# Patient Record
Sex: FEMALE | Race: WHITE | NOT HISPANIC OR LATINO | Employment: OTHER | URBAN - METROPOLITAN AREA
[De-identification: names, ages, dates, MRNs, and addresses within clinical notes are randomized per-mention and may not be internally consistent; named-entity substitution may affect disease eponyms.]

---

## 2022-08-06 ENCOUNTER — TRANSFERRED RECORDS (OUTPATIENT)
Dept: HEALTH INFORMATION MANAGEMENT | Facility: CLINIC | Age: 87
End: 2022-08-06

## 2022-08-06 LAB
ALT SERPL-CCNC: 18 U/L (ref 12–78)
AST SERPL-CCNC: 11 U/L (ref 15–37)
CREATININE (EXTERNAL): 1 MG/DL (ref 0.6–1)
GFR ESTIMATED (EXTERNAL): 53 ML/MIN
GLUCOSE (EXTERNAL): 93 MG/DL (ref 74–106)
POTASSIUM (EXTERNAL): 4.1 MMOL/L (ref 3.6–5.2)

## 2022-08-25 ENCOUNTER — TRANSFERRED RECORDS (OUTPATIENT)
Dept: HEALTH INFORMATION MANAGEMENT | Facility: CLINIC | Age: 87
End: 2022-08-25

## 2022-08-25 LAB
ALT SERPL-CCNC: 19 U/L (ref 12–78)
AST SERPL-CCNC: 13 U/L (ref 15–37)
CREATININE (EXTERNAL): 1 MG/DL (ref 0.6–1)
GFR ESTIMATED (EXTERNAL): 53 ML/MIN
GLUCOSE (EXTERNAL): 91 MG/DL (ref 74–106)
POTASSIUM (EXTERNAL): 4.1 MMOL/L (ref 3.6–5.2)

## 2022-08-26 ENCOUNTER — TRANSFERRED RECORDS (OUTPATIENT)
Dept: HEALTH INFORMATION MANAGEMENT | Facility: CLINIC | Age: 87
End: 2022-08-26

## 2022-09-19 ENCOUNTER — TRANSFERRED RECORDS (OUTPATIENT)
Dept: HEALTH INFORMATION MANAGEMENT | Facility: CLINIC | Age: 87
End: 2022-09-19

## 2022-09-19 LAB
ALT SERPL-CCNC: 14 U/L (ref 12–78)
AST SERPL-CCNC: 15 U/L (ref 15–37)
CREATININE (EXTERNAL): 1 MG/DL (ref 0.6–1)
GFR ESTIMATED (EXTERNAL): 53 ML/MIN
GLUCOSE (EXTERNAL): 111 MG/DL (ref 74–106)
POTASSIUM (EXTERNAL): 3.8 MMOL/L (ref 3.6–5.2)

## 2022-10-07 ENCOUNTER — TRANSFERRED RECORDS (OUTPATIENT)
Dept: HEALTH INFORMATION MANAGEMENT | Facility: CLINIC | Age: 87
End: 2022-10-07

## 2022-10-19 ENCOUNTER — TRANSFERRED RECORDS (OUTPATIENT)
Dept: HEALTH INFORMATION MANAGEMENT | Facility: CLINIC | Age: 87
End: 2022-10-19

## 2022-11-06 ENCOUNTER — TRANSFERRED RECORDS (OUTPATIENT)
Dept: HEALTH INFORMATION MANAGEMENT | Facility: CLINIC | Age: 87
End: 2022-11-06

## 2022-11-06 LAB
CREATININE (EXTERNAL): 1 MG/DL (ref 0.6–1)
GFR ESTIMATED (EXTERNAL): 53 ML/MIN
GLUCOSE (EXTERNAL): 102 MG/DL (ref 74–106)
POTASSIUM (EXTERNAL): 4.1 MMOL/L (ref 3.6–5.2)

## 2023-01-19 ENCOUNTER — OFFICE VISIT (OUTPATIENT)
Dept: FAMILY MEDICINE | Facility: CLINIC | Age: 88
End: 2023-01-19
Payer: MEDICARE

## 2023-01-19 VITALS
RESPIRATION RATE: 16 BRPM | DIASTOLIC BLOOD PRESSURE: 77 MMHG | SYSTOLIC BLOOD PRESSURE: 146 MMHG | WEIGHT: 106 LBS | HEART RATE: 91 BPM | OXYGEN SATURATION: 97 % | TEMPERATURE: 97.8 F

## 2023-01-19 DIAGNOSIS — F32.1 CURRENT MODERATE EPISODE OF MAJOR DEPRESSIVE DISORDER WITHOUT PRIOR EPISODE (H): Primary | ICD-10-CM

## 2023-01-19 DIAGNOSIS — R41.3 MEMORY LOSS: ICD-10-CM

## 2023-01-19 DIAGNOSIS — E21.3 HYPERPARATHYROIDISM (H): ICD-10-CM

## 2023-01-19 DIAGNOSIS — H35.3233 EXUDATIVE AGE-RELATED MACULAR DEGENERATION OF BOTH EYES WITH INACTIVE SCAR (H): ICD-10-CM

## 2023-01-19 PROCEDURE — 99204 OFFICE O/P NEW MOD 45 MIN: CPT | Performed by: FAMILY MEDICINE

## 2023-01-19 NOTE — PROGRESS NOTES
Assessment & Plan     Current moderate episode of major depressive disorder without prior episode (H)  Here with daughter today.  Living with daughter for winter and potentially longer.      Have been concerns about mood and memory.      Keysha voices being proud for how well she is doing for age 89, has been very independent, [place]/week.     Does endorse has had somewhat more difficulty with short term memory but does not see as problem.     With further discussion although sees herself as strong woman does admit feels quite lonely, sad at times and tends to isolate/stay in room when this happens.      Daughter suggests that perhaps depression medication could be helpful.     We had careful discussion about the risks and benefits of this and considering very low risks, does not change personality and not addictive I feel strongly would be of benefit to try.  Keysha agrees with this logic and willing to try.  Will start low dose and go up slowly to 1/2 max dose.  Watch for upset stomach/jitteriness as main side effects.      Follow up recommended about six weeks.     - sertraline (ZOLOFT) 25 MG tablet  Dispense: 30 tablet; Refill: 0  - sertraline (ZOLOFT) 50 MG tablet  Dispense: 90 tablet; Refill: 3    Memory loss  Will continue to monitor and discuss. Per chart review has had appropriate evaluation for underlying causes with metabolic testing, thyroid, b12/folate, mri.     Currently has safety needs met living with daughter.     Reviewed additional health history of macular degeneration and possibly hyperparathyroidism with high pth and only slightly high calcium levsl.     Recent gfr 54 so may have ckd                 Return in about 6 weeks (around 3/2/2023) for mood/depression.    Joel Daniel Wegener, MD  [facility]    Subjective   Keysha is a 89 year old, presenting for the following health issues:  Establish Care      History of  Present Illness       Reason for visit:  Check up    She eats 2-3 servings of fruits and vegetables daily.She exercises with enough effort to increase her heart rate 30 to 60 minutes per day.    She is taking medications regularly.             Review of Systems         Objective    There were no vitals taken for this visit.  There is no height or weight on file to calculate BMI.  Physical Exam

## 2023-01-19 NOTE — PATIENT INSTRUCTIONS
Disucssed health history, memory, mood.  Living with daughter over winter.     Discussed trial of fluoxetine (I will double check to make sure this is preferred geriatric medication).     Will start low dose for 2-4 weeks then increase if no side effects.     Follow up about six weeks.

## 2023-01-20 ENCOUNTER — TELEPHONE (OUTPATIENT)
Dept: FAMILY MEDICINE | Facility: CLINIC | Age: 88
End: 2023-01-20
Payer: MEDICARE

## 2023-01-20 RX ORDER — SERTRALINE HYDROCHLORIDE 25 MG/1
TABLET, FILM COATED ORAL
Qty: 30 TABLET | Refills: 0 | Status: SHIPPED | OUTPATIENT
Start: 2023-01-20 | End: 2023-03-17

## 2023-01-20 NOTE — TELEPHONE ENCOUNTER
Triage,   Patient's daughter called.   Saw [facility] yesterday.  States they are waiting for a Rx from [facility].   Currently at the pharmacy.   Please advise.   Thanks!  Cassidy SHEPHERD

## 2023-01-20 NOTE — TELEPHONE ENCOUNTER
Thank you.  I was waiting to discuss with Pharm D team which I have now.     Actually sertraline would be preferred selective serotonin reuptake inhibitor in elderly.     Please let them know I sent prescription now.     Joel Wegener,MD w

## 2023-01-22 PROBLEM — H35.3233: Status: ACTIVE | Noted: 2023-01-22

## 2023-01-22 PROBLEM — E21.3 HYPERPARATHYROIDISM (H): Status: ACTIVE | Noted: 2023-01-22

## 2023-01-31 DIAGNOSIS — F32.1 CURRENT MODERATE EPISODE OF MAJOR DEPRESSIVE DISORDER WITHOUT PRIOR EPISODE (H): ICD-10-CM

## 2023-02-01 RX ORDER — SERTRALINE HYDROCHLORIDE 25 MG/1
TABLET, FILM COATED ORAL
Qty: 1 TABLET | Refills: 0 | OUTPATIENT
Start: 2023-02-01

## 2023-02-23 ENCOUNTER — NURSE TRIAGE (OUTPATIENT)
Dept: NURSING | Facility: CLINIC | Age: 88
End: 2023-02-23
Payer: MEDICARE

## 2023-02-23 DIAGNOSIS — U07.1 INFECTION DUE TO 2019 NOVEL CORONAVIRUS: Primary | ICD-10-CM

## 2023-02-23 NOTE — TELEPHONE ENCOUNTER
COVID Positive/Requesting COVID treatment    Patient is positive for COVID and requesting treatment options.    Date of positive COVID test (PCR or at home)? 2/22/2023  Current COVID symptoms: fever or chills, cough, fatigue, muscle or body aches and congestion or runny nose  Date COVID symptoms began: 2/20/2023    Message should be routed to clinic RN pool. Best phone number to use for call back: [phone]    ALEXANDRIA MONTOYA RN        Reason for Disposition    [1] HIGH RISK for severe COVID complications (e.g., weak immune system, age > 64 years, obesity with BMI of 30 or higher, pregnant, chronic lung disease or other chronic medical condition) AND [2] COVID symptoms (e.g., cough, fever)  (Exceptions: Already seen by PCP and no new or worsening symptoms.)    Additional Information    Negative: SEVERE difficulty breathing (e.g., struggling for each breath, speaks in single words)    Negative: Difficult to awaken or acting confused (e.g., disoriented, slurred speech)    Negative: Bluish (or gray) lips or face now    Negative: Shock suspected (e.g., cold/pale/clammy skin, too weak to stand, low BP, rapid pulse)    Negative: Sounds like a life-threatening emergency to the triager    Negative: [1] Diagnosed or suspected COVID-19 AND [2] symptoms lasting 3 or more weeks    Negative: [1] COVID-19 exposure AND [2] no symptoms    Negative: COVID-19 vaccine reaction suspected (e.g., fever, headache, muscle aches) occurring 1 to 3 days after getting vaccine    Negative: COVID-19 vaccine, questions about    Negative: [1] Lives with someone known to have influenza (flu test positive) AND [2] flu-like symptoms (e.g., cough, runny nose, sore throat, SOB; with or without fever)    Negative: [1] Adult with possible COVID-19 symptoms AND [2] triager concerned about severity of symptoms or other causes    Negative: COVID-19 and breastfeeding, questions about    Negative: SEVERE or constant chest pain or pressure   (Exception: Mild central chest pain, present only when coughing.)    Negative: MODERATE difficulty breathing (e.g., speaks in phrases, SOB even at rest, pulse 100-120)    Negative: Headache and stiff neck (can't touch chin to chest)    Negative: Oxygen level (e.g., pulse oximetry) 90 percent or lower    Negative: Chest pain or pressure  (Exception: MILD central chest pain, present only when coughing)    Negative: Patient sounds very sick or weak to the triager    Negative: MILD difficulty breathing (e.g., minimal/no SOB at rest, SOB with walking, pulse <100)    Negative: Fever > 103 F (39.4 C)    Negative: [1] Fever > 101 F (38.3 C) AND [2] over 60 years of age    Negative: [1] Fever > 100.0 F (37.8 C) AND [2] bedridden (e.g., nursing home patient, CVA, chronic illness, recovering from surgery)    Protocols used: CORONAVIRUS (COVID-19) DIAGNOSED OR [id number]

## 2023-02-23 NOTE — TELEPHONE ENCOUNTER
RN/TC please let her AND her daughter know the following (consent to communicate on file.)     Provider Response to 2nd Level Triage Request    I have reviewed the RN documentation. My recommendation is:  meets critera for protocol based paxlovid treatment.  No significant drug interactions so may continue sertraline.  does havereduced kidney function so dose adjusted.    I sent prescription to [facility].     Please note I believe her daughter who cares for her has also been prescribed paxlovid.  Please make sure they know that the dosing of their respective medications will be different.     Joel Wegener,MD

## 2023-02-23 NOTE — TELEPHONE ENCOUNTER
Left non detailed voicemail for patient and daughter  Stated dose is different and that pharmacist should also review this    Jazz PADILLA RN

## 2023-03-17 ENCOUNTER — OFFICE VISIT (OUTPATIENT)
Dept: FAMILY MEDICINE | Facility: CLINIC | Age: 88
End: 2023-03-17
Payer: MEDICARE

## 2023-03-17 VITALS
RESPIRATION RATE: 14 BRPM | HEIGHT: 58 IN | BODY MASS INDEX: 21.14 KG/M2 | HEART RATE: 90 BPM | WEIGHT: 100.7 LBS | TEMPERATURE: 97.8 F | OXYGEN SATURATION: 99 % | SYSTOLIC BLOOD PRESSURE: 120 MMHG | DIASTOLIC BLOOD PRESSURE: 69 MMHG

## 2023-03-17 DIAGNOSIS — M48.061 SPINAL STENOSIS OF LUMBAR REGION WITHOUT NEUROGENIC CLAUDICATION: ICD-10-CM

## 2023-03-17 DIAGNOSIS — M47.816 SPONDYLOSIS OF LUMBAR REGION WITHOUT MYELOPATHY OR RADICULOPATHY: ICD-10-CM

## 2023-03-17 DIAGNOSIS — R41.3 MEMORY LOSS: ICD-10-CM

## 2023-03-17 DIAGNOSIS — F32.1 CURRENT MODERATE EPISODE OF MAJOR DEPRESSIVE DISORDER WITHOUT PRIOR EPISODE (H): Primary | ICD-10-CM

## 2023-03-17 PROCEDURE — 99213 OFFICE O/P EST LOW 20 MIN: CPT | Performed by: FAMILY MEDICINE

## 2023-03-17 ASSESSMENT — PAIN SCALES - GENERAL: PAINLEVEL: MODERATE PAIN (4)

## 2023-03-17 NOTE — PROGRESS NOTES
Assessment & Plan     Current moderate episode of major depressive disorder without prior episode (H)  Here with daughter whom she is living with.  Keysha notes good mood, no side effects from sertraline.  Reports feeling happy, good attitude/getting along with daughter.  Still playing bridge 1-2 times/week and participating in a book club with daughter. Daughter Meka feel mood much improved since last visit.  Not isolating in room nearly so much, has only cried once , seems less worried.     Discussed full dose 100mg/day of sertraline however she is not particularly heavy so elected to stay at current 50mg/day dose.     - Adult Mental Health  Referral; Future    Memory loss  Still some concern re: memory despite improvement above.  Keysha is hoping to return to her home living independently this summer however she does report agreement and understanding of memory concerns and we discussed referral for neurocognitive testing for independent living safety per request of her daughter and she agrees to participate.   - Adult Mental Health  Referral; Future    Spinal stenosis of lumbar region without neurogenic claudication  and    Spondylosis of lumbar region without myelopathy or radiculopathy  Both noted as chronic problems.  Keysha not feeling substantial change or need to intervene  She is proud of how active she has stayed despite h/o back arthritis/spinal stenosis.  Using apap only now with some relief.  Previously was using hydrocodone but did not seem to be beneficial only caused drowsiness.  Does not use nsaids due to low gfr.     Follow up January for next annual wellness/earlier as needed.         25 minutes spent on the date of the encounter doing chart review, history and exam, negotiating and explaining the plan with the patient, documentation and further activities as noted above.                    No follow-ups on file.    Joel Daniel Wegener, MD  [facility]  YANI Chopra is a 89 year old, presenting for the following health issues:  No chief complaint on file.      History of Present Illness       Back Pain:  She presents for follow up of back pain. Patient's back pain is a chronic problem.  Location of back pain:  Right lower back  Description of back pain: dull ache  Back pain spreads: right buttocks    Since patient first noticed back pain, pain is: always present, but gets better and worse  Does back pain interfere with her job:  Not applicable      She eats 2-3 servings of fruits and vegetables daily.She exercises with enough effort to increase her heart rate 30 to 60 minutes per day.               Review of Systems         Objective    There were no vitals taken for this visit.  There is no height or weight on file to calculate BMI.  Physical Exam

## 2023-06-13 DIAGNOSIS — F32.1 CURRENT MODERATE EPISODE OF MAJOR DEPRESSIVE DISORDER WITHOUT PRIOR EPISODE (H): ICD-10-CM

## 2023-06-13 NOTE — TELEPHONE ENCOUNTER
Prescription approved per [facility] Refill Protocol.      Aayz DEVINE RN   [facility]

## 2023-12-27 DIAGNOSIS — F32.1 CURRENT MODERATE EPISODE OF MAJOR DEPRESSIVE DISORDER WITHOUT PRIOR EPISODE (H): ICD-10-CM

## 2024-01-08 ENCOUNTER — HOSPITAL ENCOUNTER (OUTPATIENT)
Facility: CLINIC | Age: 89
Setting detail: OBSERVATION
Discharge: HOME | End: 2024-01-09
Attending: EMERGENCY MEDICINE | Admitting: STUDENT IN AN ORGANIZED HEALTH CARE EDUCATION/TRAINING PROGRAM
Payer: MEDICARE

## 2024-01-08 ENCOUNTER — APPOINTMENT (OUTPATIENT)
Dept: MRI IMAGING | Facility: CLINIC | Age: 89
End: 2024-01-08
Attending: EMERGENCY MEDICINE
Payer: MEDICARE

## 2024-01-08 ENCOUNTER — TELEPHONE (OUTPATIENT)
Dept: FAMILY MEDICINE | Facility: CLINIC | Age: 89
End: 2024-01-08
Payer: MEDICARE

## 2024-01-08 DIAGNOSIS — M48.061 SPINAL STENOSIS OF LUMBAR REGION WITHOUT NEUROGENIC CLAUDICATION: ICD-10-CM

## 2024-01-08 DIAGNOSIS — R11.10 VOMITING AND DIARRHEA: ICD-10-CM

## 2024-01-08 DIAGNOSIS — R53.1 GENERALIZED WEAKNESS: ICD-10-CM

## 2024-01-08 DIAGNOSIS — R20.0 NUMBNESS AND TINGLING IN LEFT ARM: ICD-10-CM

## 2024-01-08 DIAGNOSIS — R20.2 NUMBNESS AND TINGLING IN LEFT ARM: ICD-10-CM

## 2024-01-08 DIAGNOSIS — R19.7 VOMITING AND DIARRHEA: ICD-10-CM

## 2024-01-08 DIAGNOSIS — R79.89 ELEVATED TROPONIN: ICD-10-CM

## 2024-01-08 DIAGNOSIS — R41.3 MEMORY LOSS: Primary | ICD-10-CM

## 2024-01-08 LAB
ALBUMIN SERPL BCG-MCNC: 4.2 G/DL (ref 3.5–5.2)
ALBUMIN UR-MCNC: NEGATIVE MG/DL
ALP SERPL-CCNC: 74 U/L (ref 40–150)
ALT SERPL W P-5'-P-CCNC: 10 U/L (ref 0–50)
ANION GAP SERPL CALCULATED.3IONS-SCNC: 8 MMOL/L (ref 7–15)
APPEARANCE UR: CLEAR
AST SERPL W P-5'-P-CCNC: 15 U/L (ref 0–45)
BASOPHILS # BLD AUTO: 0 10E3/UL (ref 0–0.2)
BASOPHILS NFR BLD AUTO: 0 %
BILIRUB SERPL-MCNC: 0.2 MG/DL
BILIRUB UR QL STRIP: NEGATIVE
BUN SERPL-MCNC: 21.7 MG/DL (ref 8–23)
CALCIUM SERPL-MCNC: 9.3 MG/DL (ref 8.2–9.6)
CHLORIDE SERPL-SCNC: 104 MMOL/L (ref 98–107)
COLOR UR AUTO: ABNORMAL
CREAT SERPL-MCNC: 0.94 MG/DL (ref 0.51–0.95)
DEPRECATED HCO3 PLAS-SCNC: 24 MMOL/L (ref 22–29)
EGFRCR SERPLBLD CKD-EPI 2021: 56 ML/MIN/1.73M2
EOSINOPHIL # BLD AUTO: 0 10E3/UL (ref 0–0.7)
EOSINOPHIL NFR BLD AUTO: 1 %
ERYTHROCYTE [DISTWIDTH] IN BLOOD BY AUTOMATED COUNT: 15.3 % (ref 10–15)
FLUAV RNA SPEC QL NAA+PROBE: NEGATIVE
FLUBV RNA RESP QL NAA+PROBE: NEGATIVE
GLUCOSE SERPL-MCNC: 125 MG/DL (ref 70–99)
GLUCOSE UR STRIP-MCNC: NEGATIVE MG/DL
HCT VFR BLD AUTO: 30.8 % (ref 35–47)
HGB BLD-MCNC: 9.6 G/DL (ref 11.7–15.7)
HGB UR QL STRIP: NEGATIVE
HOLD SPECIMEN: NORMAL
HOLD SPECIMEN: NORMAL
IMM GRANULOCYTES # BLD: 0 10E3/UL
IMM GRANULOCYTES NFR BLD: 0 %
IRON BINDING CAPACITY (ROCHE): 362 UG/DL (ref 240–430)
IRON SATN MFR SERPL: 9 % (ref 15–46)
IRON SERPL-MCNC: 31 UG/DL (ref 37–145)
KETONES UR STRIP-MCNC: NEGATIVE MG/DL
LACTATE SERPL-SCNC: 1.4 MMOL/L (ref 0.7–2)
LEUKOCYTE ESTERASE UR QL STRIP: ABNORMAL
LIPASE SERPL-CCNC: 39 U/L (ref 13–60)
LYMPHOCYTES # BLD AUTO: 0.5 10E3/UL (ref 0.8–5.3)
LYMPHOCYTES NFR BLD AUTO: 10 %
MCH RBC QN AUTO: 25.8 PG (ref 26.5–33)
MCHC RBC AUTO-ENTMCNC: 31.2 G/DL (ref 31.5–36.5)
MCV RBC AUTO: 83 FL (ref 78–100)
MONOCYTES # BLD AUTO: 0.6 10E3/UL (ref 0–1.3)
MONOCYTES NFR BLD AUTO: 11 %
MUCOUS THREADS #/AREA URNS LPF: PRESENT /LPF
NEUTROPHILS # BLD AUTO: 4.2 10E3/UL (ref 1.6–8.3)
NEUTROPHILS NFR BLD AUTO: 78 %
NITRATE UR QL: NEGATIVE
NRBC # BLD AUTO: 0 10E3/UL
NRBC BLD AUTO-RTO: 0 /100
PH UR STRIP: 6 [PH] (ref 5–7)
PLATELET # BLD AUTO: 210 10E3/UL (ref 150–450)
POTASSIUM SERPL-SCNC: 4.6 MMOL/L (ref 3.4–5.3)
PROT SERPL-MCNC: 6.6 G/DL (ref 6.4–8.3)
RBC # BLD AUTO: 3.72 10E6/UL (ref 3.8–5.2)
RBC URINE: <1 /HPF
RETICS # AUTO: 0.04 10E6/UL (ref 0.03–0.1)
RETICS/RBC NFR AUTO: 1.2 % (ref 0.5–2)
RSV RNA SPEC NAA+PROBE: NEGATIVE
SARS-COV-2 RNA RESP QL NAA+PROBE: NEGATIVE
SODIUM SERPL-SCNC: 136 MMOL/L (ref 135–145)
SP GR UR STRIP: 1.01 (ref 1–1.03)
SQUAMOUS EPITHELIAL: <1 /HPF
TROPONIN T SERPL HS-MCNC: 30 NG/L
TROPONIN T SERPL HS-MCNC: 33 NG/L
UROBILINOGEN UR STRIP-MCNC: NORMAL MG/DL
WBC # BLD AUTO: 5.4 10E3/UL (ref 4–11)
WBC URINE: 10 /HPF

## 2024-01-08 PROCEDURE — 70544 MR ANGIOGRAPHY HEAD W/O DYE: CPT | Mod: MA

## 2024-01-08 PROCEDURE — 87637 SARSCOV2&INF A&B&RSV AMP PRB: CPT | Performed by: EMERGENCY MEDICINE

## 2024-01-08 PROCEDURE — 258N000003 HC RX IP 258 OP 636: Performed by: EMERGENCY MEDICINE

## 2024-01-08 PROCEDURE — 36415 COLL VENOUS BLD VENIPUNCTURE: CPT | Performed by: STUDENT IN AN ORGANIZED HEALTH CARE EDUCATION/TRAINING PROGRAM

## 2024-01-08 PROCEDURE — 85025 COMPLETE CBC W/AUTO DIFF WBC: CPT | Performed by: EMERGENCY MEDICINE

## 2024-01-08 PROCEDURE — 258N000003 HC RX IP 258 OP 636: Performed by: STUDENT IN AN ORGANIZED HEALTH CARE EDUCATION/TRAINING PROGRAM

## 2024-01-08 PROCEDURE — G0378 HOSPITAL OBSERVATION PER HR: HCPCS

## 2024-01-08 PROCEDURE — 96361 HYDRATE IV INFUSION ADD-ON: CPT

## 2024-01-08 PROCEDURE — 82728 ASSAY OF FERRITIN: CPT | Performed by: STUDENT IN AN ORGANIZED HEALTH CARE EDUCATION/TRAINING PROGRAM

## 2024-01-08 PROCEDURE — 85045 AUTOMATED RETICULOCYTE COUNT: CPT | Performed by: STUDENT IN AN ORGANIZED HEALTH CARE EDUCATION/TRAINING PROGRAM

## 2024-01-08 PROCEDURE — A9585 GADOBUTROL INJECTION: HCPCS | Performed by: EMERGENCY MEDICINE

## 2024-01-08 PROCEDURE — 83690 ASSAY OF LIPASE: CPT | Performed by: EMERGENCY MEDICINE

## 2024-01-08 PROCEDURE — 36415 COLL VENOUS BLD VENIPUNCTURE: CPT | Performed by: EMERGENCY MEDICINE

## 2024-01-08 PROCEDURE — 70549 MR ANGIOGRAPH NECK W/O&W/DYE: CPT | Mod: MA

## 2024-01-08 PROCEDURE — 70553 MRI BRAIN STEM W/O & W/DYE: CPT | Mod: MA

## 2024-01-08 PROCEDURE — 87086 URINE CULTURE/COLONY COUNT: CPT | Performed by: EMERGENCY MEDICINE

## 2024-01-08 PROCEDURE — 99223 1ST HOSP IP/OBS HIGH 75: CPT | Mod: AI | Performed by: STUDENT IN AN ORGANIZED HEALTH CARE EDUCATION/TRAINING PROGRAM

## 2024-01-08 PROCEDURE — 84484 ASSAY OF TROPONIN QUANT: CPT | Performed by: STUDENT IN AN ORGANIZED HEALTH CARE EDUCATION/TRAINING PROGRAM

## 2024-01-08 PROCEDURE — 81001 URINALYSIS AUTO W/SCOPE: CPT | Performed by: EMERGENCY MEDICINE

## 2024-01-08 PROCEDURE — 99285 EMERGENCY DEPT VISIT HI MDM: CPT | Mod: 25

## 2024-01-08 PROCEDURE — 84484 ASSAY OF TROPONIN QUANT: CPT | Performed by: EMERGENCY MEDICINE

## 2024-01-08 PROCEDURE — 82040 ASSAY OF SERUM ALBUMIN: CPT | Performed by: EMERGENCY MEDICINE

## 2024-01-08 PROCEDURE — 255N000002 HC RX 255 OP 636: Performed by: EMERGENCY MEDICINE

## 2024-01-08 PROCEDURE — 83605 ASSAY OF LACTIC ACID: CPT | Performed by: EMERGENCY MEDICINE

## 2024-01-08 PROCEDURE — 83550 IRON BINDING TEST: CPT | Performed by: STUDENT IN AN ORGANIZED HEALTH CARE EDUCATION/TRAINING PROGRAM

## 2024-01-08 PROCEDURE — 250N000013 HC RX MED GY IP 250 OP 250 PS 637: Performed by: STUDENT IN AN ORGANIZED HEALTH CARE EDUCATION/TRAINING PROGRAM

## 2024-01-08 PROCEDURE — 93005 ELECTROCARDIOGRAM TRACING: CPT

## 2024-01-08 PROCEDURE — 250N000013 HC RX MED GY IP 250 OP 250 PS 637: Performed by: EMERGENCY MEDICINE

## 2024-01-08 RX ORDER — ONDANSETRON 2 MG/ML
4 INJECTION INTRAMUSCULAR; INTRAVENOUS EVERY 6 HOURS PRN
Status: DISCONTINUED | OUTPATIENT
Start: 2024-01-08 | End: 2024-01-09 | Stop reason: HOSPADM

## 2024-01-08 RX ORDER — GADOBUTROL 604.72 MG/ML
10 INJECTION INTRAVENOUS ONCE
Status: COMPLETED | OUTPATIENT
Start: 2024-01-08 | End: 2024-01-08

## 2024-01-08 RX ORDER — MAGNESIUM HYDROXIDE/ALUMINUM HYDROXICE/SIMETHICONE 120; 1200; 1200 MG/30ML; MG/30ML; MG/30ML
15 SUSPENSION ORAL 3 TIMES DAILY PRN
Status: DISCONTINUED | OUTPATIENT
Start: 2024-01-08 | End: 2024-01-09 | Stop reason: HOSPADM

## 2024-01-08 RX ORDER — SODIUM CHLORIDE 9 MG/ML
INJECTION, SOLUTION INTRAVENOUS CONTINUOUS
Status: DISCONTINUED | OUTPATIENT
Start: 2024-01-08 | End: 2024-01-09

## 2024-01-08 RX ORDER — ACETAMINOPHEN 650 MG/1
650 SUPPOSITORY RECTAL EVERY 4 HOURS PRN
Status: DISCONTINUED | OUTPATIENT
Start: 2024-01-08 | End: 2024-01-09 | Stop reason: HOSPADM

## 2024-01-08 RX ORDER — ASPIRIN 81 MG/1
324 TABLET, CHEWABLE ORAL ONCE
Status: COMPLETED | OUTPATIENT
Start: 2024-01-08 | End: 2024-01-08

## 2024-01-08 RX ORDER — AMOXICILLIN 250 MG
1 CAPSULE ORAL 2 TIMES DAILY PRN
Status: DISCONTINUED | OUTPATIENT
Start: 2024-01-08 | End: 2024-01-09 | Stop reason: HOSPADM

## 2024-01-08 RX ORDER — AMOXICILLIN 250 MG
2 CAPSULE ORAL 2 TIMES DAILY PRN
Status: DISCONTINUED | OUTPATIENT
Start: 2024-01-08 | End: 2024-01-09 | Stop reason: HOSPADM

## 2024-01-08 RX ORDER — ONDANSETRON 4 MG/1
4 TABLET, ORALLY DISINTEGRATING ORAL EVERY 6 HOURS PRN
Status: DISCONTINUED | OUTPATIENT
Start: 2024-01-08 | End: 2024-01-09 | Stop reason: HOSPADM

## 2024-01-08 RX ORDER — ACETAMINOPHEN 325 MG/1
650 TABLET ORAL EVERY 4 HOURS PRN
Status: DISCONTINUED | OUTPATIENT
Start: 2024-01-08 | End: 2024-01-09 | Stop reason: HOSPADM

## 2024-01-08 RX ORDER — PANTOPRAZOLE SODIUM 40 MG/1
40 TABLET, DELAYED RELEASE ORAL
Status: DISCONTINUED | OUTPATIENT
Start: 2024-01-09 | End: 2024-01-09 | Stop reason: HOSPADM

## 2024-01-08 RX ORDER — ANTIOX #8/OM3/DHA/EPA/LUT/ZEAX 250-2.5 MG
2 CAPSULE ORAL DAILY
COMMUNITY
End: 2024-03-04

## 2024-01-08 RX ADMIN — SODIUM CHLORIDE: 9 INJECTION, SOLUTION INTRAVENOUS at 22:42

## 2024-01-08 RX ADMIN — SODIUM CHLORIDE 500 ML: 9 INJECTION, SOLUTION INTRAVENOUS at 16:29

## 2024-01-08 RX ADMIN — GADOBUTROL 10 ML: 604.72 INJECTION INTRAVENOUS at 17:06

## 2024-01-08 RX ADMIN — ASPIRIN 81 MG CHEWABLE TABLET 324 MG: 81 TABLET CHEWABLE at 21:01

## 2024-01-08 RX ADMIN — ACETAMINOPHEN 650 MG: 325 TABLET, FILM COATED ORAL at 22:50

## 2024-01-08 ASSESSMENT — ACTIVITIES OF DAILY LIVING (ADL)
ADLS_ACUITY_SCORE: 35
ADLS_ACUITY_SCORE: 35
ADLS_ACUITY_SCORE: 33
ADLS_ACUITY_SCORE: 35
ADLS_ACUITY_SCORE: 35

## 2024-01-08 NOTE — TELEPHONE ENCOUNTER
"JW,        Patient's daughter Meka called.    State patient has not been herself since yesterday.  States she has had stomach upset - vomited last night around 9:00 pm.  Loose stools.    Patient did eat a good breakfast this am - no vomiting    Patient informed daughter he left hand and arm feel numb.  Patient can move both her arm and hand.  Denies any facial drooping.    States usually up walking but is sitting in chair and when she gets up she is very wobbly.  Does not use a walker/cane    Denies any fever, recent illness     \"I don't know what it is but she is just not herself\"    Send her to ER?     Hellen Kennedy RN    "

## 2024-01-08 NOTE — ED PROVIDER NOTES
History     Chief Complaint:  Generalized Weakness and Vomiting     The history is provided by the patient and a relative.      Keysha Anders is a 89 year old female with history of stroke, recent dementia diagnosis, osteoporosis, iron deficiency anemia, gastritis, and encephalitis (in 1960) who presents to the ED with her daughter, whom she currently lives with and is her caregiver, for evaluation of generalized weakness, vomiting, and diarrhea. Keysha reports she developed vomiting last night and has had 2-3 episodes of diarrhea daily for the last 2 days. She had mild upper abdominal discomfort with this initially, which has since resolved completely. She also endorses numbness/tingling to her right hand extending to her wrist over the last few days. Her daughter notes the patient was intentional with her gait this morning, using walls and furniture to assist her, which is not her baseline. The patient denies weakness, black or bloody stools, fevers, runny nose, sore throat, cough, headache, chest pain, or shortness of breath. She denies lower extremity or right upper extremity symptoms such as numbness, tingling, or weakness. No blood thinners use.     Independent Historian:   Daughter - They report as above.    Review of External Notes:   I reviewed her family medicine visit note from 7/12/23 for sore throat and cough.    Medications:    Sertraline    Past Medical History:    Stroke  Ataxia  Heart murmur  Macular degeneration  Pseudophakia  Cataract  Chronic right-sided low back pain  SNHL  Gait disturbance  Spondylosis of lumbar spine  Central stenosis of spinal canal  Environmental allergies  OWEN  MDD  Hyperparathyroidism  Vitamin D deficiency  Multiple thyroid nodules  Osteoporosis  Iron deficiency anemia  RLS  Bronchopulmonary dysplasia  Recurrent pneumonia  Encephalitis  NSAID induced gastritis  Renovascular hypertension    Past Surgical History:    Hysterectomy  Radiofrequency left lower lumbar facet  joint denervation    Physical Exam   Patient Vitals for the past 24 hrs:   BP Temp Temp src Pulse Resp SpO2   01/08/24 2235 (!) 172/77 98.1  F (36.7  C) Oral -- 18 96 %   01/08/24 1432 127/72 97.6  F (36.4  C) Oral 95 20 99 %      Physical Exam    General:              Well-nourished              Speaking in full sentences  Eyes:              Conjunctiva without injection or scleral icterus  ENT:              Dry mucous membranes              Nares patent              Pinnae normal  Neck:              Full ROM              No stiffness appreciated  Resp:              Lungs CTAB              No crackles, wheezing or audible rubs              Good air movement  CV:                    Normal rate, regular rhythm              S1 and S2 present              No murmur, gallop or rub  GI:              BS present              Abdomen soft without distention              Non-tender to light and deep palpation              No palpable masses              No guarding or rebound tenderness  Skin:              Warm, dry, well perfused              No rashes or open wounds on exposed skin  MSK:              Moves all extremities              No focal deformities or swelling  Neuro:              Alert              CN III-XII intact              5/5  strength, no arm drift              No objective sensory discrepancy of upper or lower extremities              4/5 left ankle dorsiflexion, 5/5 plantarflexion bilaterally              Answers questions appropriately              Moves all extremities equally              Able to ambulate  Psych:              Normal affect, normal mood    Emergency Department Course   ECG  ECG taken at 1453, ECG read at 1455  Normal sinus rhythm  Normal ECG  Rate 95 bpm. VT interval 202 ms. QRS duration 72 ms. QT/QTc 320/402 ms. P-R-T axes 55 25 47.      Imaging:  MR Brain w/o & w Contrast   Final Result   IMPRESSION:   HEAD MRI:    1.  No acute intracranial process.   2.  Generalized brain atrophy  and presumed microvascular ischemic changes as detailed above.      HEAD MRA:    1.  No large vessel occlusion or hemodynamically significant stenosis.      NECK MRA:   1.  No large vessel occlusion or hemodynamically significant stenosis.   2.  The nondominant left vertebral artery is small and suboptimally assessed.      MRA Angiogram Head w/o Contrast   Final Result   IMPRESSION:   HEAD MRI:    1.  No acute intracranial process.   2.  Generalized brain atrophy and presumed microvascular ischemic changes as detailed above.      HEAD MRA:    1.  No large vessel occlusion or hemodynamically significant stenosis.      NECK MRA:   1.  No large vessel occlusion or hemodynamically significant stenosis.   2.  The nondominant left vertebral artery is small and suboptimally assessed.      MRA Angiogram Neck w/o & w Contrast   Final Result   IMPRESSION:   HEAD MRI:    1.  No acute intracranial process.   2.  Generalized brain atrophy and presumed microvascular ischemic changes as detailed above.      HEAD MRA:    1.  No large vessel occlusion or hemodynamically significant stenosis.      NECK MRA:   1.  No large vessel occlusion or hemodynamically significant stenosis.   2.  The nondominant left vertebral artery is small and suboptimally assessed.         Report per radiology    Laboratory:  Labs Ordered and Resulted from Time of ED Arrival to Time of ED Departure   ROUTINE UA WITH MICROSCOPIC REFLEX TO CULTURE - Abnormal       Result Value    Color Urine Light Yellow      Appearance Urine Clear      Glucose Urine Negative      Bilirubin Urine Negative      Ketones Urine Negative      Specific Gravity Urine 1.012      Blood Urine Negative      pH Urine 6.0      Protein Albumin Urine Negative      Urobilinogen Urine Normal      Nitrite Urine Negative      Leukocyte Esterase Urine Small (*)     Mucus Urine Present (*)     RBC Urine <1      WBC Urine 10 (*)     Squamous Epithelials Urine <1     COMPREHENSIVE METABOLIC PANEL -  Abnormal    Sodium 136      Potassium 4.6      Carbon Dioxide (CO2) 24      Anion Gap 8      Urea Nitrogen 21.7      Creatinine 0.94      GFR Estimate 56 (*)     Calcium 9.3      Chloride 104      Glucose 125 (*)     Alkaline Phosphatase 74      AST 15      ALT 10      Protein Total 6.6      Albumin 4.2      Bilirubin Total 0.2     CBC WITH PLATELETS AND DIFFERENTIAL - Abnormal    WBC Count 5.4      RBC Count 3.72 (*)     Hemoglobin 9.6 (*)     Hematocrit 30.8 (*)     MCV 83      MCH 25.8 (*)     MCHC 31.2 (*)     RDW 15.3 (*)     Platelet Count 210      % Neutrophils 78      % Lymphocytes 10      % Monocytes 11      % Eosinophils 1      % Basophils 0      % Immature Granulocytes 0      NRBCs per 100 WBC 0      Absolute Neutrophils 4.2      Absolute Lymphocytes 0.5 (*)     Absolute Monocytes 0.6      Absolute Eosinophils 0.0      Absolute Basophils 0.0      Absolute Immature Granulocytes 0.0      Absolute NRBCs 0.0     TROPONIN T, HIGH SENSITIVITY - Abnormal    Troponin T, High Sensitivity 33 (*)    TROPONIN T, HIGH SENSITIVITY - Abnormal    Troponin T, High Sensitivity 30 (*)    IRON AND IRON BINDING CAPACITY - Abnormal    Iron 31 (*)     Iron Binding Capacity 362      Iron Sat Index 9 (*)    INFLUENZA A/B, RSV, & SARS-COV2 PCR - Normal    Influenza A PCR Negative      Influenza B PCR Negative      RSV PCR Negative      SARS CoV2 PCR Negative     LIPASE - Normal    Lipase 39     LACTIC ACID WHOLE BLOOD - Normal    Lactic Acid 1.4     RETICULOCYTE COUNT - Normal    % Reticulocyte 1.2      Absolute Reticulocyte 0.045     FERRITIN   TROPONIN T, HIGH SENSITIVITY   URINE CULTURE     Procedures   None    Emergency Department Course & Assessments:    Interventions:  Medications   senna-docusate (SENOKOT-S/PERICOLACE) 8.6-50 MG per tablet 1 tablet (has no administration in time range)     Or   senna-docusate (SENOKOT-S/PERICOLACE) 8.6-50 MG per tablet 2 tablet (has no administration in time range)   ondansetron (ZOFRAN  ODT) ODT tab 4 mg (has no administration in time range)     Or   ondansetron (ZOFRAN) injection 4 mg (has no administration in time range)   acetaminophen (TYLENOL) tablet 650 mg (650 mg Oral $Given 1/8/24 2250)     Or   acetaminophen (TYLENOL) Suppository 650 mg ( Rectal See Alternative 1/8/24 2250)   melatonin tablet 1 mg (has no administration in time range)   sertraline (ZOLOFT) tablet 50 mg (has no administration in time range)   alum & mag hydroxide-simethicone (MAALOX) suspension 15 mL (has no administration in time range)   sodium chloride 0.9 % infusion ( Intravenous $New Bag 1/8/24 2242)   pantoprazole (PROTONIX) EC tablet 40 mg (has no administration in time range)   sodium chloride 0.9% BOLUS 500 mL (0 mLs Intravenous Stopped 1/8/24 1909)   gadobutrol (GADAVIST) injection 10 mL (10 mLs Intravenous $Given 1/8/24 1706)   aspirin (ASA) chewable tablet 324 mg (324 mg Oral $Given 1/8/24 2101)     Assessments:  1511 I obtained history and examined the patient as noted above.  2051 I rechecked the patient and explained findings.     Independent Interpretation (X-rays, CTs, rhythm strip):  None    Consultations/Discussion of Management or Tests:  2122 I spoke with Dr. Morris of the hospitalist team regarding the patient, who accepted the patient for admission.    Social Determinants of Health affecting care:   None    Disposition:  The patient was admitted to observation under the care of Dr. Morris.     Impression & Plan    CMS Diagnoses: None    Medical Decision Making:  Keysha Anders is a very pleasant 89-year-old female presenting to the emergency department for evaluation of generalized weakness and vomiting.  VS on presentation are within normal limits.  A broad differential diagnosis was considered including not limited to, gastroenteritis, metabolic derangement, anemia, ACS, CNS pathology (CVA, TIA), malignancy, colitis, bowel obstruction, perforation, foodborne illness, among others.  History is  obtained from both the patient and supplemented by daughter, her primary caregiver present at bedside.  At the time of my evaluation, she denies any abdominal pain whatsoever, and exhibits no tenderness on initial and repeat evaluation.  She denies abdominal pain throughout her entire emergency department course.  Certainly given the presence of both vomiting and diarrhea (which has now since resolved), viral causes on the differential.  No known sick contacts or clear antecedent antibiotic exposure.  In the absence of any abdominal pain, both subjectively or objectively on my exam, do feel emergent CT imaging can be deferred safely at this time.  Other causes for weakness were considered.  She does describe experiencing intermittent left-sided tingling/numbness to her arm.  On exam no clear sensory deficits are appreciated, though she does have some weakness to dorsiflexion of the left foot.  I considered central nervous system pathology, prompting MRI of the brain.  Fortunately, no evidence of cerebral ischemia, or other acute processes noted.  I also considered anginal equivalent, EKG was performed, demonstrating sinus rhythm without findings of acute ischemia.  Her initial troponin returned elevated at 33, with delta troponin improved at 30.  This may be type II demand ischemia in the context of volume depletion from vomiting and diarrhea, though atypical anginal equivalent also considered given patient's female gender, advanced age.  On reassessment, we discussed results of the above studies.  Note made of mild pyuria, the patient denies any associated urinary symptoms.  Will obtain urine culture at this time for further evaluation.  We discussed disposition options including hospital observation versus discharge home.  Given the symptom complex, weakness noted by daughter, some gait instability by daughter, as well as elevated troponin values, will plan hospital observation for close monitoring and further  evaluation.  Case discussed with hospitalist team who have accepted patient for admission.  Patient and daughter updated and in agreement with plan of care.    Diagnosis:    ICD-10-CM    1. Generalized weakness  R53.1       2. Numbness and tingling in left arm  R20.0     R20.2       3. Elevated troponin  R79.89       4. Vomiting and diarrhea  R11.10     R19.7           Scribe Disclosure:  JARVIS, Mónica Reyes, am serving as a scribe at 3:40 PM on 1/8/2024 to document services personally performed by Kalyan Robbins MD based on my observations and the provider's statements to me.   1/8/2024   Kalyan Robbins MD Roach, Brian Donald, MD  01/08/24 [id number]

## 2024-01-08 NOTE — TELEPHONE ENCOUNTER
I agree this sounds like she should go to the EMERGENCY ROOM considering substantial change in status/weakness.     Influenza has caused a lot of nausea/emesis this year so would want to get checked for htat as well.     Joel Wegener,MD

## 2024-01-09 ENCOUNTER — APPOINTMENT (OUTPATIENT)
Dept: PHYSICAL THERAPY | Facility: CLINIC | Age: 89
End: 2024-01-09
Attending: STUDENT IN AN ORGANIZED HEALTH CARE EDUCATION/TRAINING PROGRAM
Payer: MEDICARE

## 2024-01-09 VITALS
RESPIRATION RATE: 15 BRPM | TEMPERATURE: 97.8 F | SYSTOLIC BLOOD PRESSURE: 117 MMHG | OXYGEN SATURATION: 98 % | HEART RATE: 79 BPM | WEIGHT: 98.5 LBS | DIASTOLIC BLOOD PRESSURE: 64 MMHG | HEIGHT: 60 IN | BODY MASS INDEX: 19.34 KG/M2

## 2024-01-09 LAB
ANION GAP SERPL CALCULATED.3IONS-SCNC: 8 MMOL/L (ref 7–15)
ATRIAL RATE - MUSE: 95 BPM
BUN SERPL-MCNC: 17.1 MG/DL (ref 8–23)
CALCIUM SERPL-MCNC: 8.9 MG/DL (ref 8.2–9.6)
CHLORIDE SERPL-SCNC: 106 MMOL/L (ref 98–107)
CREAT SERPL-MCNC: 0.83 MG/DL (ref 0.51–0.95)
DEPRECATED HCO3 PLAS-SCNC: 22 MMOL/L (ref 22–29)
DIASTOLIC BLOOD PRESSURE - MUSE: NORMAL MMHG
EGFRCR SERPLBLD CKD-EPI 2021: 65 ML/MIN/1.73M2
ERYTHROCYTE [DISTWIDTH] IN BLOOD BY AUTOMATED COUNT: 15.3 % (ref 10–15)
FERRITIN SERPL-MCNC: 25 NG/ML (ref 11–328)
GLUCOSE SERPL-MCNC: 86 MG/DL (ref 70–99)
HCT VFR BLD AUTO: 27.6 % (ref 35–47)
HGB BLD-MCNC: 8.7 G/DL (ref 11.7–15.7)
INTERPRETATION ECG - MUSE: NORMAL
MAGNESIUM SERPL-MCNC: 2 MG/DL (ref 1.7–2.3)
MCH RBC QN AUTO: 26.4 PG (ref 26.5–33)
MCHC RBC AUTO-ENTMCNC: 31.5 G/DL (ref 31.5–36.5)
MCV RBC AUTO: 84 FL (ref 78–100)
P AXIS - MUSE: 55 DEGREES
PHOSPHATE SERPL-MCNC: 3.1 MG/DL (ref 2.5–4.5)
PLATELET # BLD AUTO: 194 10E3/UL (ref 150–450)
POTASSIUM SERPL-SCNC: 3.9 MMOL/L (ref 3.4–5.3)
PR INTERVAL - MUSE: 202 MS
QRS DURATION - MUSE: 72 MS
QT - MUSE: 320 MS
QTC - MUSE: 402 MS
R AXIS - MUSE: 25 DEGREES
RBC # BLD AUTO: 3.3 10E6/UL (ref 3.8–5.2)
SODIUM SERPL-SCNC: 136 MMOL/L (ref 135–145)
SYSTOLIC BLOOD PRESSURE - MUSE: NORMAL MMHG
T AXIS - MUSE: 47 DEGREES
TROPONIN T SERPL HS-MCNC: 30 NG/L
TROPONIN T SERPL HS-MCNC: 31 NG/L
VENTRICULAR RATE- MUSE: 95 BPM
WBC # BLD AUTO: 2.8 10E3/UL (ref 4–11)

## 2024-01-09 PROCEDURE — 85027 COMPLETE CBC AUTOMATED: CPT | Performed by: STUDENT IN AN ORGANIZED HEALTH CARE EDUCATION/TRAINING PROGRAM

## 2024-01-09 PROCEDURE — 250N000013 HC RX MED GY IP 250 OP 250 PS 637: Performed by: STUDENT IN AN ORGANIZED HEALTH CARE EDUCATION/TRAINING PROGRAM

## 2024-01-09 PROCEDURE — G0378 HOSPITAL OBSERVATION PER HR: HCPCS

## 2024-01-09 PROCEDURE — 84484 ASSAY OF TROPONIN QUANT: CPT | Mod: 91 | Performed by: STUDENT IN AN ORGANIZED HEALTH CARE EDUCATION/TRAINING PROGRAM

## 2024-01-09 PROCEDURE — 84100 ASSAY OF PHOSPHORUS: CPT | Performed by: STUDENT IN AN ORGANIZED HEALTH CARE EDUCATION/TRAINING PROGRAM

## 2024-01-09 PROCEDURE — 83735 ASSAY OF MAGNESIUM: CPT | Performed by: STUDENT IN AN ORGANIZED HEALTH CARE EDUCATION/TRAINING PROGRAM

## 2024-01-09 PROCEDURE — 250N000013 HC RX MED GY IP 250 OP 250 PS 637: Performed by: INTERNAL MEDICINE

## 2024-01-09 PROCEDURE — 250N000011 HC RX IP 250 OP 636: Performed by: INTERNAL MEDICINE

## 2024-01-09 PROCEDURE — 36415 COLL VENOUS BLD VENIPUNCTURE: CPT | Performed by: STUDENT IN AN ORGANIZED HEALTH CARE EDUCATION/TRAINING PROGRAM

## 2024-01-09 PROCEDURE — 99239 HOSP IP/OBS DSCHRG MGMT >30: CPT | Performed by: HOSPITALIST

## 2024-01-09 PROCEDURE — 80048 BASIC METABOLIC PNL TOTAL CA: CPT | Performed by: STUDENT IN AN ORGANIZED HEALTH CARE EDUCATION/TRAINING PROGRAM

## 2024-01-09 PROCEDURE — 97530 THERAPEUTIC ACTIVITIES: CPT | Mod: GP

## 2024-01-09 PROCEDURE — 96361 HYDRATE IV INFUSION ADD-ON: CPT

## 2024-01-09 PROCEDURE — 258N000003 HC RX IP 258 OP 636: Performed by: STUDENT IN AN ORGANIZED HEALTH CARE EDUCATION/TRAINING PROGRAM

## 2024-01-09 PROCEDURE — 97161 PT EVAL LOW COMPLEX 20 MIN: CPT | Mod: GP

## 2024-01-09 PROCEDURE — 97116 GAIT TRAINING THERAPY: CPT | Mod: GP

## 2024-01-09 PROCEDURE — 96374 THER/PROPH/DIAG INJ IV PUSH: CPT

## 2024-01-09 RX ORDER — HYDROMORPHONE HCL IN WATER/PF 6 MG/30 ML
0.2 PATIENT CONTROLLED ANALGESIA SYRINGE INTRAVENOUS ONCE
Status: COMPLETED | OUTPATIENT
Start: 2024-01-09 | End: 2024-01-09

## 2024-01-09 RX ORDER — LIDOCAINE 4 G/G
1 PATCH TOPICAL
Status: DISCONTINUED | OUTPATIENT
Start: 2024-01-09 | End: 2024-01-09 | Stop reason: HOSPADM

## 2024-01-09 RX ADMIN — Medication 1 MG: at 01:10

## 2024-01-09 RX ADMIN — SODIUM CHLORIDE: 9 INJECTION, SOLUTION INTRAVENOUS at 05:37

## 2024-01-09 RX ADMIN — ACETAMINOPHEN 650 MG: 325 TABLET, FILM COATED ORAL at 09:41

## 2024-01-09 RX ADMIN — HYDROMORPHONE HYDROCHLORIDE 0.2 MG: 0.2 INJECTION, SOLUTION INTRAMUSCULAR; INTRAVENOUS; SUBCUTANEOUS at 01:10

## 2024-01-09 RX ADMIN — ACETAMINOPHEN 650 MG: 325 TABLET, FILM COATED ORAL at 02:32

## 2024-01-09 RX ADMIN — PANTOPRAZOLE SODIUM 40 MG: 40 TABLET, DELAYED RELEASE ORAL at 08:56

## 2024-01-09 RX ADMIN — SERTRALINE HYDROCHLORIDE 50 MG: 50 TABLET ORAL at 08:56

## 2024-01-09 RX ADMIN — LIDOCAINE 1 PATCH: 4 PATCH TOPICAL at 00:17

## 2024-01-09 ASSESSMENT — ACTIVITIES OF DAILY LIVING (ADL)
ADLS_ACUITY_SCORE: 26
ADLS_ACUITY_SCORE: 24
ADLS_ACUITY_SCORE: 27
ADLS_ACUITY_SCORE: 26
DEPENDENT_IADLS:: CLEANING;COOKING;LAUNDRY;SHOPPING;MEAL PREPARATION;MEDICATION MANAGEMENT;MONEY MANAGEMENT;TRANSPORTATION
ADLS_ACUITY_SCORE: 27

## 2024-01-09 NOTE — H&P
"[facility]  History and Physical - Hospitalist Service  Date of Admission:  1/8/2024    Assessment & Plan     Ms. Keysha Anders is a 89 year old female with a history of  spinal stenosis, hyperparthayroid, anxiety, gastric and duodenal PUD admitted on 1/8/2024 with non specific symtpoms, found to have an elevated troponin and concern for possible anginal equivalent vs referred GI pain. Admitted under observation for monitoring and work up as below.      Elevated troponin, suspect demand ischemia    Rule out possible ACS / anginal equivalent with non specific symptoms   Patient presenting with complaints of feeling \"off\" reporting some GI discomfort, intermittent nausea and vomiting in addition to pains shooting down her shoulder. She has some non specific symptoms and has a hard time expressing specifically what she is feeling. She does state \"I feel off\" these symptoms prompted her daughter to bring her in for evaluation.   On arrival, VSS, lab work reveals normocytic anemia, mild troponin elevation with no other abnormalities.  EKG nonischemic.  At this time the cause of her symptoms remains unclear, primary differential includes GI referred pain related to possible PUD versus an anginal equivalent given slightly elevated troponins. Out of an abundance of caution we will rule out an anginal equivalent with serial troponins and continuous cardiac telemetry under observation admission.  I do not feel that her symptoms warrant a TTE at this time however she will her picture changes could be considered.  Symptoms most likely related to history of PUD as outlined below.  -Serial troponins   -Continuous cardiac telemetry  -EKG as needed anginal equivalent  -GI cocktail PRN, if helps likely GI source of pain  -IVF maintenance given nausea and vomiting and troponin elevation      Anemia, normocytic   History of PUD-duodenal and gastric  Anemia appears to be chronic in nature, does have a history of " "GI ulcers in the past -gastric and duodenal seen at [facility] in the past.   - no evidence for active bleeding at this time, denies any hematemesis or hematochezia -corroborates his history  - CBC QAM   - PPI daily  - Anemia studies pending, could consider iron replacement if low  - GI cocktail PRN, if helps her symptoms likely represents GI source of pain  -IVF maintenance given nausea and vomiting and troponin elevation      Gait instability / Weakness  - PT evaluation pending     Vomiting and diarrhea, resolved  - PRN symptom management, possible contributed to symptoms above    -IVF maintenance given nausea and vomiting and troponin elevation      Anxiety  - PTA sertraline        Observation Goals: -diagnostic tests and consults completed and resulted, -vital signs normal or at patient baseline, Nurse to notify provider when observation goals have been met and patient is ready for discharge.  Diet: Regular Diet Adult  DVT Prophylaxis: Pneumatic Compression Devices  Irizarry Catheter: Not present  Lines: None     Cardiac Monitoring: ACTIVE order. Indication: Chest pain/ ACS rule out (24 hours)  Code Status: Full Code     Clinically Significant Risk Factors Present on Admission                                  Disposition Plan      Expected Discharge Date: 01/09/2024                  Gwendolyn Morris DO  Hospitalist Service  [facility]  Securely message with [employer] (more info)  Text page via [employer] Paging/Directory     ______________________________________________________________________    Chief Complaint   \"Feeling off\"     History is obtained from the patient, electronic health record, emergency department physician, and patient's daughter    History of Present Illness   Ms. Keysha Anders is a 89 year old female with a history of  spinal stenosis, hyperparthayroid, anxiety admitted on 1/8/2024 with non specific symtpoms, found to have an elevated troponin and concern for possible anginal " "equivalent.    Patient presenting with complaints of feeling \"off\" reporting some GI discomfort, intermittent nausea and vomiting in addition to pains shooting down her shoulder. She has some non specific symptoms and has a hard time expressing specifically what she is feeling. She does state \"I feel off\" these symptoms prompted her daughter to bring her in for evaluation.     In the ED, she does not report any chest pain, pressure or shortness of breath.  She does reports some discomfort to the abdomen and does have some episodes during the interview.  She has no further nausea or vomiting at this time.  She does report that she continues to feel off.  History is limited secondary to the patient's age however daughter corroborates history.    They report no other new concerns or complaints at this time.      Past Medical History    No past medical history on file.    Past Surgical History   No past surgical history on file.    Prior to Admission Medications   Prior to Admission Medications   Prescriptions Last Dose Informant Patient Reported? Taking?   sertraline (ZOLOFT) 50 MG tablet   No No   Sig: Take 1 tablet (50 mg) by mouth daily      Facility-Administered Medications: None        Physical Exam   Vital Signs: Temp: 97.6  F (36.4  C) Temp src: Oral BP: 127/72 Pulse: 95   Resp: 20 SpO2: 99 % O2 Device: None (Room air)    Weight: 0 lbs 0 oz    Constitutional: awake, alert, cooperative, no apparent distress, and appears stated age  HEENT: Normocephalic, atraumatic  Respiratory: Normal work of breathing, good air exchange, clear to auscultation bilaterally, no crackles or wheezing   Cardiovascular: Regular rate and rhythm, no murmurs appreciated  GI: Soft and nontender to palpation, nondistended, no rebound or guarding  Skin: normal skin color, texture, turgor  Musculoskeletal: No deformities, no edema present  Neurologic: Alert and oriented, no focal deficits   Neuropsychiatric: General: normal, calm and normal " eye contact     Medical Decision Making       75 MINUTES SPENT BY ME on the date of service doing chart review, history, exam, documentation & further activities per the note.      Data   ------------------------- PAST 24 HR DATA REVIEWED -----------------------------------------------    I have personally reviewed the following data over the past 24 hrs:    5.4  \   9.6 (L)   / 210     136 104 21.7 /  125 (H)   4.6 24 0.94 \     ALT: 10 AST: 15 AP: 74 TBILI: 0.2   ALB: 4.2 TOT PROTEIN: 6.6 LIPASE: 39     Trop: 30 (H) BNP: N/A     Procal: N/A CRP: N/A Lactic Acid: 1.4         Imaging results reviewed over the past 24 hrs:   Recent Results (from the past 24 hour(s))   MR Brain w/o & w Contrast    Narrative    EXAM: MR BRAIN W/O and W CONTRAST, MRA NECK (CAROTIDS) W/O and W CONTRAST, MRA BRAIN ([place]) W/O CONTRAST  LOCATION: [facility]  DATE/TIME: 1/8/2024 5:56 PM CST    INDICATION: Left sided numbness  COMPARISON: None.  CONTRAST: 10 mL Gadavist  TECHNIQUE:   1) Routine multiplanar multisequence head MRI with and without intravenous contrast.  2) 3D time-of-flight head MRA without intravenous contrast.  3) Neck MRA without and with IV contrast. Stenosis measurements made according to NASCET criteria unless otherwise specified.    FINDINGS:  HEAD MRI:  INTRACRANIAL CONTENTS: No acute or subacute infarct. No mass, acute hemorrhage, or extra-axial fluid collections. Confluent nonspecific T2/FLAIR hyperintensities within the cerebral white matter most consistent with advanced microvascular ischemic   change. Moderate generalized cerebral atrophy. No hydrocephalus. Normal position of the cerebellar tonsils. No pathologic contrast enhancement.    SELLA: No abnormality accounting for technique.    OSSEOUS STRUCTURES/SOFT TISSUES: Normal marrow signal.     ORBITS: Prior bilateral cataract surgery. Visualized portions of the orbits are otherwise unremarkable.     SINUSES/MASTOIDS: No  paranasal sinus mucosal disease. No middle ear or mastoid effusion.     HEAD MRA:   ANTERIOR CIRCULATION: No stenosis/occlusion, aneurysm, or high flow vascular malformation. Fetal origin of both posterior cerebral arteries from the anterior circulation.    POSTERIOR CIRCULATION: No stenosis/occlusion, aneurysm, or high flow vascular malformation. Dominant right and smaller left vertebral artery contribute to a normal basilar artery.     NECK MRA:   RIGHT CAROTID: No measurable stenosis or dissection.    LEFT CAROTID: Atherosclerotic plaque results in less than 50% stenosis in the proximal ICA. No dissection.    VERTEBRAL ARTERIES: No focal stenosis or dissection on the right. The small left vertebral artery is not well assessed. Dominant right and smaller left vertebral arteries.    AORTIC ARCH: Classic aortic arch anatomy with no significant stenosis at the origin of the great vessels.      Impression    IMPRESSION:  HEAD MRI:   1.  No acute intracranial process.  2.  Generalized brain atrophy and presumed microvascular ischemic changes as detailed above.    HEAD MRA:   1.  No large vessel occlusion or hemodynamically significant stenosis.    NECK MRA:  1.  No large vessel occlusion or hemodynamically significant stenosis.  2.  The nondominant left vertebral artery is small and suboptimally assessed.   MRA Angiogram Head w/o Contrast    Narrative    EXAM: MR BRAIN W/O and W CONTRAST, MRA NECK (CAROTIDS) W/O and W CONTRAST, MRA BRAIN ([place]) W/O CONTRAST  LOCATION: [facility]  DATE/TIME: 1/8/2024 5:56 PM CST    INDICATION: Left sided numbness  COMPARISON: None.  CONTRAST: 10 mL Gadavist  TECHNIQUE:   1) Routine multiplanar multisequence head MRI with and without intravenous contrast.  2) 3D time-of-flight head MRA without intravenous contrast.  3) Neck MRA without and with IV contrast. Stenosis measurements made according to NASCET criteria unless otherwise  specified.    FINDINGS:  HEAD MRI:  INTRACRANIAL CONTENTS: No acute or subacute infarct. No mass, acute hemorrhage, or extra-axial fluid collections. Confluent nonspecific T2/FLAIR hyperintensities within the cerebral white matter most consistent with advanced microvascular ischemic   change. Moderate generalized cerebral atrophy. No hydrocephalus. Normal position of the cerebellar tonsils. No pathologic contrast enhancement.    SELLA: No abnormality accounting for technique.    OSSEOUS STRUCTURES/SOFT TISSUES: Normal marrow signal.     ORBITS: Prior bilateral cataract surgery. Visualized portions of the orbits are otherwise unremarkable.     SINUSES/MASTOIDS: No paranasal sinus mucosal disease. No middle ear or mastoid effusion.     HEAD MRA:   ANTERIOR CIRCULATION: No stenosis/occlusion, aneurysm, or high flow vascular malformation. Fetal origin of both posterior cerebral arteries from the anterior circulation.    POSTERIOR CIRCULATION: No stenosis/occlusion, aneurysm, or high flow vascular malformation. Dominant right and smaller left vertebral artery contribute to a normal basilar artery.     NECK MRA:   RIGHT CAROTID: No measurable stenosis or dissection.    LEFT CAROTID: Atherosclerotic plaque results in less than 50% stenosis in the proximal ICA. No dissection.    VERTEBRAL ARTERIES: No focal stenosis or dissection on the right. The small left vertebral artery is not well assessed. Dominant right and smaller left vertebral arteries.    AORTIC ARCH: Classic aortic arch anatomy with no significant stenosis at the origin of the great vessels.      Impression    IMPRESSION:  HEAD MRI:   1.  No acute intracranial process.  2.  Generalized brain atrophy and presumed microvascular ischemic changes as detailed above.    HEAD MRA:   1.  No large vessel occlusion or hemodynamically significant stenosis.    NECK MRA:  1.  No large vessel occlusion or hemodynamically significant stenosis.  2.  The nondominant left  vertebral artery is small and suboptimally assessed.   MRA Angiogram Neck w/o & w Contrast    Narrative    EXAM: MR BRAIN W/O and W CONTRAST, MRA NECK (CAROTIDS) W/O and W CONTRAST, MRA BRAIN ([place] OF [place]) W/O CONTRAST  LOCATION: [facility]  DATE/TIME: 1/8/2024 5:56 PM CST    INDICATION: Left sided numbness  COMPARISON: None.  CONTRAST: 10 mL Gadavist  TECHNIQUE:   1) Routine multiplanar multisequence head MRI with and without intravenous contrast.  2) 3D time-of-flight head MRA without intravenous contrast.  3) Neck MRA without and with IV contrast. Stenosis measurements made according to NASCET criteria unless otherwise specified.    FINDINGS:  HEAD MRI:  INTRACRANIAL CONTENTS: No acute or subacute infarct. No mass, acute hemorrhage, or extra-axial fluid collections. Confluent nonspecific T2/FLAIR hyperintensities within the cerebral white matter most consistent with advanced microvascular ischemic   change. Moderate generalized cerebral atrophy. No hydrocephalus. Normal position of the cerebellar tonsils. No pathologic contrast enhancement.    SELLA: No abnormality accounting for technique.    OSSEOUS STRUCTURES/SOFT TISSUES: Normal marrow signal.     ORBITS: Prior bilateral cataract surgery. Visualized portions of the orbits are otherwise unremarkable.     SINUSES/MASTOIDS: No paranasal sinus mucosal disease. No middle ear or mastoid effusion.     HEAD MRA:   ANTERIOR CIRCULATION: No stenosis/occlusion, aneurysm, or high flow vascular malformation. Fetal origin of both posterior cerebral arteries from the anterior circulation.    POSTERIOR CIRCULATION: No stenosis/occlusion, aneurysm, or high flow vascular malformation. Dominant right and smaller left vertebral artery contribute to a normal basilar artery.     NECK MRA:   RIGHT CAROTID: No measurable stenosis or dissection.    LEFT CAROTID: Atherosclerotic plaque results in less than 50% stenosis in the proximal ICA. No  dissection.    VERTEBRAL ARTERIES: No focal stenosis or dissection on the right. The small left vertebral artery is not well assessed. Dominant right and smaller left vertebral arteries.    AORTIC ARCH: Classic aortic arch anatomy with no significant stenosis at the origin of the great vessels.      Impression    IMPRESSION:  HEAD MRI:   1.  No acute intracranial process.  2.  Generalized brain atrophy and presumed microvascular ischemic changes as detailed above.    HEAD MRA:   1.  No large vessel occlusion or hemodynamically significant stenosis.    NECK MRA:  1.  No large vessel occlusion or hemodynamically significant stenosis.  2.  The nondominant left vertebral artery is small and suboptimally assessed.

## 2024-01-09 NOTE — CONSULTS
Care Management Initial Consult    General Information  Assessment completed with: Chastity Montes via phone  [phone]  Type of CM/SW Visit: Initial Assessment    Primary Care Provider verified and updated as needed: Yes   Readmission within the last 30 days: no previous admission in last 30 days      Reason for Consult: discharge planning    Communication Assessment  Patient's communication style: spoken language (English or Bilingual)    Hearing Difficulty or Deaf: yes   Wear Glasses or Blind: yes    Cognitive  Cognitive/Neuro/Behavioral: WDL                      Living Environment:   People in home: child(romero), adult     Current living Arrangements: house      Able to return to prior arrangements: yes     Family/Social Support:  Care provided by: child(romero)  Provides care for: no one, unable/limited ability to care for self  Marital Status:   Children          Description of Support System: Supportive, Involved       Current Resources:   Patient receiving home care services: No  Community Resources: None  Equipment currently used at home: walker, rolling  Supplies currently used at home: None    Lifestyle & Psychosocial Needs:  Social Determinants of Health     Food Insecurity: Not on file   Depression: Not at risk (1/19/2023)    PHQ-2     PHQ-2 Score: 0   Housing Stability: Not on file   Tobacco Use: Medium Risk (3/17/2023)    Patient History     Smoking Tobacco Use: Former     Smokeless Tobacco Use: Former     Passive Exposure: Not on file   Financial Resource Strain: Not on file   Alcohol Use: Not on file   Transportation Needs: Not on file   Physical Activity: Not on file   Interpersonal Safety: Not on file   Stress: Not on file   Social Connections: Not on file       Functional Status:  Prior to admission patient needed assistance:   Dependent ADLs:: Independent, Bathing  Dependent IADLs:: Cleaning, Cooking, Laundry, Shopping, Meal Preparation, Medication Management, Money Management,  Transportation  Assesssment of Functional Status: At functional baseline    Additional Information:  CM consulted for discharge planning, spoke with dtr/primary caregiver Meka via phone. Pt lives in a home with Meka and is independent with mobility and ADLs at baseline. Meka assists with bathing and IADLs. Provider would like to order HC PT/OT at discharge, Meka is agreeable to this and would like HHA as well. Referral sent to HC hub, awaiting accepting agency at this time. Provider updated, Meka denies other needs at this time.     Anne Pang RN BSN   Inpatient Care Coordination  [facility]   Phone [phone]

## 2024-01-09 NOTE — ED NOTES
[facility]  ED Nurse Handoff Report    ED Chief complaint: Generalized Weakness and Vomiting  . ED Diagnosis:   Final diagnoses:   Generalized weakness   Numbness and tingling in left arm   Elevated troponin       Allergies:   Allergies   Allergen Reactions    Dimethicone-Zinc Oxide Anaphylaxis    Seasonal Allergies     Penicillins Swelling, Itching and Rash     Other reaction(s): Edema         Code Status: Full Code    Activity level - Baseline/Home:  standby.  Activity Level - Current:   standby.   Lift room needed: No.   Bariatric: No   Needed: No   Isolation: No.   Infection: Not Applicable.     Respiratory status: Room air    Vital Signs (within 30 minutes):   Vitals:    01/08/24 1432   BP: 127/72   Pulse: 95   Resp: 20   Temp: 97.6  F (36.4  C)   TempSrc: Oral   SpO2: 99%       Cardiac Rhythm:  ,      Pain level:    Patient confused: No.   Patient Falls Risk: nonskid shoes/slippers when out of bed, arm band in place, patient and family education, and assistive device/personal items within reach.   Elimination Status: Has voided     Patient Report - Initial Complaint: Generalized weakness, vomiting.   Focused Assessment: Keysha Anders is a 89 year old female with history of stroke, recent dementia diagnosis, osteoporosis, iron deficiency anemia, gastritis, and encephalitis who presents to the ED with her daughter, whom she currently lives with and is her caregiver, for evaluation of generalized weakness, vomiting, and diarrhea. Keysha reports she developed abdominal pain and vomiting last night and has had 2-3 episodes of diarrhea daily for the last 2 days. Her abdominal pain has since resolved. She also endorses numbness/tingling to her right hand extending to her wrist over the last few days. Her daughter notes the patient was intentional with her gait this morning, using walls and furniture to assist her, which is not her baseline. The patient denies weakness, black or  bloody stools, fevers, runny nose, sore throat, cough, headache, chest pain, or shortness of breath. She denies lower extremity or right upper extremity symptoms such as numbness, tingling, or weakness. No blood thinners use.      Abnormal Results:   Labs Ordered and Resulted from Time of ED Arrival to Time of ED Departure   ROUTINE UA WITH MICROSCOPIC REFLEX TO CULTURE - Abnormal       Result Value    Color Urine Light Yellow      Appearance Urine Clear      Glucose Urine Negative      Bilirubin Urine Negative      Ketones Urine Negative      Specific Gravity Urine 1.012      Blood Urine Negative      pH Urine 6.0      Protein Albumin Urine Negative      Urobilinogen Urine Normal      Nitrite Urine Negative      Leukocyte Esterase Urine Small (*)     Mucus Urine Present (*)     RBC Urine <1      WBC Urine 10 (*)     Squamous Epithelials Urine <1     COMPREHENSIVE METABOLIC PANEL - Abnormal    Sodium 136      Potassium 4.6      Carbon Dioxide (CO2) 24      Anion Gap 8      Urea Nitrogen 21.7      Creatinine 0.94      GFR Estimate 56 (*)     Calcium 9.3      Chloride 104      Glucose 125 (*)     Alkaline Phosphatase 74      AST 15      ALT 10      Protein Total 6.6      Albumin 4.2      Bilirubin Total 0.2     CBC WITH PLATELETS AND DIFFERENTIAL - Abnormal    WBC Count 5.4      RBC Count 3.72 (*)     Hemoglobin 9.6 (*)     Hematocrit 30.8 (*)     MCV 83      MCH 25.8 (*)     MCHC 31.2 (*)     RDW 15.3 (*)     Platelet Count 210      % Neutrophils 78      % Lymphocytes 10      % Monocytes 11      % Eosinophils 1      % Basophils 0      % Immature Granulocytes 0      NRBCs per 100 WBC 0      Absolute Neutrophils 4.2      Absolute Lymphocytes 0.5 (*)     Absolute Monocytes 0.6      Absolute Eosinophils 0.0      Absolute Basophils 0.0      Absolute Immature Granulocytes 0.0      Absolute NRBCs 0.0     TROPONIN T, HIGH SENSITIVITY - Abnormal    Troponin T, High Sensitivity 33 (*)    TROPONIN T, HIGH SENSITIVITY - Abnormal     Troponin T, High Sensitivity 30 (*)    INFLUENZA A/B, RSV, & SARS-COV2 PCR - Normal    Influenza A PCR Negative      Influenza B PCR Negative      RSV PCR Negative      SARS CoV2 PCR Negative     LIPASE - Normal    Lipase 39     LACTIC ACID WHOLE BLOOD - Normal    Lactic Acid 1.4     URINE CULTURE        MR Brain w/o & w Contrast   Final Result   IMPRESSION:   HEAD MRI:    1.  No acute intracranial process.   2.  Generalized brain atrophy and presumed microvascular ischemic changes as detailed above.      HEAD MRA:    1.  No large vessel occlusion or hemodynamically significant stenosis.      NECK MRA:   1.  No large vessel occlusion or hemodynamically significant stenosis.   2.  The nondominant left vertebral artery is small and suboptimally assessed.      MRA Angiogram Head w/o Contrast   Final Result   IMPRESSION:   HEAD MRI:    1.  No acute intracranial process.   2.  Generalized brain atrophy and presumed microvascular ischemic changes as detailed above.      HEAD MRA:    1.  No large vessel occlusion or hemodynamically significant stenosis.      NECK MRA:   1.  No large vessel occlusion or hemodynamically significant stenosis.   2.  The nondominant left vertebral artery is small and suboptimally assessed.      MRA Angiogram Neck w/o & w Contrast   Final Result   IMPRESSION:   HEAD MRI:    1.  No acute intracranial process.   2.  Generalized brain atrophy and presumed microvascular ischemic changes as detailed above.      HEAD MRA:    1.  No large vessel occlusion or hemodynamically significant stenosis.      NECK MRA:   1.  No large vessel occlusion or hemodynamically significant stenosis.   2.  The nondominant left vertebral artery is small and suboptimally assessed.          Treatments provided: see MAR  Family Comments: daughter at bedside  OBS brochure/video discussed/provided to patient:  Yes  ED Medications:   Medications   senna-docusate (SENOKOT-S/PERICOLACE) 8.6-50 MG per tablet 1 tablet (has no  administration in time range)     Or   senna-docusate (SENOKOT-S/PERICOLACE) 8.6-50 MG per tablet 2 tablet (has no administration in time range)   ondansetron (ZOFRAN ODT) ODT tab 4 mg (has no administration in time range)     Or   ondansetron (ZOFRAN) injection 4 mg (has no administration in time range)   acetaminophen (TYLENOL) tablet 650 mg (has no administration in time range)     Or   acetaminophen (TYLENOL) Suppository 650 mg (has no administration in time range)   melatonin tablet 1 mg (has no administration in time range)   sodium chloride 0.9% BOLUS 500 mL (0 mLs Intravenous Stopped 1/8/24 1909)   gadobutrol (GADAVIST) injection 10 mL (10 mLs Intravenous $Given 1/8/24 1706)   aspirin (ASA) chewable tablet 324 mg (324 mg Oral $Given 1/8/24 2101)       Drips infusing:  No  For the majority of the shift this patient was Green.   Interventions performed were N/a.    Sepsis treatment initiated: No    Cares/treatment/interventions/medications to be completed following ED care:     ED Nurse Name: Maria M Llanes RN  9:45 PM    RECEIVING UNIT ED HANDOFF REVIEW    Above ED Nurse Handoff Report was reviewed: Yes  Reviewed by: Krishan Chavez RN on January 9, 2024 at 12:13 AM

## 2024-01-09 NOTE — PHARMACY-ADMISSION MEDICATION HISTORY
Pharmacist Admission Medication History    Admission medication history is complete. The information provided in this note is only as accurate as the sources available at the time of the update.    Information Source(s): Patient, Family member, and CareEverywhere/SureScripts via in-person    Pertinent Information: none    Changes made to PTA medication list:  Added: PreserVision  Deleted: None  Changed: None    Medication Affordability: no issues       Allergies reviewed with patient and updates made in EHR: yes    Medication History Completed By: Kermit Severson, RPH 1/8/2024 10:22 PM    Prior to Admission medications    Medication Sig Last Dose Taking? Auth Provider Long Term End Date   Multiple Vitamins-Minerals (PRESERVISION AREDS 2) CAPS Take 2 capsules by mouth daily 1/8/2024 at am Yes Unknown, Entered By History     sertraline (ZOLOFT) 50 MG tablet Take 1 tablet (50 mg) by mouth daily 1/8/2024 at am Yes Wegener, Joel Daniel Irwin, MD Yes

## 2024-01-09 NOTE — PLAN OF CARE
Goal Outcome Evaluation:      Plan of Care Reviewed With: patient    Overall Patient Progress: no changeOverall Patient Progress: no change    PRIMARY DIAGNOSIS: GENERALIZED WEAKNESS    OUTPATIENT/OBSERVATION GOALS TO BE MET BEFORE DISCHARGE  1. Orthostatic performed: No    2. Tolerating PO medications: Yes    3. Return to near baseline physical activity: No    4. Cleared for discharge by consultants (if involved): No    Discharge Planner Nurse   Safe discharge environment identified: No  Barriers to discharge: Yes       Entered by: Anne Madden RN 01/09/2024 8:08 AM     Please review provider order for any additional goals.   Nurse to notify provider when observation goals have been met and patient is ready for discharge.

## 2024-01-09 NOTE — PROGRESS NOTES
"   01/09/24 1022   Appointment Info   Signing Clinician's Name / Credentials (PT) Stevo Lucio DPT   Living Environment   People in Home alone   Current Living Arrangements house   Home Accessibility stairs to enter home   Number of Stairs, Main Entrance 2   Stair Railings, Main Entrance   (no railing, but family planning to install hand rail per pt report.)   Living Environment Comments Pt reports living alone in house, 2 JESUS. Currently is visiting family in [place] staying with daughter. (per rounds, pt lives with daughter who is a caretaker?)   Self-Care   Usual Activity Tolerance good   Current Activity Tolerance moderate   Equipment Currently Used at Home walker, rolling   Fall history within last six months no   Activity/Exercise/Self-Care Comment Pt is IND with ADLs at baseline, uses walker for ambulation outside of home, otherwise no AD inside home.   General Information   Onset of Illness/Injury or Date of Surgery 01/08/24   Referring Physician Gwendolyn Morris, DO   Patient/Family Therapy Goals Statement (PT) return home.   Pertinent History of Current Problem (include personal factors and/or comorbidities that impact the POC) Pt is 89 yo female who, per chart, \"history of  spinal stenosis, hyperparthayroid, anxiety, gastric and duodenal PUD admitted on 1/8/2024 with non specific symtpoms, found to have an elevated troponin and concern for possible anginal equivalent vs referred GI pain. Admitted under observation for monitoring and work up as below.       Elevated troponin, suspect demand ischemia    Rule out possible ACS / anginal equivalent with non specific symptoms \"   Existing Precautions/Restrictions fall   Cognition   Cognitive Status Comments not formally assessed, follow commands WFL. [place].   Pain Assessment   Patient Currently in Pain   (reports back pain, R side mostly low back, did not rate pain.)   Posture    Posture Forward head position   Range of Motion (ROM)   Range of Motion " ROM is WFL   Strength (Manual Muscle Testing)   Strength (Manual Muscle Testing) Able to perform L SLR;Able to perform R SLR;Deficits observed during functional mobility   Bed Mobility   Bed Mobility supine-sit   Comment, (Bed Mobility) SBA, use of L bed rail, mild increase in LB pain reported.   Transfers   Transfers sit-stand transfer   Comment, (Transfers) FWW and CGA, pushing up from bed.   Gait/Stairs (Locomotion)   Comment, (Gait/Stairs) Pt ambulated 10' with FWW and CGA, demonstrates step through pattern with decreased R stance time, decreased R step length (appears to have antalgic gait but no report of pain during ambulation). Decreased speed overall, minor L LOB during gait CGA for recovery.   Balance   Balance Comments minor LOB, towards L side with ambulation.   Sensory Examination   Sensory Perception patient reports no sensory changes   Clinical Impression   Criteria for Skilled Therapeutic Intervention Yes, treatment indicated   PT Diagnosis (PT) impaired functional mobility   Influenced by the following impairments pain, decreased strength and activity tolerance, impaired balance.   Functional limitations due to impairments difficulty with bed mobility, transfers, and ambulation   Clinical Presentation (PT Evaluation Complexity) stable   Clinical Presentation Rationale clinical judgment   Clinical Decision Making (Complexity) low complexity   Planned Therapy Interventions (PT) balance training;bed mobility training;gait training;home exercise program;ROM (range of motion);stair training;strengthening;stretching;transfer training;progressive activity/exercise   Risk & Benefits of therapy have been explained evaluation/treatment results reviewed;care plan/treatment goals reviewed;risks/benefits reviewed;current/potential barriers reviewed;participants voiced agreement with care plan;participants included;patient   PT Total Evaluation Time   PT Eval, Low Complexity Minutes (58677) 10   Physical Therapy  Goals   PT Frequency Daily   PT Predicted Duration/Target Date for Goal Attainment 01/16/24   PT Goals Bed Mobility;Transfers;Gait;Stairs   PT: Bed Mobility Independent;Supine to/from sit   PT: Transfers Modified independent;Sit to/from stand;Assistive device   PT: Gait Supervision/stand-by assist;100 feet;Rolling walker   PT: Stairs 2 stairs;Supervision/stand-by assist;Rail on right  (pt states railing is planned to be installed.)   Interventions   Interventions Quick Adds Gait Training;Therapeutic Activity   Therapeutic Activity   Therapeutic Activities: dynamic activities to improve functional performance Minutes (98910) 13   Symptoms Noted During/After Treatment Fatigue;Increased pain   Treatment Detail/Skilled Intervention Pt supine in bed upon PT arrival. Pt agreeable to session, stated she hasn't been out of bed (per nursing notes, pt has ambulated SBA with walker, unclear if pt has some confusion or memory deficits?). Pt performed STS x4 with FWW and CGA, progressed to sBA, cues on UE placement. Pt performed sit > supine SBA. Pt supine in bed, alarm on, all needs within reach. Pt educated on mild balance deficits, recommending continued use of walker with all ambulation, pt verbalized understanding. Encouraged pt call for more gait with nursing staff on this date.   Gait Training   Gait Training Minutes (74410) 10   Symptoms Noted During/After Treatment (Gait Training) fatigue;increased pain   Treatment Detail/Skilled Intervention Pt ambulated 250' with FWW and CGA, progressed to close SBA. Pt cued on walk proximity and maintaining self within walker during turns/pivots. Pt demonstrates. Pt has minor LOB during straight forward gait towards L side always, CGA for recovery. Pt not noticing when PT pointing this out. Pt ambulated 100' without AD, CGA, demonstrates decreased gait speed, antalgic-like gait pattern with decreased L step length, increased back pain reported without walker use.   Distance in Feet  250' + 100'   PT Discharge Planning   PT Plan progress gait with walker vs. no AD (balance task), trial stairs. Confirm d/c with dtr?   PT Discharge Recommendation (DC Rec) home with assist;home with home care physical therapy   PT Rationale for DC Rec Pt at baseline is IND with functional mob, uses walker for ambulation outside of home. Lives alone typically in house, currently with daughter in [place] (somewhat unclear at this time). Pt is below baseline, impaired strength and balance, pt having minor LOB towards L side while using walker. Recommend pt use walker for all ambulation, have support from daughter for IADLs, and HH PT to progress balance and strength.   PT Brief overview of current status bed mob SBA, STS with FWW SBA, gait with FWW and SB-CGA.   PT Equipment Needed at Discharge walker, rolling   Total Session Time   Timed Code Treatment Minutes 23   Total Session Time (sum of timed and untimed services) 33
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
## 2024-01-09 NOTE — PROGRESS NOTES
[facility]     Met with patient and daughter Meka at bedside to discuss plans for home care services at discharge. Discharge Date is 1/9/2024. Patient agrees to have [facility] provide HHA/PT/OT services.  Patient care support center processing referral.  Patient verbalized understanding that they will receive a phone call from [facility] to schedule initial home care visit. Anticipated start of care date is [within 24-48 hours of discharge]. Patient has 24 hour phone number for [facility] to call with any questions or concerns.    MADALYN Valles, LPN  Home Care Liaison   Cell: [phone]

## 2024-01-09 NOTE — PROGRESS NOTES
Care Management Discharge Note    Discharge Date: 01/09/2024     Discharge Disposition: Home, Home Care    Discharge Services: None    Discharge DME: None    Discharge Transportation: family or friend will provide    Patient/family educated on Medicare website which has current facility and service quality ratings: yes    Education Provided on the Discharge Plan: Yes  Persons Notified of Discharge Plans: Dtr   Patient/Family in Agreement with the Plan: yes    Additional Information:  Pt discharging home with dtr and HC PT/OT/HHA. [facility] HC accepted, AVS updated. Orders completed and sent.     Anne Pang RN BSN   Inpatient Care Coordination  [facility]   Phone [phone]

## 2024-01-09 NOTE — PLAN OF CARE
ROOM # 204-1    Living Situation (if not independent, order SW consult):  Facility name:  : [facility] [phone]     Activity level at baseline: Ind walker   Activity level on admit: SBA walker     Who will be transporting you at discharge: TBD    Patient registered to observation; given Patient Bill of Rights; given the opportunity to ask questions about observation status and their plan of care.  Patient has been oriented to the observation room, bathroom and call light is in place.    Discussed discharge goals and expectations with patient/family.

## 2024-01-09 NOTE — PLAN OF CARE
Goal Outcome Evaluation:      Plan of Care Reviewed With: patient    Overall Patient Progress: improvingOverall Patient Progress: improving    PRIMARY DIAGNOSIS: GENERALIZED WEAKNESS    OUTPATIENT/OBSERVATION GOALS TO BE MET BEFORE DISCHARGE  1. Orthostatic performed: No    2. Tolerating PO medications: Yes    3. Return to near baseline physical activity: Yes    4. Cleared for discharge by consultants (if involved): Yes    Discharge Planner Nurse   Safe discharge environment identified: Yes  Barriers to discharge: No       Entered by: Anne Madden RN 01/09/2024 12:30 PM  Please review provider order for any additional goals.   Nurse to notify provider when observation goals have been met and patient is ready for discharge.    A&O, forgetful. [place]. VSS on RA. Up with SBA-assist of 1, gait belt, and walker. Ambulated hallway x 1. C/o back/R hip pain, given PRN Tylenol with decrease in pain. Lidocaine patch removed. Regular diet, tolerating. LS clear, anteriorly. Voiding via BR. Discharging to home with daughter. Daughter to provide transportation. Discharge instructions discussed with patient and daughter. Personal belongings with patient.

## 2024-01-09 NOTE — PLAN OF CARE
PRIMARY DIAGNOSIS: GENERALIZED WEAKNESS/ Elevated Troponin    OUTPATIENT/OBSERVATION GOALS TO BE MET BEFORE DISCHARGE  1. Orthostatic performed: N/A    2. Tolerating PO medications: Yes    3. Return to near baseline physical activity: No    4. Cleared for discharge by consultants (if involved): No    Discharge Planner Nurse   Safe discharge environment identified: No  Barriers to discharge: Yes       Entered by: Krishan Chavez RN 01/09/2024 5:07 AM  Pt is A&O x4, Tolerating oral medications. Pt complained of pain at 8/10 in her right lower back/hip that was radiating down to her right leg. One time dose of IV dilaudid ordered and given prn tylenol. Evaluation with physical therapy today. Pt is resting in bed and able to make needs known. Will follow plan of care.   Please review provider order for any additional goals.   Nurse to notify provider when observation goals have been met and patient is ready for discharge.

## 2024-01-10 ENCOUNTER — TELEPHONE (OUTPATIENT)
Dept: FAMILY MEDICINE | Facility: CLINIC | Age: 89
End: 2024-01-10
Payer: MEDICARE

## 2024-01-10 LAB — BACTERIA UR CULT: NORMAL

## 2024-01-10 NOTE — PLAN OF CARE
Physical Therapy Discharge Summary    Reason for therapy discharge:    Discharged to home with home therapy. And assist from daughter.    Progress towards therapy goal(s). See goals on Care Plan in [facility] electronic health record for goal details.  Goals not met.  Barriers to achieving goals:   discharge from facility.  Stairs not attempted prior to discharge.  Therapy recommendation(s):    Continued therapy is recommended.  Rationale/Recommendations:   .  Per chart review and treating therapist's note, pt having minor LOB towards L side while using walker. Recommend pt use walker for all ambulation, have support from daughter for IADLs, and HH PT to progress balance and strength.   Goal Outcome Evaluation:

## 2024-01-12 ENCOUNTER — TELEPHONE (OUTPATIENT)
Dept: FAMILY MEDICINE | Facility: CLINIC | Age: 89
End: 2024-01-12
Payer: MEDICARE

## 2024-01-12 NOTE — TELEPHONE ENCOUNTER
OLIVER,    SOLEDAD Espinosa, [facility]  Reporting delay in start of care per patient request.  Patient's daughter was ill and she requested to start next week instead.  Rescheduled to start 1/13.    Lola RAGSDALE RN

## 2024-01-15 ENCOUNTER — TELEPHONE (OUTPATIENT)
Dept: FAMILY MEDICINE | Facility: CLINIC | Age: 89
End: 2024-01-15
Payer: MEDICARE

## 2024-01-15 NOTE — TELEPHONE ENCOUNTER
JW,      Patient's daughter called.  Needs to schedule an appointment for patient for hospital follow up.  Discharged from hospital 1/6.    Do you want to work her in this week?    Hellen Kennedy RN

## 2024-01-16 ENCOUNTER — TELEPHONE (OUTPATIENT)
Dept: FAMILY MEDICINE | Facility: CLINIC | Age: 89
End: 2024-01-16
Payer: MEDICARE

## 2024-01-16 ENCOUNTER — MEDICAL CORRESPONDENCE (OUTPATIENT)
Dept: HEALTH INFORMATION MANAGEMENT | Facility: CLINIC | Age: 89
End: 2024-01-16

## 2024-01-16 NOTE — TELEPHONE ENCOUNTER
JW,  Patient not able to come- has first PT visit this morning.  Assume we should schedule with someone else?    Lola RAGSDALE RN

## 2024-01-16 NOTE — TELEPHONE ENCOUNTER
OLIVER,  SOLEDAD- verbal ok given for PT, evaluation from OT and social work      Home Care is calling regarding an established patient with [facility].       Requesting orders from: Wegener, Joel Daniel Irwin  Provider is following patient: Yes  Is this a 60-day recertification request?  No    Orders Requested    Physical Therapy  Request for initial evaluation and treatment: five visits over 8 weeks      Occupational Therapy  Request for initial evaluation and treatment (one time)     Social Work  Request for initial evaluation and treatment (one time)       Verbal orders given.  Home Care will send orders for provider to sign.    Lola Medina RN

## 2024-01-17 ENCOUNTER — OFFICE VISIT (OUTPATIENT)
Dept: FAMILY MEDICINE | Facility: CLINIC | Age: 89
End: 2024-01-17
Payer: MEDICARE

## 2024-01-17 VITALS
SYSTOLIC BLOOD PRESSURE: 125 MMHG | HEIGHT: 59 IN | BODY MASS INDEX: 19.76 KG/M2 | OXYGEN SATURATION: 98 % | WEIGHT: 98 LBS | TEMPERATURE: 98.9 F | RESPIRATION RATE: 16 BRPM | DIASTOLIC BLOOD PRESSURE: 63 MMHG | HEART RATE: 92 BPM

## 2024-01-17 DIAGNOSIS — D50.9 IRON DEFICIENCY ANEMIA, UNSPECIFIED IRON DEFICIENCY ANEMIA TYPE: ICD-10-CM

## 2024-01-17 DIAGNOSIS — R54 FRAIL ELDERLY: ICD-10-CM

## 2024-01-17 DIAGNOSIS — M48.061 SPINAL STENOSIS OF LUMBAR REGION WITHOUT NEUROGENIC CLAUDICATION: ICD-10-CM

## 2024-01-17 DIAGNOSIS — R20.0 NUMBNESS AND TINGLING IN LEFT HAND: Primary | ICD-10-CM

## 2024-01-17 DIAGNOSIS — R20.2 NUMBNESS AND TINGLING IN LEFT HAND: Primary | ICD-10-CM

## 2024-01-17 DIAGNOSIS — M62.81 GENERALIZED MUSCLE WEAKNESS: ICD-10-CM

## 2024-01-17 DIAGNOSIS — R11.2 NAUSEA AND VOMITING, UNSPECIFIED VOMITING TYPE: ICD-10-CM

## 2024-01-17 DIAGNOSIS — D72.819 LEUKOPENIA, UNSPECIFIED TYPE: ICD-10-CM

## 2024-01-17 LAB
ERYTHROCYTE [DISTWIDTH] IN BLOOD BY AUTOMATED COUNT: 15 % (ref 10–15)
HCT VFR BLD AUTO: 31.4 % (ref 35–47)
HGB BLD-MCNC: 9.8 G/DL (ref 11.7–15.7)
MCH RBC QN AUTO: 26.3 PG (ref 26.5–33)
MCHC RBC AUTO-ENTMCNC: 31.2 G/DL (ref 31.5–36.5)
MCV RBC AUTO: 84 FL (ref 78–100)
PLATELET # BLD AUTO: 236 10E3/UL (ref 150–450)
RBC # BLD AUTO: 3.72 10E6/UL (ref 3.8–5.2)
WBC # BLD AUTO: 5.9 10E3/UL (ref 4–11)

## 2024-01-17 PROCEDURE — 85027 COMPLETE CBC AUTOMATED: CPT | Performed by: FAMILY MEDICINE

## 2024-01-17 PROCEDURE — 99213 OFFICE O/P EST LOW 20 MIN: CPT | Performed by: FAMILY MEDICINE

## 2024-01-17 PROCEDURE — 36415 COLL VENOUS BLD VENIPUNCTURE: CPT | Performed by: FAMILY MEDICINE

## 2024-01-17 ASSESSMENT — PAIN SCALES - GENERAL: PAINLEVEL: EXTREME PAIN (8)

## 2024-01-17 ASSESSMENT — PATIENT HEALTH QUESTIONNAIRE - PHQ9
SUM OF ALL RESPONSES TO PHQ QUESTIONS 1-9: 0
SUM OF ALL RESPONSES TO PHQ QUESTIONS 1-9: 0

## 2024-01-17 NOTE — PROGRESS NOTES
Assessment & Plan     Numbness and tingling in left hand  The cause of the numbness and tingling symptoms she is experiencing in the first 4 digits seems consistent with carpal tunnel syndrome.  I recommended she try using a wrist splint when sleeping to see if this helps.  If symptoms do not improve or if they become worse she will schedule an appointment with her PCP for further evaluation.    Iron deficiency anemia, unspecified iron deficiency anemia type  I recommended that we repeat a CBC today.  When she was in the hospital her hemoglobin was 9.6 and then dropped to 8.7 on the second day.  - CBC with platelets; Future  - CBC with platelets    Leukopenia, unspecified type  Her white blood cell count was low at 2.8 on 1/9/2024.  It is possible that she may have had an underlying viral illness that led to this.    Nausea and vomiting  She complained of some nausea and vomiting symptoms just prior to being seen in the emergency department.  Her symptoms were most likely viral.  It is reassuring that this has resolved.    Addendum: I certify that, based on my findings, the following services are medically necessary as ordered from her discharge team on 1/8/20234, Home Health Services: Physical Therapy   Home Safety Assessment  Therapeutic Exercise     Additional services needed: Occupational Therapy  Home Health Aide ADLs  Home Safety     Due to the diagnoses set of generalized muscle weakness (M62.81, frail elderly R54, numbness and tingling in left hand R20.0 And spinal stenosis of the lumbar region M48.061)     Further, I certify that my clinical findings support that this patient is homebound (i.e. absences from home require considerable and taxing effort and are for medical reasons or [employer] services or infrequently or of short duration when for other reasons) related to:Requires assistance of another person or specialized equipment is needed         MED REC REQUIRED  Post Medication Reconciliation  "Status: discharge medications reconciled, continue medications without change      Subjective   Keysha is a 89 year old, presenting for the following health issues:  Hospital F/U        1/17/2024     3:21 PM   Additional Questions   Roomed by John MORALES   Accompanied by Meka - Daughter     History of Present Illness       Reason for visit:  Follow up from hospital stay    She eats 2-3 servings of fruits and vegetables daily.She consumes 1 sweetened beverage(s) daily.She exercises with enough effort to increase her heart rate 9 or less minutes per day.  She exercises with enough effort to increase her heart rate 3 or less days per week.   She is taking medications regularly.         ED/UC Followup:    Facility:  [facility]   Date of visit: 1/8/2024 - 1/9/2024   Reason for visit: Numbness and tingling in left arm   Current Status: Improvement - L hand numbness, L arm has sensation      She was recently admitted overnight to the hospital after being evaluated on 1/8/2024 in the emergency department for nonspecific symptoms including feeling \"off\" and reporting GI discomfort, nausea and vomiting symptoms as well as numbness and tingling symptoms in her left arm.  As part of her workup she had an elevated troponin.  Her EKG findings were normal.  The rest of her lab results were unremarkable.  She had a urinalysis that did not appear infected.  They also checked an MRI and MRA of the brain and neck which were negative.  Her symptoms improved and she was discharged home the following day.  However, she still some lingering numbness and tingling symptoms that are now only in the left hand.  She reports that the first 4 digits seem to be affected, not the pinky.  She denies experiencing significant weakness.  She does not remember ever having issues with carpal tunnel syndrome.  The symptoms frequently bother her at night or first thing in the morning when she wakes up.  She has not had any swelling " "or temperature/color changes in this hand or arm.  She denies having neck pain symptoms.    She describes otherwise feeling back to her normal self/health.          Objective    /63   Pulse 92   Temp 98.9  F (37.2  C) (Temporal)   Resp 16   Ht 1.499 m (4' 11\")   Wt 44.5 kg (98 lb)   SpO2 98%   BMI 19.79 kg/m    Body mass index is 19.79 kg/m .    Review of Systems  Constitutional, HEENT, cardiovascular, pulmonary, GI, , musculoskeletal, neuro, skin, endocrine and psych systems are negative, except as otherwise noted.  Physical Exam   GENERAL: alert and no distress.  She appears frail.  EYES: Eyes grossly normal to inspection, PERRL and conjunctivae and sclerae normal  HENT: nose and mouth without ulcers or lesions  NECK: no adenopathy, no asymmetry, masses, or scars.  Nontender over the cervical and upper thoracic spine and paraspinal muscles.  RESP: lungs clear to auscultation - no rales, rhonchi or wheezes  CV: regular rate and rhythm, normal S1 S2, no S3 or S4, no murmur, click or rub, no peripheral edema  ABDOMEN: soft, nontender, no hepatosplenomegaly, no masses and bowel sounds normal  MS: She is generally weak on exam and needs assistance with ambulation.   strength is symmetric.  Muscle strength in bilateral upper extremities is slightly weak, but symmetric.  Radial and ulnar pulses 2 out of 4 bilaterally.  There is no swelling or temperature/color changes in her upper extremities.  Tinel's testing negative bilaterally.  Phalen's testing positive on the left.  SKIN: no suspicious lesions or rashes  NEURO: Normal strength and tone, mentation intact and speech normal.  Spurling's compression test negative bilaterally.  PSYCH: mentation appears normal, affect normal/bright              Signed Electronically by: Dat Leon,     "

## 2024-02-02 ENCOUNTER — TELEPHONE (OUTPATIENT)
Dept: FAMILY MEDICINE | Facility: CLINIC | Age: 89
End: 2024-02-02
Payer: MEDICARE

## 2024-02-02 NOTE — TELEPHONE ENCOUNTER
"Tiara HACKETT calling from [facility].  Working on clearing the patient for homecare.  Needs face to face requirement to be met.  Patient not able to see JW right now, which would be needed at this time for him to ok.    Wondering if you are willing to take over verbal orders until patient returns, as well as make modification to notes from visit?    Needs to specifically address why she has numbness and tingling that requires homecare.    Offered possible statement to include in addendum.    \"Patient requires home health services to treat numbness, tingling in left hand that is related to or secondary to spinal stenosis.\"    Also would need to add spinal stenosis to diagnosis list.  Please advise.    Can update Tiara at [phone]    Lola RAGSDALE RN   "

## 2024-02-05 NOTE — TELEPHONE ENCOUNTER
Please contact them letting them know that I addended the note so that she may receive home health care services.  Thank you, DE

## 2024-02-08 ENCOUNTER — TELEPHONE (OUTPATIENT)
Dept: FAMILY MEDICINE | Facility: CLINIC | Age: 89
End: 2024-02-08
Payer: MEDICARE

## 2024-02-08 NOTE — TELEPHONE ENCOUNTER
Home Care is calling regarding an established patient with [facility].       Requesting orders from: Wegener, Joel Daniel Irwin  Provider is following patient: Yes  Is this a 60-day recertification request?  No    Orders Requested    Occupational Therapy  Request for initial evaluation and treatment (one time)       Verbal orders given, provider out of office, covering provider used.  Home Care will send orders for covering provider to sign.  Confirmed ok to leave a detailed message with call back.  Contact information confirmed and updated as needed.    [facility] - OT ([phone])    Hellen Kennedy, HALLEY Kennedy, RN

## 2024-02-09 ENCOUNTER — TELEPHONE (OUTPATIENT)
Dept: FAMILY MEDICINE | Facility: CLINIC | Age: 89
End: 2024-02-09
Payer: MEDICARE

## 2024-02-09 ENCOUNTER — MEDICAL CORRESPONDENCE (OUTPATIENT)
Dept: HEALTH INFORMATION MANAGEMENT | Facility: CLINIC | Age: 89
End: 2024-02-09
Payer: MEDICARE

## 2024-02-09 NOTE — TELEPHONE ENCOUNTER
Forms/Letter Request    Type of form/letter: OTHER: add on discipline  Order # [id number]  Order date: 2/8/24  OT to eval patient for OT services and develop plan of care to be signed by the physician.       Do we have the form/letter: Yes: w/ DE (name is on order)    Who is the form from? [facility] (if other please explain)    Where did/will the form come from? form was faxed in    When is form/letter needed by: asap    How would you like the form/letter returned: Fax : [phone]

## 2024-02-12 ENCOUNTER — TELEPHONE (OUTPATIENT)
Dept: FAMILY MEDICINE | Facility: CLINIC | Age: 89
End: 2024-02-12
Payer: MEDICARE

## 2024-02-12 DIAGNOSIS — R41.3 MEMORY LOSS: Primary | ICD-10-CM

## 2024-02-12 NOTE — TELEPHONE ENCOUNTER
General Call      Reason for Call:   pt daughter has questions regarding getting memory care referral asap       What are your questions or concerns:        Date of last appointment with provider: 3/17/2023    Okay to leave a detailed message?: Yes at Home number on file  [phone] home) .

## 2024-02-12 NOTE — TELEPHONE ENCOUNTER
SN,        Spoke with patient's daughter Meka.  States they need referral for patient to go into a memory care facility.  Does not need to see specialist.    Do you want this to wait for JW to address?    Hellen Kennedy RN

## 2024-02-12 NOTE — TELEPHONE ENCOUNTER
Please let them know I placed referral  If there is a long wait through [facility] they can try [facility] of neurology or tash    Thank you  SN

## 2024-02-14 ENCOUNTER — TELEPHONE (OUTPATIENT)
Dept: FAMILY MEDICINE | Facility: CLINIC | Age: 89
End: 2024-02-14
Payer: MEDICARE

## 2024-02-14 NOTE — TELEPHONE ENCOUNTER
Order/Referral Request    Who is requesting: [facility]    Orders being requested: Health Cert/plan of care Order # [id number]    Reason service is needed/diagnosis:     When are orders needed by:     Has this been discussed with Provider: No    Does patient have a preference on a Group/Provider/Facility?     Does patient have an appointment scheduled?: Yes: 5/2024    Where to send orders: Fax    Okay to leave a detailed message?: No at Other phone number:  Fax [phone]

## 2024-02-15 DIAGNOSIS — Z53.9 DIAGNOSIS NOT YET DEFINED: Primary | ICD-10-CM

## 2024-02-15 PROCEDURE — G0180 MD CERTIFICATION HHA PATIENT: HCPCS | Performed by: FAMILY MEDICINE

## 2024-02-16 ENCOUNTER — MEDICAL CORRESPONDENCE (OUTPATIENT)
Dept: FAMILY MEDICINE | Facility: CLINIC | Age: 89
End: 2024-02-16

## 2024-02-16 ENCOUNTER — OFFICE VISIT (OUTPATIENT)
Dept: FAMILY MEDICINE | Facility: CLINIC | Age: 89
End: 2024-02-16
Payer: MEDICARE

## 2024-02-16 DIAGNOSIS — Z66 DNR (DO NOT RESUSCITATE): ICD-10-CM

## 2024-02-16 DIAGNOSIS — G30.9 SEVERE ALZHEIMER'S DEMENTIA WITHOUT BEHAVIORAL DISTURBANCE, PSYCHOTIC DISTURBANCE, MOOD DISTURBANCE, OR ANXIETY, UNSPECIFIED TIMING OF DEMENTIA ONSET (H): Primary | ICD-10-CM

## 2024-02-16 DIAGNOSIS — F02.C0 SEVERE ALZHEIMER'S DEMENTIA WITHOUT BEHAVIORAL DISTURBANCE, PSYCHOTIC DISTURBANCE, MOOD DISTURBANCE, OR ANXIETY, UNSPECIFIED TIMING OF DEMENTIA ONSET (H): Primary | ICD-10-CM

## 2024-02-16 PROCEDURE — 99212 OFFICE O/P EST SF 10 MIN: CPT | Performed by: FAMILY MEDICINE

## 2024-02-19 ENCOUNTER — TELEPHONE (OUTPATIENT)
Dept: FAMILY MEDICINE | Facility: CLINIC | Age: 89
End: 2024-02-19
Payer: MEDICARE

## 2024-02-19 ENCOUNTER — DOCUMENTATION ONLY (OUTPATIENT)
Dept: OTHER | Facility: CLINIC | Age: 89
End: 2024-02-19
Payer: MEDICARE

## 2024-02-19 DIAGNOSIS — Z53.9 DIAGNOSIS NOT YET DEFINED: Primary | ICD-10-CM

## 2024-02-19 PROCEDURE — 99207 PR MD CERTIFICATION HHA PATIENT: CPT | Performed by: FAMILY MEDICINE

## 2024-02-19 PROCEDURE — G0180 MD CERTIFICATION HHA PATIENT: HCPCS | Performed by: FAMILY MEDICINE

## 2024-02-19 NOTE — TELEPHONE ENCOUNTER
Forms/Letter Request    Type of form/letter: Home Health Certification  and Plan of Care  1/16/24--3/15/24  Needs Dr Signature and Fax Back to [facility] [phone]  and send for scanning when done  Placed in [facility] Box to sign      Do we have the form/letter: Yes: Yes    Who is the form from?  [facility] care    Where did/will the form come from? form was faxed in    When is form/letter needed by: 5-7 Business Days    How would you like the form/letter returned: Fax : [phone]    Patient Notified form requests are processed in 5-7 business days:No    Okay to leave a detailed message?: No at Other phone number:  N/A

## 2024-02-20 NOTE — TELEPHONE ENCOUNTER
Forms faxed to [facility] fax # [phone] and copy sent to stat scan.     Cassidy SHEPHERD

## 2024-02-27 ENCOUNTER — MEDICAL CORRESPONDENCE (OUTPATIENT)
Dept: HEALTH INFORMATION MANAGEMENT | Facility: CLINIC | Age: 89
End: 2024-02-27

## 2024-03-01 ENCOUNTER — TELEPHONE (OUTPATIENT)
Dept: FAMILY MEDICINE | Facility: CLINIC | Age: 89
End: 2024-03-01

## 2024-03-01 NOTE — TELEPHONE ENCOUNTER
[facility] called.   States patient is declining a visit this week.   They will resume visits next week.     Cassidy SHEPHERD

## 2024-03-01 NOTE — PROGRESS NOTES
Assessment & Plan     Severe Alzheimer's dementia without behavioral disturbance, psychotic disturbance, mood disturbance, or anxiety, unspecified timing of dementia onset (H):    Daughter here for visit today to see me and requested memory care admission forms to be done for Kymberly.  Kymberly voiced understanding that moving to memory care.      Did also participate in code status discussion and agree that if was significantly ill would not want life support cpr, etc.       Admission Forms filled out and updated immunizations, diet, medications in anticipation of moving to memory care.     Updated POLST form to signify DNR/DNI consistent with discussion with her and daughter Meka Srivastava today.     Discussed if admitted to [facility] would the likely be under the care of [facility] team.     See scanned copies of admission orders today and POLST form.       45 minutes spent by me on the date of the encounter doing chart review, history and exam, documentation and further activities per the note            Subjective   Kymberly is a 89 year old, presenting for the following health issues:  No chief complaint on file.    HPI                   Objective    There were no vitals taken for this visit.  There is no height or weight on file to calculate BMI.  Physical Exam   Slow speech but does participate.  Most history via daughter.             Signed Electronically by: Joel Daniel Wegener, MD

## 2024-03-04 ENCOUNTER — TELEPHONE (OUTPATIENT)
Dept: FAMILY MEDICINE | Facility: CLINIC | Age: 89
End: 2024-03-04
Payer: MEDICARE

## 2024-03-04 DIAGNOSIS — H35.3233 EXUDATIVE AGE-RELATED MACULAR DEGENERATION OF BOTH EYES WITH INACTIVE SCAR (H): Primary | ICD-10-CM

## 2024-03-04 RX ORDER — ANTIOX #8/OM3/DHA/EPA/LUT/ZEAX 250-2.5 MG
2 CAPSULE ORAL DAILY
Qty: 180 CAPSULE | Refills: 3 | Status: SHIPPED | OUTPATIENT
Start: 2024-03-04 | End: 2024-05-17

## 2024-03-04 NOTE — TELEPHONE ENCOUNTER
Marie BOYD from [facility] called.  Need Rx [facility] Preservision  Listed as historical     Need directions/capsules or tablets.    Maire ([phone])    Thank you,  Hellen Kennedy RN

## 2024-03-05 ENCOUNTER — MEDICAL CORRESPONDENCE (OUTPATIENT)
Dept: HEALTH INFORMATION MANAGEMENT | Facility: CLINIC | Age: 89
End: 2024-03-05
Payer: MEDICARE

## 2024-03-14 ENCOUNTER — TELEPHONE (OUTPATIENT)
Dept: FAMILY MEDICINE | Facility: CLINIC | Age: 89
End: 2024-03-14
Payer: MEDICARE

## 2024-03-14 DIAGNOSIS — M47.816 SPONDYLOSIS OF LUMBAR REGION WITHOUT MYELOPATHY OR RADICULOPATHY: Primary | ICD-10-CM

## 2024-03-14 RX ORDER — ACETAMINOPHEN 325 MG/1
325 TABLET ORAL DAILY
Qty: 90 TABLET | Refills: 3 | Status: SHIPPED | OUTPATIENT
Start: 2024-03-14 | End: 2024-03-15

## 2024-03-14 NOTE — TELEPHONE ENCOUNTER
Ce calling from [facility] to request scheduled tylenol dose in addition to PRN     New: Tylenol - 325MG 1 tablet once daily in the morning     Current PRN: tylenol 325mg 1 tablet 3 times per day as needed     Order pended   Thank you  Taryn KRISHNA RN  [facility]

## 2024-03-14 NOTE — TELEPHONE ENCOUNTER
Francisca FELIPE, PT with [facility] calling  Discharged pt from home care  All goals met    Depression questionnaires completed at discharge  PHQ-9: 3  Patient reports having difficulty/sadness with moving to [place]  Is on a memory care unit and appears to be the highest functioning - other residents don't talk at meals, or engage with activities  Doesn't feel she has people she can talk with during the day  Unsure if mental health/therapist referral may be available?   Francisca with [facility] also notified pt's daughter Meka  Pended referral if approved    Thanks,  Jazz PADILLA RN

## 2024-03-15 ENCOUNTER — MEDICAL CORRESPONDENCE (OUTPATIENT)
Dept: HEALTH INFORMATION MANAGEMENT | Facility: CLINIC | Age: 89
End: 2024-03-15
Payer: MEDICARE

## 2024-03-15 RX ORDER — ACETAMINOPHEN 325 MG/1
650 TABLET ORAL EVERY 6 HOURS PRN
COMMUNITY
Start: 2024-04-18 | End: 2024-05-02

## 2024-03-15 RX ORDER — DIPHENHYDRAMINE HCL 25 MG
25 TABLET ORAL DAILY PRN
COMMUNITY
Start: 2024-03-15 | End: 2024-04-08

## 2024-03-15 RX ORDER — IBUPROFEN 200 MG
200 TABLET ORAL DAILY PRN
COMMUNITY
Start: 2024-03-15 | End: 2024-04-08

## 2024-03-15 NOTE — TELEPHONE ENCOUNTER
[facility] faxed over for Ibuprofen 200 mg and Diphenhydramine 25mg  Gave to JW To sign and will Fax back to Thai [phone]  [facility] Coordinator

## 2024-03-15 NOTE — TELEPHONE ENCOUNTER
OLIVER  RN called back from facility.  Need a few PRNs added as well:  Ibuprofen 200 mg daily PRN for pain  Benadryl 25 mg daily PRN for allergies/hayfever.    Lola RAGSDALE RN

## 2024-03-19 ENCOUNTER — MEDICAL CORRESPONDENCE (OUTPATIENT)
Dept: HEALTH INFORMATION MANAGEMENT | Facility: CLINIC | Age: 89
End: 2024-03-19
Payer: MEDICARE

## 2024-04-04 ENCOUNTER — TELEPHONE (OUTPATIENT)
Dept: FAMILY MEDICINE | Facility: CLINIC | Age: 89
End: 2024-04-04
Payer: MEDICARE

## 2024-04-04 NOTE — TELEPHONE ENCOUNTER
Medication list is now updated with Thai Andre will also confirm tylenol prescription with their pharmacy  Thanks,  Jazz PADILLA RN

## 2024-04-04 NOTE — TELEPHONE ENCOUNTER
Please call in tylenol order (acetaminophen) to hi as follows:     Tylenol (acetaminophen) 325mg daily in morning and 325mg three times daily in addition as needed.     2.  Update med list as needed please.     Joel Wegener,MD w

## 2024-04-04 NOTE — TELEPHONE ENCOUNTER
JW,    Please advise.    [facility] received notification from pharmacy dated 4/1 saying tylenol order was cancelled.    Would like verification that tylenol prescription is still ok.  Tylenol order faxed to facility 3/18, see 3/14 Medication Request order      Callback [phone]    Thanks,  Kenneth RAGSDALE RN

## 2024-04-08 ENCOUNTER — APPOINTMENT (OUTPATIENT)
Dept: MRI IMAGING | Facility: CLINIC | Age: 89
DRG: 065 | End: 2024-04-08
Attending: EMERGENCY MEDICINE
Payer: MEDICARE

## 2024-04-08 ENCOUNTER — NURSE TRIAGE (OUTPATIENT)
Dept: FAMILY MEDICINE | Facility: CLINIC | Age: 89
End: 2024-04-08
Payer: MEDICARE

## 2024-04-08 ENCOUNTER — APPOINTMENT (OUTPATIENT)
Dept: MRI IMAGING | Facility: CLINIC | Age: 89
DRG: 065 | End: 2024-04-08
Attending: PHYSICIAN ASSISTANT
Payer: MEDICARE

## 2024-04-08 ENCOUNTER — HOSPITAL ENCOUNTER (INPATIENT)
Facility: CLINIC | Age: 89
LOS: 3 days | Discharge: NURSING, REHAB OR LONG-TERM CARE | DRG: 065 | End: 2024-04-11
Attending: EMERGENCY MEDICINE | Admitting: INTERNAL MEDICINE
Payer: MEDICARE

## 2024-04-08 DIAGNOSIS — R26.2 DIFFICULTY WALKING: ICD-10-CM

## 2024-04-08 DIAGNOSIS — R42 DIZZINESS: ICD-10-CM

## 2024-04-08 DIAGNOSIS — H53.2 DOUBLE VISION: ICD-10-CM

## 2024-04-08 LAB
ANION GAP SERPL CALCULATED.3IONS-SCNC: 10 MMOL/L (ref 7–15)
BASOPHILS # BLD AUTO: 0 10E3/UL (ref 0–0.2)
BASOPHILS NFR BLD AUTO: 1 %
BUN SERPL-MCNC: 18.4 MG/DL (ref 8–23)
CALCIUM SERPL-MCNC: 9.9 MG/DL (ref 8.2–9.6)
CHLORIDE SERPL-SCNC: 104 MMOL/L (ref 98–107)
CREAT SERPL-MCNC: 0.87 MG/DL (ref 0.51–0.95)
DEPRECATED HCO3 PLAS-SCNC: 23 MMOL/L (ref 22–29)
EGFRCR SERPLBLD CKD-EPI 2021: 61 ML/MIN/1.73M2
EOSINOPHIL # BLD AUTO: 0.1 10E3/UL (ref 0–0.7)
EOSINOPHIL NFR BLD AUTO: 1 %
ERYTHROCYTE [DISTWIDTH] IN BLOOD BY AUTOMATED COUNT: 14.2 % (ref 10–15)
GLUCOSE BLDC GLUCOMTR-MCNC: 103 MG/DL (ref 70–99)
GLUCOSE BLDC GLUCOMTR-MCNC: 116 MG/DL (ref 70–99)
GLUCOSE SERPL-MCNC: 86 MG/DL (ref 70–99)
HBA1C MFR BLD: 5.5 %
HCT VFR BLD AUTO: 30.9 % (ref 35–47)
HGB BLD-MCNC: 9.5 G/DL (ref 11.7–15.7)
IMM GRANULOCYTES # BLD: 0 10E3/UL
IMM GRANULOCYTES NFR BLD: 0 %
LYMPHOCYTES # BLD AUTO: 1 10E3/UL (ref 0.8–5.3)
LYMPHOCYTES NFR BLD AUTO: 22 %
MCH RBC QN AUTO: 24.7 PG (ref 26.5–33)
MCHC RBC AUTO-ENTMCNC: 30.7 G/DL (ref 31.5–36.5)
MCV RBC AUTO: 80 FL (ref 78–100)
MONOCYTES # BLD AUTO: 0.5 10E3/UL (ref 0–1.3)
MONOCYTES NFR BLD AUTO: 12 %
NEUTROPHILS # BLD AUTO: 2.7 10E3/UL (ref 1.6–8.3)
NEUTROPHILS NFR BLD AUTO: 64 %
NRBC # BLD AUTO: 0 10E3/UL
NRBC BLD AUTO-RTO: 0 /100
PLATELET # BLD AUTO: 249 10E3/UL (ref 150–450)
POTASSIUM SERPL-SCNC: 4.9 MMOL/L (ref 3.4–5.3)
RBC # BLD AUTO: 3.85 10E6/UL (ref 3.8–5.2)
SODIUM SERPL-SCNC: 137 MMOL/L (ref 135–145)
TROPONIN T SERPL HS-MCNC: 16 NG/L
TROPONIN T SERPL HS-MCNC: 16 NG/L
WBC # BLD AUTO: 4.3 10E3/UL (ref 4–11)

## 2024-04-08 PROCEDURE — 120N000001 HC R&B MED SURG/OB

## 2024-04-08 PROCEDURE — 82962 GLUCOSE BLOOD TEST: CPT

## 2024-04-08 PROCEDURE — 85004 AUTOMATED DIFF WBC COUNT: CPT | Performed by: EMERGENCY MEDICINE

## 2024-04-08 PROCEDURE — 36415 COLL VENOUS BLD VENIPUNCTURE: CPT | Performed by: EMERGENCY MEDICINE

## 2024-04-08 PROCEDURE — 255N000002 HC RX 255 OP 636: Performed by: EMERGENCY MEDICINE

## 2024-04-08 PROCEDURE — 36415 COLL VENOUS BLD VENIPUNCTURE: CPT | Performed by: PHYSICIAN ASSISTANT

## 2024-04-08 PROCEDURE — 250N000013 HC RX MED GY IP 250 OP 250 PS 637: Performed by: PHYSICIAN ASSISTANT

## 2024-04-08 PROCEDURE — A9585 GADOBUTROL INJECTION: HCPCS | Performed by: EMERGENCY MEDICINE

## 2024-04-08 PROCEDURE — 70551 MRI BRAIN STEM W/O DYE: CPT | Mod: MG

## 2024-04-08 PROCEDURE — 83036 HEMOGLOBIN GLYCOSYLATED A1C: CPT | Performed by: PHYSICIAN ASSISTANT

## 2024-04-08 PROCEDURE — 84484 ASSAY OF TROPONIN QUANT: CPT | Performed by: PHYSICIAN ASSISTANT

## 2024-04-08 PROCEDURE — 99448 NTRPROF PH1/NTRNET/EHR 21-30: CPT | Performed by: PSYCHIATRY & NEUROLOGY

## 2024-04-08 PROCEDURE — 250N000013 HC RX MED GY IP 250 OP 250 PS 637: Performed by: EMERGENCY MEDICINE

## 2024-04-08 PROCEDURE — 82465 ASSAY BLD/SERUM CHOLESTEROL: CPT | Performed by: PHYSICIAN ASSISTANT

## 2024-04-08 PROCEDURE — 93005 ELECTROCARDIOGRAM TRACING: CPT

## 2024-04-08 PROCEDURE — 70544 MR ANGIOGRAPHY HEAD W/O DYE: CPT | Mod: MA

## 2024-04-08 PROCEDURE — 99222 1ST HOSP IP/OBS MODERATE 55: CPT | Mod: AI | Performed by: PHYSICIAN ASSISTANT

## 2024-04-08 PROCEDURE — 70553 MRI BRAIN STEM W/O & W/DYE: CPT | Mod: MA

## 2024-04-08 PROCEDURE — 99285 EMERGENCY DEPT VISIT HI MDM: CPT | Mod: 25

## 2024-04-08 PROCEDURE — 84484 ASSAY OF TROPONIN QUANT: CPT | Performed by: EMERGENCY MEDICINE

## 2024-04-08 PROCEDURE — 70549 MR ANGIOGRAPH NECK W/O&W/DYE: CPT | Mod: MA

## 2024-04-08 PROCEDURE — 80048 BASIC METABOLIC PNL TOTAL CA: CPT | Performed by: EMERGENCY MEDICINE

## 2024-04-08 RX ORDER — ONDANSETRON 4 MG/1
4 TABLET, ORALLY DISINTEGRATING ORAL EVERY 6 HOURS PRN
Status: DISCONTINUED | OUTPATIENT
Start: 2024-04-08 | End: 2024-04-11 | Stop reason: HOSPADM

## 2024-04-08 RX ORDER — GADOBUTROL 604.72 MG/ML
10 INJECTION INTRAVENOUS ONCE
Status: COMPLETED | OUTPATIENT
Start: 2024-04-08 | End: 2024-04-08

## 2024-04-08 RX ORDER — LIDOCAINE 40 MG/G
CREAM TOPICAL
Status: DISCONTINUED | OUTPATIENT
Start: 2024-04-08 | End: 2024-04-11 | Stop reason: HOSPADM

## 2024-04-08 RX ORDER — ASPIRIN 325 MG
325 TABLET ORAL ONCE
Status: COMPLETED | OUTPATIENT
Start: 2024-04-08 | End: 2024-04-08

## 2024-04-08 RX ORDER — ONDANSETRON 2 MG/ML
4 INJECTION INTRAMUSCULAR; INTRAVENOUS EVERY 6 HOURS PRN
Status: DISCONTINUED | OUTPATIENT
Start: 2024-04-08 | End: 2024-04-11 | Stop reason: HOSPADM

## 2024-04-08 RX ORDER — DIPHENHYDRAMINE HCL 25 MG
25 CAPSULE ORAL DAILY PRN
COMMUNITY
End: 2024-04-23

## 2024-04-08 RX ORDER — ACETAMINOPHEN 325 MG/1
650 TABLET ORAL DAILY
COMMUNITY
Start: 2024-04-19 | End: 2024-05-17

## 2024-04-08 RX ORDER — IBUPROFEN 200 MG
200 TABLET ORAL DAILY PRN
Status: ON HOLD | COMMUNITY
End: 2024-04-11

## 2024-04-08 RX ORDER — ACETAMINOPHEN 325 MG/1
650 TABLET ORAL EVERY 4 HOURS PRN
Status: DISCONTINUED | OUTPATIENT
Start: 2024-04-08 | End: 2024-04-11 | Stop reason: HOSPADM

## 2024-04-08 RX ADMIN — ACETAMINOPHEN 650 MG: 325 TABLET, FILM COATED ORAL at 21:30

## 2024-04-08 RX ADMIN — GADOBUTROL 10 ML: 604.72 INJECTION INTRAVENOUS at 12:44

## 2024-04-08 RX ADMIN — ASPIRIN 325 MG ORAL TABLET 325 MG: 325 PILL ORAL at 15:18

## 2024-04-08 ASSESSMENT — COLUMBIA-SUICIDE SEVERITY RATING SCALE - C-SSRS
1. IN THE PAST MONTH, HAVE YOU WISHED YOU WERE DEAD OR WISHED YOU COULD GO TO SLEEP AND NOT WAKE UP?: NO
2. HAVE YOU ACTUALLY HAD ANY THOUGHTS OF KILLING YOURSELF IN THE PAST MONTH?: NO
6. HAVE YOU EVER DONE ANYTHING, STARTED TO DO ANYTHING, OR PREPARED TO DO ANYTHING TO END YOUR LIFE?: NO

## 2024-04-08 ASSESSMENT — ACTIVITIES OF DAILY LIVING (ADL)
ADLS_ACUITY_SCORE: 30
ADLS_ACUITY_SCORE: 38
ADLS_ACUITY_SCORE: 30
ADLS_ACUITY_SCORE: 38
ADLS_ACUITY_SCORE: 41
ADLS_ACUITY_SCORE: 38

## 2024-04-08 NOTE — ED NOTES
[facility]  ED Nurse Handoff Report    ED Chief complaint: Generalized Weakness and Fall  . ED Diagnosis:   Final diagnoses:   Dizziness   Double vision       Allergies:   Allergies   Allergen Reactions    Dimethicone-Zinc Oxide Anaphylaxis    Seasonal Allergies     Penicillins Swelling, Itching and Rash     Other reaction(s): Edema         Code Status: DNR / DNI    Activity level - Baseline/Home:  independent   Activity Level - Current:   assist of 2.   Lift room needed: No.   Bariatric: No   Needed: No   Isolation: No.   Infection: Not Applicable.     Respiratory status: Room air    Vital Signs (within 30 minutes):   Vitals:    04/08/24 1215 04/08/24 1230 04/08/24 1405 04/08/24 1510   BP: 125/67 124/66 135/85 (!) 159/79   Pulse: 78 71 90 84   Resp:       Temp:       TempSrc:       SpO2: 97% 98% 98% 99%   Weight:       Height:           Cardiac Rhythm:  ,      Pain level:    Patient confused:  oriented to person and place per baseline .   Patient Falls Risk: nonskid shoes/slippers when out of bed, patient and family education, and activity supervised.   Elimination Status: Has voided     Patient Report - Initial Complaint: Weakness, fall  Focused Assessment:  Keysha Anders is a 89 year old female with history of stroke who presents to the ED for evaluation of generalized weakness and a fall. Daughter reports that nursing home staff visited the patient this morning at 0630 and the patient stated she fell in the middle of the night while going use the restroom. She was in bed when staff checked on her this morning. Patient states she put one foot on the floor and fell when she put the other foot down. Unsure whether the patient hit her head or lost consciousness. She denies symptoms prior to or during the fall. She went to bed around 2200 last night. Patient currently endorses double vision, dizziness, and left arm tingling. She is not ambulatory, which is not her baseline. Patient  "denies chest pain, shortness of breath, abdominal pain, headache, neck pain, or extremity pain.      Daughter states the patient complained of double vision yesterday morning, stating \"my eyes aren't working\" at the end of [employer] around 1130. Daughter also noticed patient was struggling to read the screens at [employer]. Patient was ambulatory at that time.     Abnormal Results:   Labs Ordered and Resulted from Time of ED Arrival to Time of ED Departure   BASIC METABOLIC PANEL - Abnormal       Result Value    Sodium 137      Potassium 4.9      Chloride 104      Carbon Dioxide (CO2) 23      Anion Gap 10      Urea Nitrogen 18.4      Creatinine 0.87      GFR Estimate 61      Calcium 9.9 (*)     Glucose 86     TROPONIN T, HIGH SENSITIVITY - Abnormal    Troponin T, High Sensitivity 16 (*)    CBC WITH PLATELETS AND DIFFERENTIAL - Abnormal    WBC Count 4.3      RBC Count 3.85      Hemoglobin 9.5 (*)     Hematocrit 30.9 (*)     MCV 80      MCH 24.7 (*)     MCHC 30.7 (*)     RDW 14.2      Platelet Count 249      % Neutrophils 64      % Lymphocytes 22      % Monocytes 12      % Eosinophils 1      % Basophils 1      % Immature Granulocytes 0      NRBCs per 100 WBC 0      Absolute Neutrophils 2.7      Absolute Lymphocytes 1.0      Absolute Monocytes 0.5      Absolute Eosinophils 0.1      Absolute Basophils 0.0      Absolute Immature Granulocytes 0.0      Absolute NRBCs 0.0          MRA Angiogram Neck w/o & w Contrast   Final Result   Impression:   1. No evidence of acute infarction or intracranial hemorrhage.   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    2. No abnormal enhancing lesions intracranially.   3. Head MRA demonstrates no definite aneurysm or stenosis of the major   intracranial arteries.   4. Neck MRA demonstrates patent major cervical arteries.             PABLO NEGRON MD            SYSTEM ID:  [id number]      MR Brain w/o & w Contrast   Final Result   Impression:   1. No evidence of acute infarction " or intracranial hemorrhage.   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    2. No abnormal enhancing lesions intracranially.   3. Head MRA demonstrates no definite aneurysm or stenosis of the major   intracranial arteries.   4. Neck MRA demonstrates patent major cervical arteries.             PABLO NEGRON MD            SYSTEM ID:  [id number]      MRA Angiogram Head w/o Contrast   Final Result   Impression:   1. No evidence of acute infarction or intracranial hemorrhage.   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    2. No abnormal enhancing lesions intracranially.   3. Head MRA demonstrates no definite aneurysm or stenosis of the major   intracranial arteries.   4. Neck MRA demonstrates patent major cervical arteries.             PABLO NEGRON MD            SYSTEM ID:  [id number]          Treatments provided: labs, MRI, aspirin   Family Comments: pt daughters at bedside  OBS brochure/video discussed/provided to patient:  Yes  ED Medications:   Medications   gadobutrol (GADAVIST) injection 10 mL (10 mLs Intravenous $Given 4/8/24 1244)   sodium chloride (PF) 0.9% PF flush 60 mL (100 mLs Intravenous $Given 4/8/24 1244)   aspirin (ASA) tablet 325 mg (325 mg Oral $Given 4/8/24 1518)       Drips infusing:  No  For the majority of the shift this patient was Green.   Interventions performed were n/a    Sepsis treatment initiated: No    Cares/treatment/interventions/medications to be completed following ED care: per hospitalist     ED Nurse Name: Francine Bolton RN  3:26 PM     RECEIVING UNIT ED HANDOFF REVIEW    Above ED Nurse Handoff Report was reviewed: Yes  Reviewed by: Maria M Soto RN on April 8, 2024 at 8:47 PM

## 2024-04-08 NOTE — ED TRIAGE NOTES
Patient came in after a fall overnight. When patient woke up this morning, patient was seeing double, hypertensive and unsteady. Left arm is tingly.      Triage Assessment (Adult)       Row Name 04/08/24 1108          Triage Assessment    Airway WDL WDL        Respiratory WDL    Respiratory WDL WDL        Skin Circulation/Temperature WDL    Skin Circulation/Temperature WDL WDL        Cardiac WDL    Cardiac WDL WDL        Peripheral/Neurovascular WDL    Peripheral Neurovascular WDL WDL

## 2024-04-08 NOTE — H&P
[facility]  Internal Medicine  History and Physical      Patient Name: Keysha Anders MRN# [id number]   Age: 89 year old YOB: 1929     Date of Admission:4/8/2024    Primary care provider: Wegener, Joel Daniel Irwin  Date of Service: 4/8/2024         Assessment and Plan:   Keysha Anders is a 89 year old female with a history of Anxiety, Cataract, Spinal Stenosis, Hyperparathyroidism, KISHA, Macular Degeneration, Thyroid Nodule, Osteoporosis, HTN, RLS, Alzheimer's Dementia who presented to the ED today after a fall overnight. When patient woke up this morning, patient was seeing double, hypertensive and unsteady. Left arm is tingly.      Pt presents from her [facility] facility with an unwitnessed fall overnight and two days of double vision.  She reports left arm tingling, but has also reported this in the past and wears a LUE brace for suspected carpal tunnel.  She has a history of macular degeneration and cataracts.  Per ED report, was noted to have left sided ataxia with difficulty walking on admission prompting stoke work up.    Left-sided numbness, Left-sided ataxia and Diplopia  Hemodynamically stable.  Troponin 16.  EKG with no acute ischemic changes.  MRI/MRA was negative for acute pathology.  No focal weakness on my exam.  Continues to report double vision and left forearm tingling.  - Stroke Neurology consulted and suspect MRI-negative stroke and recommend to start ASA 325mg daily and repeat Brain MRI with coronal DWI  - serial troponin, hgb A1C, telemetry monitoring, check lipid panel and echocardiogram  - PT/OT/SW consults       Alzheimer's Dementia  Anxiety  Lives at [facility].    - continue Sertraline    Chronic Anemia  Hgb 9.5 is at baseline    CODE: DNR/DNI  Diet/IVF: regular  DVT ppx: scd    Lavern Godinez MS PA-C  Physician Assistant   Hospitalist Service           Chief Complaint:   Generalized Weakness and Fall          HPI:   89 year old female with a  history of Anxiety, Cataract, Spinal Stenosis, Hyperparathyroidism, KISHA, Macular Degeneration, Thyroid Nodule, Osteoporosis, HTN, RLS, Alzheimer's Dementia who presented to the ED today after a fall overnight. When patient woke up this morning, patient was seeing double, hypertensive and unsteady. Left arm is tingly.      Patient has baseline dementia and is a poor historian.  History obtained from patient, ED staff and daughter at the bedside.  Patient lives in [facility].  Daughter reports the nursing staff at her facility visited the patient this morning and patient had reported an unwitnessed fall overnight.  Patient has reported ongoing double vision today and per report patient had complained of this yesterday as well.  Patient reports left arm tingling and daughter reports she wears a brace as it has been thought her left forearm tingling she has had in the past may be due to carpal tunnel.  It is reported she had difficulty with ambulation earlier today.         Past Medical History:   Anxiety    BMI 24.0-24.9, adult 1/15/2020    Bronchopulmonary dysplasia    Cataract    Central stenosis of spinal canal 3/29/2020    Chronic back pain    Chronic right-sided low back pain without sciatica 4/15/2021    Encephalitis   when young    Environmental allergies 1/13/2020    Exudative age-related macular degeneration of both eyes with inactive scar (HCC) 10/20/2021    Gait disturbance 5/12/2020    Health care directive on file 03/27/2020    [place] (hard of hearing)    Hyperparathyroidism (HCC)    KISHA (iron deficiency anemia)    Impaired vision    Iron deficiency anemia, unspecified 5/18/2007    Long term (current) use of opiate analgesic 02/16/2022   Treatment Agreement    Macular degeneration    Multiple thyroid nodules 2/27/2015    NSAID induced gastritis    Osteopenia    Osteoporosis    Other chronic pain 4/23/2020    Pseudophakia 10/20/2021    Recurrent pneumonia    Renovascular hypertension    Restless  legs syndrome (RLS) 2007    Sensorineural hearing loss (SNHL) of left ear with unrestricted hearing of right ear 2020    Spondylosis of lumbar spine 3/29/2020    Thyroid nodule    Vitamin D deficiency        Past Surgical History:   EGD BIOPSY SINGLE/MULTIPLE 11/15/2011   HYSTERECTOMY   OTHER SURGICAL HISTORY 2019  Epidural steroid injection. ([facility])   OTHER SURGICAL HISTORY 2019  Epidural steroid injection - second epidural. ([facility]   OTHER SURGICAL HISTORY 2020  Epidural steroid injection.   OTHER SURGICAL HISTORY Left 2020  Radiofrequency left lower lumbar facet joint denervation, fluoroscopic guidance. ([facility])         Social History:     Social History     Socioeconomic History    Marital status:      Spouse name: Not on file    Number of children: Not on file    Years of education: Not on file    Highest education level: Not on file   Occupational History    Not on file   Tobacco Use    Smoking status: Former     Packs/day: .3     Types: Cigarettes     Start date:      Quit date:      Years since quittin.2    Smokeless tobacco: Former   Substance and Sexual Activity    Alcohol use: Not on file    Drug use: Not on file    Sexual activity: Not on file   Other Topics Concern    Not on file   Social History Narrative    Not on file     Social Determinants of Health     Financial Resource Strain: Low Risk  (2024)    Financial Resource Strain     Within the past 12 months, have you or your family members you live with been unable to get utilities (heat, electricity) when it was really needed?: No   Food Insecurity: Low Risk  (2024)    Food Insecurity     Within the past 12 months, did you worry that your food would run out before you got money to buy more?: No     Within the past 12 months, did the food you bought just not last and you didn t have money to get more?: No   Transportation Needs: Low  "Risk  (1/17/2024)    Transportation Needs     Within the past 12 months, has lack of transportation kept you from medical appointments, getting your medicines, non-medical meetings or appointments, work, or from getting things that you need?: No   Physical Activity: Not on file   Stress: Not on file   Social Connections: Not on file   Interpersonal Safety: Not on file   Housing Stability: Low Risk  (1/17/2024)    Housing Stability     Do you have housing? : Yes     Are you worried about losing your housing?: No          Family History:   Reviewed and non contributory       Allergies:      Allergies   Allergen Reactions    Dimethicone-Zinc Oxide Anaphylaxis    Seasonal Allergies     Penicillins Swelling, Itching and Rash     Other reaction(s): Edema            Medications:     Prior to Admission medications    Medication Sig Last Dose Taking? Auth Provider Long Term End Date   acetaminophen (TYLENOL) 325 MG tablet Take 325 mg by mouth daily 4/8/2024 Yes Unknown, Entered By History     acetaminophen (TYLENOL) 325 MG tablet Take 325 mg by mouth 3 times daily as needed for mild pain  Yes Wegener, Joel Daniel Irwin, MD     diphenhydrAMINE (BENADRYL) 25 MG capsule Take 25 mg by mouth daily as needed for itching or allergies  Yes Unknown, Entered By History     ibuprofen (ADVIL/MOTRIN) 200 MG tablet Take 200 mg by mouth daily as needed for pain  Yes Unknown, Entered By History     Multiple Vitamins-Minerals (PRESERVISION AREDS 2) CAPS Take 2 capsules by mouth daily 4/8/2024 Yes Wegener, Joel Daniel Irwin, MD     sertraline (ZOLOFT) 50 MG tablet Take 1 tablet (50 mg) by mouth daily 4/8/2024 Yes Wegener, Joel Daniel Irwin, MD Yes           Review of Systems:   A complete ROS was performed and is negative other than what is stated in the HPI.       Physical Exam:   Blood pressure (!) 159/79, pulse 84, temperature 97.8  F (36.6  C), temperature source Oral, resp. rate 16, height 1.499 m (4' 11\"), weight 47.6 kg (104 lb 15 oz), " SpO2 99%.  General: Alert, interactive, NAD, sitting up in bed with daughter at the bedside, hard of hearing  HEENT: AT/NC, sclera anicteric, PERRL, EOMI, MMM  Neck: Supple, no JVD  Chest/Resp: clear to auscultation bilaterally, no crackles or wheezes  Heart/CV: regular rate and rhythm, no murmur  Abdomen/GI: Soft, nontender, nondistended. +BS.  No rebound or guarding.  Extremities/MSK: No LE edema.  Left forarm brace in place.  Muscle strength equal bilaterally  Skin: Warm and dry  Neuro: Alert & oriented to person and knows she is in a hospital and oriented to her daughter.  Not oriented to place and is a poor historian.  Reports double vision, left forearm tingling, moves all extremities equally         Labs:   ROUTINE ICU LABS (Last four results)  CMP  Recent Labs   Lab 04/08/24  1159      POTASSIUM 4.9   CHLORIDE 104   CO2 23   ANIONGAP 10   GLC 86   BUN 18.4   CR 0.87   GFRESTIMATED 61   VONDA 9.9*     CBC  Recent Labs   Lab 04/08/24  1159   WBC 4.3   RBC 3.85   HGB 9.5*   HCT 30.9*   MCV 80   MCH 24.7*   MCHC 30.7*   RDW 14.2             Imaging/Procedures:     Results for orders placed or performed during the hospital encounter of 04/08/24   MRA Angiogram Head w/o Contrast    Narrative    MRI brain without and with contrast  MRA of the head without contrast  Neck MRA without and with contrast    Provided History:  double vision, dizziness, left sided numbness.    Comparison:  MRI 1/8/2024      Technique:   Brain MRI: Multiplanar multisequence brain MRI without and with  contrast.    Head MRA: 3D time-of-flight MRA of the [place] was performed  without intravenous contrast.  Neck MRA:  Limited non contrast 2DTOF images were obtained of the  mid-cervical region. Following intravenous gadolinium-based contrast  administration, a contrast enhanced MRA of the neck/cervical vessels  was performed. Three-dimensional reconstructions of the neck and head  MRA were created, which were reviewed by  the radiologist.    Dose: 10 mL Gadavist    Findings:   Brain MRI:   Axial diffusion weighted images demonstrate no definite acute infarct.  Advanced leukoaraiosis. Mild to moderate diffuse cerebral volume loss.  There is no definite intracranial hemorrhage on susceptibility images.  Ventricles are proportionate to the cerebral sulci. Contrast-enhanced  images of the brain demonstrate no abnormal intra- or extra-axial  enhancement.    The visualized paranasal sinuses and mastoid air cells are clear.  Bilateral lens replacement.    Head MRA:  Patent major intracranial arteries. No definite aneurysm or stenosis.  Fetal origin bilateral PCA.    Neck MRA:  Patent major cervical arteries. Hypoplastic left vertebral artery. No  significant stenosis.       Impression    Impression:  1. No evidence of acute infarction or intracranial hemorrhage.  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   2. No abnormal enhancing lesions intracranially.  3. Head MRA demonstrates no definite aneurysm or stenosis of the major  intracranial arteries.  4. Neck MRA demonstrates patent major cervical arteries.         PABLO NEGRON MD         SYSTEM ID:  [id number]   MRA Angiogram Neck w/o & w Contrast    Narrative    MRI brain without and with contrast  MRA of the head without contrast  Neck MRA without and with contrast    Provided History:  double vision, dizziness, left sided numbness.    Comparison:  MRI 1/8/2024      Technique:   Brain MRI: Multiplanar multisequence brain MRI without and with  contrast.    Head MRA: 3D time-of-flight MRA of the [place] was performed  without intravenous contrast.  Neck MRA:  Limited non contrast 2DTOF images were obtained of the  mid-cervical region. Following intravenous gadolinium-based contrast  administration, a contrast enhanced MRA of the neck/cervical vessels  was performed. Three-dimensional reconstructions of the neck and head  MRA were created, which were reviewed by the  radiologist.    Dose: 10 mL Gadavist    Findings:   Brain MRI:   Axial diffusion weighted images demonstrate no definite acute infarct.  Advanced leukoaraiosis. Mild to moderate diffuse cerebral volume loss.  There is no definite intracranial hemorrhage on susceptibility images.  Ventricles are proportionate to the cerebral sulci. Contrast-enhanced  images of the brain demonstrate no abnormal intra- or extra-axial  enhancement.    The visualized paranasal sinuses and mastoid air cells are clear.  Bilateral lens replacement.    Head MRA:  Patent major intracranial arteries. No definite aneurysm or stenosis.  Fetal origin bilateral PCA.    Neck MRA:  Patent major cervical arteries. Hypoplastic left vertebral artery. No  significant stenosis.       Impression    Impression:  1. No evidence of acute infarction or intracranial hemorrhage.  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   2. No abnormal enhancing lesions intracranially.  3. Head MRA demonstrates no definite aneurysm or stenosis of the major  intracranial arteries.  4. Neck MRA demonstrates patent major cervical arteries.         PABLO NEGRON MD         SYSTEM ID:  [id number]   MR Brain w/o & w Contrast    Narrative    MRI brain without and with contrast  MRA of the head without contrast  Neck MRA without and with contrast    Provided History:  double vision, dizziness, left sided numbness.    Comparison:  MRI 1/8/2024      Technique:   Brain MRI: Multiplanar multisequence brain MRI without and with  contrast.    Head MRA: 3D time-of-flight MRA of the [place] of [place] was performed  without intravenous contrast.  Neck MRA:  Limited non contrast 2DTOF images were obtained of the  mid-cervical region. Following intravenous gadolinium-based contrast  administration, a contrast enhanced MRA of the neck/cervical vessels  was performed. Three-dimensional reconstructions of the neck and head  MRA were created, which were reviewed by the  radiologist.    Dose: 10 mL Gadavist    Findings:   Brain MRI:   Axial diffusion weighted images demonstrate no definite acute infarct.  Advanced leukoaraiosis. Mild to moderate diffuse cerebral volume loss.  There is no definite intracranial hemorrhage on susceptibility images.  Ventricles are proportionate to the cerebral sulci. Contrast-enhanced  images of the brain demonstrate no abnormal intra- or extra-axial  enhancement.    The visualized paranasal sinuses and mastoid air cells are clear.  Bilateral lens replacement.    Head MRA:  Patent major intracranial arteries. No definite aneurysm or stenosis.  Fetal origin bilateral PCA.    Neck MRA:  Patent major cervical arteries. Hypoplastic left vertebral artery. No  significant stenosis.       Impression    Impression:  1. No evidence of acute infarction or intracranial hemorrhage.  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   2. No abnormal enhancing lesions intracranially.  3. Head MRA demonstrates no definite aneurysm or stenosis of the major  intracranial arteries.  4. Neck MRA demonstrates patent major cervical arteries.         PABLO NEGRON MD         SYSTEM ID:  [id number]

## 2024-04-08 NOTE — TELEPHONE ENCOUNTER
RN from [facility]isted Living facility calls to report patient is experiencing double vision, dizziness and unsteady gait. The double vision started yesterday. The dizziness and unsteady gait started this morning. Patient also reports she fell overnight in her bathroom. She was able to get herself back up. She reports she did not hit her head. Patient has left arm weakness at baseline. Pt denies facial numbness or slurred speech. Denies history of stroke. Orientation is at baseline per RN at [facility].     Dispo is to be seen in ED now or [facility]. Pt's daughter is on her way to [facility] and will bring patient to [facility] ER for evaluation.    Stephanie Juarez RN on 4/8/2024 at 10:21 AM     Reason for Disposition   Double vision    Additional Information   Negative: Weakness of the face, arm or leg on one side of the body   Negative: Followed getting substance in the eye   Negative: Foreign body stuck in the eye   Negative: Followed an eye injury   Negative: Followed sun lamp or sun exposure (UV keratitis)   Negative: Yellow or green discharge (pus) in the eye   Negative: Pregnant   Negative: Complete loss of vision in one or both eyes   Negative: SEVERE eye pain   Negative: SEVERE headache    Answer Assessment - Initial Assessment Questions  1. DESCRIPTION:  Double vision started yesterday 4/8  2. LOCATION: Both  3. SEVERITY: Can still see images, blurry   4. ONSET: Suddenly yesterday 4/8  5. PATTERN: Constant  6. PAIN: Denies  7. CONTACTS-GLASSES: Wearing glasses   8. CAUSE: Unsure  9. OTHER SYMPTOMS: L arm weakness at baseline  Denies: Confusion, headache, arm or leg weakness, speech problems    Protocols used: Vision Loss or Change-A-OH

## 2024-04-08 NOTE — CONSULTS
Spoke with Dr. Zheng, MRI is negative.  Suspect MRI negative stroke given the acute findings of left-sided ataxia, weakness, and dipopia. She has been worked up for left sided numbness previously with MRI in January 2024, which was unrevealing except for the chronic findings also noted on this exam.Per family her difficulty walking is a new finding.  ________________________      [facility]    Stroke Telephone Note    I was called by Sofia Zheng on 04/08/24 regarding patient Keysha Anders. The patient is a 89 year old female with PMH of stroke, recent diagnosis of dementia, iron deficiency anemia, gastritics, and encephalitis (in 1960) who presents with left-sided numbness, left-sided ataxia (with difficulty walking), and diplopia.      MRI was negative for acute pathology although coronal DWI not obtained. She has extensive microvascular disease.      Vitals  BP: 124/66   Pulse: 71   Resp: 16   Temp: 97.8  F (36.6  C)   Weight: 47.6 kg (104 lb 15 oz)    Imaging Findings  MRI as above    Impression  Given constellation of symptoms, suspect MRI-negative stroke.  MRI 1/2024 was also obtained for left-sided numbness which appeared similar    Recommendations  Aspirin 325mg now  Patient apparently would like to go home:  --if she stays: admission with stroke work-up, therapy evaluation, and repeat Brain MRI with coronal DWI.  Please place telestroke consult  --if she elects to discharge: 81mg aspirin daily, BP goal < 140/90, with tighter control associated with lower overall CV risk, if tolerated. Follow-up with General Neurology 2-4 weeks    My recommendations are based on the information provided over the phone by Keysha Anders's in-person providers. They are not intended to replace the clinical judgment of her in-person providers. I was not requested to personally see or examine the patient at this time.     Obey Fernández MD, MS  Vascular Neurology    To page me or covering  "stroke neurology team member, click here: [facility]  Choose \"On Call\" tab at top, then select \"NEUROLOGY/ALL SITES\" from middle drop-down box, press Enter, then look for \"stroke\" or \"telestroke\" for your site.    I personally spent a total of 25 minutes on April 8, 2024 consulting with her  medical providers and coordinating care.  This includes personally reviewing the patient's medical record including diagnostic testing, neuroimaging, and laboratory studies.         "

## 2024-04-08 NOTE — ED PROVIDER NOTES
"  History     Chief Complaint:  Generalized Weakness and Fall     The history is provided by the patient and a relative.      Keysha Anders is a 89 year old female with history of stroke who presents to the ED for evaluation of generalized weakness and a fall. Daughter reports that nursing home staff visited the patient this morning at 0630 and the patient stated she fell in the middle of the night while going use the restroom. She was in bed when staff checked on her this morning. Patient states she put one foot on the floor and fell when she put the other foot down. Unsure whether the patient hit her head or lost consciousness. She denies symptoms prior to or during the fall. She went to bed around 2200 last night. Patient currently endorses double vision, dizziness, and left arm tingling. She is not ambulatory, which is not her baseline. Patient denies chest pain, shortness of breath, abdominal pain, headache, neck pain, or extremity pain.     Daughter states the patient complained of double vision yesterday morning, stating \"my eyes aren't working\" at the end of [facility] around 1130. Daughter also noticed patient was struggling to read the screens at [facility]. Patient was ambulatory at that time.     Independent Historian:   Daughter - They report as above.    Review of External Notes:   Reviewed discharge summary from 1/8/2024 regarding feeling off, left-sided numbness, memory loss    Medications:    Sertraline     Past Medical History:    Stroke  Ataxia  Heart murmur  Macular degeneration  Pseudophakia  Cataract  Chronic right-sided low back pain  SNHL  Gait disturbance  Spondylosis of lumbar spine  Central stenosis of spinal canal  Environmental allergies  OWEN  MDD  Hyperparathyroidism  Vitamin D deficiency  Multiple thyroid nodules  Osteoporosis  Iron deficiency anemia  RLS  Bronchopulmonary dysplasia  Recurrent pneumonia  Encephalitis  NSAID induced gastritis  Renovascular hypertension     Past Surgical " "History:    Hysterectomy  Radiofrequency left lower lumbar facet joint denervation    Physical Exam   Patient Vitals for the past 24 hrs:   BP Temp Temp src Pulse Resp SpO2 Height Weight   04/08/24 1510 (!) 159/79 -- -- 84 -- 99 % -- --   04/08/24 1405 135/85 -- -- 90 -- 98 % -- --   04/08/24 1230 124/66 -- -- 71 -- 98 % -- --   04/08/24 1215 125/67 -- -- 78 -- 97 % -- --   04/08/24 1200 125/73 -- -- 86 -- 98 % -- --   04/08/24 1145 112/70 -- -- 74 -- 99 % -- --   04/08/24 1130 (!) 142/83 -- -- 94 -- 98 % -- --   04/08/24 1110 (!) 148/82 97.8  F (36.6  C) Oral 89 16 99 % 1.499 m (4' 11\") 47.6 kg (104 lb 15 oz)     Physical Exam  General: Resting on the bed.  Head: No obvious trauma to head.  Ears, Nose, Throat:  External ears normal.  Nose normal.    Eyes:  Conjunctivae clear.  Pupils are equal, round, and reactive.   Neck: Normal range of motion.  Neck supple.   CV: Regular rate and rhythm.  No murmurs.      Respiratory: Effort normal and breath sounds normal.  No wheezing or crackles.   Gastrointestinal: Soft.  No distension. There is no tenderness.    Neuro: Alert. Moving all extremities appropriately.  Normal speech.  CN II-XII grossly intact except for left sided facial flattening, no pronator drift, normal finger-nose-finger on right but marked ataxia with left finger to nose, visual fields intact.  Gross muscle strength intact of the proximal and distal bilateral upper and lower extremities.  Sensation intact to light touch in all 4 extremities but reports decreased sensation on left compared to right.    Skin: Skin is warm and dry.  No rash noted.   Psych: Normal mood and affect. Behavior is normal.      Emergency Department Course   ECG  ECG taken at 1149, ECG read at 1150  Sinus rhythm with 1st degree AV block with premature atrial complexes  Low voltage QRS  Borderline ECG   No significant change as compared to prior, dated 1/8/24.  Rate 79 bpm. MT interval 226 ms. QRS duration 72 ms. QT/QTc 354/405 ms. " P-R-T axes 45 24 33.     Imaging:  MRA Angiogram Neck w/o & w Contrast   Final Result   Impression:   1. No evidence of acute infarction or intracranial hemorrhage.   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    2. No abnormal enhancing lesions intracranially.   3. Head MRA demonstrates no definite aneurysm or stenosis of the major   intracranial arteries.   4. Neck MRA demonstrates patent major cervical arteries.             PABLO NEGRON MD            SYSTEM ID:  [id number]      MR Brain w/o & w Contrast   Final Result   Impression:   1. No evidence of acute infarction or intracranial hemorrhage.   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    2. No abnormal enhancing lesions intracranially.   3. Head MRA demonstrates no definite aneurysm or stenosis of the major   intracranial arteries.   4. Neck MRA demonstrates patent major cervical arteries.             PABLO NEGRON MD            SYSTEM ID:  [id number]      MRA Angiogram Head w/o Contrast   Final Result   Impression:   1. No evidence of acute infarction or intracranial hemorrhage.   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    2. No abnormal enhancing lesions intracranially.   3. Head MRA demonstrates no definite aneurysm or stenosis of the major   intracranial arteries.   4. Neck MRA demonstrates patent major cervical arteries.             PABLO NEGRON MD            SYSTEM ID:  [id number]      Echocardiogram Complete - For age > 60 yrs    (Results Pending)   MR Brain w/o & w Contrast    (Results Pending)      Report per radiology    Laboratory:  Labs Ordered and Resulted from Time of ED Arrival to Time of ED Departure   BASIC METABOLIC PANEL - Abnormal       Result Value    Sodium 137      Potassium 4.9      Chloride 104      Carbon Dioxide (CO2) 23      Anion Gap 10      Urea Nitrogen 18.4      Creatinine 0.87      GFR Estimate 61      Calcium 9.9 (*)     Glucose 86     TROPONIN T, HIGH SENSITIVITY -  Abnormal    Troponin T, High Sensitivity 16 (*)    CBC WITH PLATELETS AND DIFFERENTIAL - Abnormal    WBC Count 4.3      RBC Count 3.85      Hemoglobin 9.5 (*)     Hematocrit 30.9 (*)     MCV 80      MCH 24.7 (*)     MCHC 30.7 (*)     RDW 14.2      Platelet Count 249      % Neutrophils 64      % Lymphocytes 22      % Monocytes 12      % Eosinophils 1      % Basophils 1      % Immature Granulocytes 0      NRBCs per 100 WBC 0      Absolute Neutrophils 2.7      Absolute Lymphocytes 1.0      Absolute Monocytes 0.5      Absolute Eosinophils 0.1      Absolute Basophils 0.0      Absolute Immature Granulocytes 0.0      Absolute NRBCs 0.0     GLUCOSE MONITOR NURSING POCT   GLUCOSE MONITOR NURSING POCT   GLUCOSE MONITOR NURSING POCT   HEMOGLOBIN A1C   LIPID PROFILE        Procedures  None    Emergency Department Course & Assessments:  Assessments/Consultations/Discussion of Management or Tests:  ED Course as of 04/08/24 1646   Mon Apr 08, 2024   1116 I obtained history and examined the patient as noted above.    1202 I spoke with Dr. Fernández of stroke neurology.   1422 I rechecked the patient and explained findings.    1455 I spoke with Dr. Fernández of stroke neurology again. I rechecked the patient and explained findings.   1514 I spoke with Lavern Godinez PA-C for Dr. Vieira of the hospitalist team regarding the patient, who accepted the patient for admission.        Interventions:  Medications   sertraline (ZOLOFT) tablet 50 mg (has no administration in time range)   lidocaine 1 % 0.1-1 mL (has no administration in time range)   lidocaine (LMX4) cream (has no administration in time range)   sodium chloride (PF) 0.9% PF flush 3 mL (has no administration in time range)   sodium chloride (PF) 0.9% PF flush 3 mL (has no administration in time range)   Medication Instructions - Avoid dextrose in IV solutions. (has no administration in time range)   medication instruction - No oral meds if patient didn't pass dysphagia screen (has no  administration in time range)   ondansetron (ZOFRAN ODT) ODT tab 4 mg (has no administration in time range)     Or   ondansetron (ZOFRAN) injection 4 mg (has no administration in time range)   acetaminophen (TYLENOL) tablet 650 mg (has no administration in time range)   gadobutrol (GADAVIST) injection 10 mL (10 mLs Intravenous $Given 4/8/24 1244)   sodium chloride (PF) 0.9% PF flush 60 mL (100 mLs Intravenous $Given 4/8/24 1244)   aspirin (ASA) tablet 325 mg (325 mg Oral $Given 4/8/24 1518)        Independent Interpretation (X-rays, CTs, rhythm strip):  None    Social Determinants of Health affecting care:   None    Disposition:  The patient was admitted to the hospital under the care of Dr. Vieira.     Impression & Plan    CMS Diagnoses: The patient has stroke symptoms:         ED Stroke specific documentation           NIHSS PDF     Patient last known well time: yesterday before 11 AM (unknown)  ED Provider first to bedside at: 1116 AM  CT Results received at: NA    Thrombolytics:   Not given due to:   - unclear or unfavorable risk-benefit profile for extended window thrombolysis beyond the conventional 4.5 hour time window    If treating with thrombolytics: Ensure SBP<180 and DBP<105 prior to treatment with thrombolytics.  Administering thrombolytics after treatment with IV labetalol, hydralazine, or nicardipine is reasonable once BP control is established.    Endovascular Retrieval:  Not initiated due to absence of proximal vessel occlusion    [employer] Stroke Scale (Baseline)  Time Performed: 1116 AM     Score    Level of consciousness: (0)   Alert, keenly responsive    LOC questions: (0)   Answers both questions correctly    LOC commands: (0)   Performs both tasks correctly    Best gaze: (0)   Normal    Visual: (0)   No visual loss    Facial palsy: (1)   Minor paralysis (flat nasolabial fold, smile asymmetry)    Motor arm (left): (0)   No drift    Motor arm (right): (0)   No drift    Motor  leg (left): (0)   No drift    Motor leg (right): (0)   No drift    Limb ataxia: (2)   Present in two limbs    Sensory: (1)   Mild to moderate sensory loss    Best language: (0)   Normal- no aphasia    Dysarthria: (0)   Normal    Extinction and inattention: (0)   No abnormality        Total Score:  4        Stroke Mimics were considered (including migraine headache, seizure disorder, hypoglycemia (or hyperglycemia), head or spinal trauma, CNS infection, Toxin ingestion and shock state (e.g. sepsis) .    MIPS (If applicable):  N/A    Medical Decision Makin-year-old female presents emergency department with difficulty walking, double vision, dizziness.  Vitals are reassuring.  Broad differential was pursued including but not limited to electrolyte, metabolic, renal dysfunction, ACS, arrhythmia, stroke, tumor, mass, dissection, stenosis, etc.  CBC without significant leukocytosis or anemia.  BMP without acute electrolyte, metabolic or renal dysfunction.  EKG showing sinus rhythm, no acute ischemic change.  Troponin minimally elevated but appears to be at patient's baseline.  Do not suspect ACS or arrhythmia.  Most concern for stroke.  MRI/MRA obtained after discussion with stroke neurology.  Stroke workup was pursued and MRIs were negative for acute stroke.  Given our high suspicion for stroke recommendation is for admission regardless.  Patient and family are agreeable.  Aspirin given in the ER.  Patient admitted to the hospitalist.      Diagnosis:    ICD-10-CM    1. Dizziness  R42       2. Double vision  H53.2       3. Difficulty walking  R26.2           Discharge Medications:  New Prescriptions    No medications on file        Scribe Disclosure:  Eric CONELY Hailie, am serving as a scribe at 11:16 AM on 2024 to document services personally performed by Sofia Zheng MD based on my observations and the provider's statements to me.  2024   Sofia Zheng MD Bennett, Jennifer L,  MD  04/08/24 8840

## 2024-04-08 NOTE — PHARMACY-ADMISSION MEDICATION HISTORY
Pharmacist Admission Medication History    Admission medication history is complete. The information provided in this note is only as accurate as the sources available at the time of the update.    Information Source(s): Caregiver, Facility (TCU/NH/) medication list/MAR, and CareEverywhere/SureScripts via phone    Patient from [facility]   phone     Pertinent Information: contacted facility for last doses.  Asked about Ibuprofen and Benadryl - those are still on her med list but are not on the print out sent to us . Also takes tylenol scheduled and prn     Changes made to PTA medication list:  Added: None  Deleted: None  Changed: tylenol - split prn and scheduled    Allergies reviewed with patient and updates made in EHR: no    Medication History Completed By: Bernice Cifuentes formerly [facility] 4/8/2024 2:05 PM    PTA Med List   Medication Sig Last Dose    acetaminophen (TYLENOL) 325 MG tablet Take 325 mg by mouth daily 4/8/2024    acetaminophen (TYLENOL) 325 MG tablet Take 325 mg by mouth 3 times daily as needed for mild pain     diphenhydrAMINE (BENADRYL) 25 MG capsule Take 25 mg by mouth daily as needed for itching or allergies     ibuprofen (ADVIL/MOTRIN) 200 MG tablet Take 200 mg by mouth daily as needed for pain     Multiple Vitamins-Minerals (PRESERVISION AREDS 2) CAPS Take 2 capsules by mouth daily 4/8/2024    sertraline (ZOLOFT) 50 MG tablet Take 1 tablet (50 mg) by mouth daily 4/8/2024

## 2024-04-09 ENCOUNTER — APPOINTMENT (OUTPATIENT)
Dept: SPEECH THERAPY | Facility: CLINIC | Age: 89
DRG: 065 | End: 2024-04-09
Attending: PHYSICIAN ASSISTANT
Payer: MEDICARE

## 2024-04-09 ENCOUNTER — APPOINTMENT (OUTPATIENT)
Dept: CARDIOLOGY | Facility: CLINIC | Age: 89
DRG: 065 | End: 2024-04-09
Attending: PHYSICIAN ASSISTANT
Payer: MEDICARE

## 2024-04-09 ENCOUNTER — APPOINTMENT (OUTPATIENT)
Dept: PHYSICAL THERAPY | Facility: CLINIC | Age: 89
DRG: 065 | End: 2024-04-09
Attending: PHYSICIAN ASSISTANT
Payer: MEDICARE

## 2024-04-09 ENCOUNTER — TELEPHONE (OUTPATIENT)
Dept: OPHTHALMOLOGY | Facility: CLINIC | Age: 89
End: 2024-04-09
Payer: MEDICARE

## 2024-04-09 ENCOUNTER — APPOINTMENT (OUTPATIENT)
Dept: OCCUPATIONAL THERAPY | Facility: CLINIC | Age: 89
DRG: 065 | End: 2024-04-09
Attending: PHYSICIAN ASSISTANT
Payer: MEDICARE

## 2024-04-09 LAB
ALBUMIN UR-MCNC: NEGATIVE MG/DL
APPEARANCE UR: CLEAR
BILIRUB UR QL STRIP: NEGATIVE
CHOLEST SERPL-MCNC: 206 MG/DL
COLOR UR AUTO: ABNORMAL
GLUCOSE BLDC GLUCOMTR-MCNC: 116 MG/DL (ref 70–99)
GLUCOSE BLDC GLUCOMTR-MCNC: 81 MG/DL (ref 70–99)
GLUCOSE BLDC GLUCOMTR-MCNC: 94 MG/DL (ref 70–99)
GLUCOSE BLDC GLUCOMTR-MCNC: 94 MG/DL (ref 70–99)
GLUCOSE UR STRIP-MCNC: NEGATIVE MG/DL
HDLC SERPL-MCNC: 78 MG/DL
HGB UR QL STRIP: NEGATIVE
KETONES UR STRIP-MCNC: NEGATIVE MG/DL
LDLC SERPL CALC-MCNC: 118 MG/DL
LEUKOCYTE ESTERASE UR QL STRIP: ABNORMAL
LVEF ECHO: NORMAL
NITRATE UR QL: NEGATIVE
NONHDLC SERPL-MCNC: 128 MG/DL
PH UR STRIP: 6.5 [PH] (ref 5–7)
RBC URINE: <1 /HPF
SP GR UR STRIP: 1.01 (ref 1–1.03)
TRIGL SERPL-MCNC: 49 MG/DL
TROPONIN T SERPL HS-MCNC: 17 NG/L
UROBILINOGEN UR STRIP-MCNC: NORMAL MG/DL
WBC URINE: 2 /HPF

## 2024-04-09 PROCEDURE — 97165 OT EVAL LOW COMPLEX 30 MIN: CPT | Mod: GO

## 2024-04-09 PROCEDURE — 99233 SBSQ HOSP IP/OBS HIGH 50: CPT | Performed by: INTERNAL MEDICINE

## 2024-04-09 PROCEDURE — 36415 COLL VENOUS BLD VENIPUNCTURE: CPT | Performed by: PHYSICIAN ASSISTANT

## 2024-04-09 PROCEDURE — 84484 ASSAY OF TROPONIN QUANT: CPT | Performed by: PHYSICIAN ASSISTANT

## 2024-04-09 PROCEDURE — 97530 THERAPEUTIC ACTIVITIES: CPT | Mod: GO

## 2024-04-09 PROCEDURE — 250N000013 HC RX MED GY IP 250 OP 250 PS 637: Performed by: NURSE PRACTITIONER

## 2024-04-09 PROCEDURE — G0426 INPT/ED TELECONSULT50: HCPCS | Mod: G0 | Performed by: NURSE PRACTITIONER

## 2024-04-09 PROCEDURE — 120N000001 HC R&B MED SURG/OB

## 2024-04-09 PROCEDURE — 250N000013 HC RX MED GY IP 250 OP 250 PS 637: Performed by: PHYSICIAN ASSISTANT

## 2024-04-09 PROCEDURE — 93306 TTE W/DOPPLER COMPLETE: CPT

## 2024-04-09 PROCEDURE — 97530 THERAPEUTIC ACTIVITIES: CPT | Mod: GP

## 2024-04-09 PROCEDURE — 97161 PT EVAL LOW COMPLEX 20 MIN: CPT | Mod: GP

## 2024-04-09 PROCEDURE — 81001 URINALYSIS AUTO W/SCOPE: CPT | Performed by: PHYSICIAN ASSISTANT

## 2024-04-09 PROCEDURE — 93306 TTE W/DOPPLER COMPLETE: CPT | Mod: 26 | Performed by: INTERNAL MEDICINE

## 2024-04-09 PROCEDURE — 92610 EVALUATE SWALLOWING FUNCTION: CPT | Mod: GN

## 2024-04-09 RX ORDER — HYDRALAZINE HYDROCHLORIDE 20 MG/ML
5 INJECTION INTRAMUSCULAR; INTRAVENOUS EVERY 4 HOURS PRN
Status: DISCONTINUED | OUTPATIENT
Start: 2024-04-09 | End: 2024-04-10

## 2024-04-09 RX ORDER — ASPIRIN 81 MG/1
81 TABLET ORAL DAILY
Status: DISCONTINUED | OUTPATIENT
Start: 2024-04-09 | End: 2024-04-11 | Stop reason: HOSPADM

## 2024-04-09 RX ADMIN — SERTRALINE HYDROCHLORIDE 50 MG: 50 TABLET ORAL at 08:16

## 2024-04-09 RX ADMIN — ASPIRIN 81 MG: 81 TABLET, COATED ORAL at 11:43

## 2024-04-09 RX ADMIN — ACETAMINOPHEN 650 MG: 325 TABLET, FILM COATED ORAL at 22:36

## 2024-04-09 RX ADMIN — ACETAMINOPHEN 650 MG: 325 TABLET, FILM COATED ORAL at 15:22

## 2024-04-09 ASSESSMENT — ACTIVITIES OF DAILY LIVING (ADL)
ADLS_ACUITY_SCORE: 31
ADLS_ACUITY_SCORE: 30
ADLS_ACUITY_SCORE: 31
ADLS_ACUITY_SCORE: 30
ADLS_ACUITY_SCORE: 30
ADLS_ACUITY_SCORE: 31
ADLS_ACUITY_SCORE: 30
ADLS_ACUITY_SCORE: 31

## 2024-04-09 NOTE — TELEPHONE ENCOUNTER
[facility] Call Center    Phone Message    May a detailed message be left on voicemail: yes     Reason for Call: Appointment Intake    Referring Provider Name: Leeann Jc  Diagnosis and/or Symptoms: Hospital follow up, double vision      1-2 wks        Action Taken: Message routed to:  [facility] ([facility] eye    Travel Screening: Not Applicable

## 2024-04-09 NOTE — CONSULTS
"[facility]    Stroke Consult Note    Reason for Consult:  diplopia, L sided deficits     Chief Complaint: Generalized Weakness and Fall       HPI  Keysha Anders is a 89 year old female with PMH of HTN, Alzheimer's dementia, RLS, hyperparathyroidism, osteoporosis and macular degeneration. She presented to the ED 4/8/24 for evaluation of vision and gait changes. Per family, when nursing home staff visited patient at 0630 she reported a fall in the middle of the night. At time of ED presentation she endorsed double vision, dizziness, LUE tingling. Daughter also noted that patient had endorsed episode of double vision and difficulty reading screens the prior morning (4/7) during [employer]. MRI was negative, but given constellation of symptoms elevated concern for MRI negative stroke.     Today Keysha is accompanied by her daughter Meka who contributes to review of systems and history.  Her daughter notes that she has had some sensory changes of the left hand persistently since January.  However over the past 2-3 days there has been a definite change in Keysha complaining of a sensory change involving the entire left arm along with new diplopia (Keysha describes this as \"it feels like my eyes are not moving together\") and difficulty coordinating the left leg>arm.    On examination NIH = 6 for mild sensory change throughout the left arm and leg, mild drift in the left leg difficulty with coordination in the left leg more so than the left arm.  She also incorrectly states the month and has some mild loss of fluency, although both of these peers to be more longstanding and likely related to her Alzheimer's dementia.  She describes the diplopia is horizontal and it does resolve if she closes either eye.  She notes significant baseline visual difficulties with the left eye related to macular degeneration.    Keysha and her daughter are both understandably very concerned about her diplopia as " "one of her primary hobbies is reading.    Evaluation Summarized    MRI/Head CT Brain MRI w/o (just coronal DWI) 4/8: no evidence of acute infarct     Brain MRI w/wo 4/8: no evidence of acute infarct or hemorrhage; severe chronic small chronic vessel disease   Intracranial Vasculature MRA: No LVO or significant stenosis   Cervical Vasculature MRA: No LVO, significant stenosis or dissection      Echocardiogram LVEF 60-65%, mild concentric LVH, no regional wall motion abnormalities, no thrombus identified in LV; LA is mildly dilated, spontaneous contrast in ISAIAH   EKG/Telemetry Sinus rhythm    Other Testing Not Applicable      LDL  118   A1C  4/8/2024: 5.5 %   Troponin 4/9/2024: 17 ng/L       Impression  Diplopia, L sided numbness and ataxia.Given constellation of symptoms, highly suspect MRI negative infarct due to small vessel disease     LA enlargement + spontaneous contrast in ISAIAH raising concern for occult afib     Recommendations   - Neurochecks and Vital Signs every 4 hours   - Daily aspirin 81 mg for secondary stroke prevention  - Statin:  (goal 40-7); Keysha and her daughter have indicated they prefer not to initiate statin therapy  - Telemetry, EKG  - Bedside Glucose Monitoring  - PT/OT/SLP  - Stroke Education  - Euthermia, Euglycemia  -30 day cardiac event monitor upon discharge, if atrial fibrillation not identified on telemetry (ordered)     Patient Follow-up    - in the next 1-2 week(s) with PCP  - in 6-8 weeks with general neurology or stroke ELIZABETH ([phone]), ordered  -Neuro-ophthalmology next available, ordered    Sherley MICHEL CNP  Vascular Neurology  To page me or covering stroke neurology team member, click here: AMCOM   Choose \"On Call\" tab at top, then search dropdown box for \"Neurology Adult\", select location, press Enter, then look for stroke/neuro ICU/telestroke.      MARILU Collins CNP  Vascular Neurology    To page me or covering stroke neurology team member, click " "here: AMCOM  Choose \"On Call\" tab at top, then select \"NEUROLOGY/ALL SITES\" from middle drop-down box, press Enter, then look for \"stroke\" or \"telestroke\" for your site.  _____________________________________________________    Clinically Significant Risk Factors Present on Admission                           # Financial/Environmental Concerns: none         Past Medical History    History reviewed. No pertinent past medical history.  Medications   Home Meds  Prior to Admission medications    Medication Sig Start Date End Date Taking? Authorizing Provider   acetaminophen (TYLENOL) 325 MG tablet Take 325 mg by mouth daily   Yes Unknown, Entered By History   acetaminophen (TYLENOL) 325 MG tablet Take 325 mg by mouth 3 times daily as needed for mild pain 3/15/24  Yes Wegener, Joel Daniel Irwin, MD   diphenhydrAMINE (BENADRYL) 25 MG capsule Take 25 mg by mouth daily as needed for itching or allergies   Yes Unknown, Entered By History   ibuprofen (ADVIL/MOTRIN) 200 MG tablet Take 200 mg by mouth daily as needed for pain   Yes Unknown, Entered By History   Multiple Vitamins-Minerals (PRESERVISION AREDS 2) CAPS Take 2 capsules by mouth daily 3/4/24  Yes Wegener, Joel Daniel Irwin, MD   sertraline (ZOLOFT) 50 MG tablet Take 1 tablet (50 mg) by mouth daily 6/13/23  Yes Wegener, Joel Daniel Irwin, MD       Scheduled Meds  Current Facility-Administered Medications   Medication Dose Route Frequency Provider Last Rate Last Admin    aspirin EC tablet 81 mg  81 mg Oral Daily Sherley Jc APRN CNP        sertraline (ZOLOFT) tablet 50 mg  50 mg Oral Daily Lavern Godinez PA-C   50 mg at 04/09/24 0816    sodium chloride (PF) 0.9% PF flush 3 mL  3 mL Intracatheter Q8H Lavern Godinez PA-C   3 mL at 04/09/24 0817       Infusion Meds  Current Facility-Administered Medications   Medication Dose Route Frequency Provider Last Rate Last Admin    medication instruction - No oral meds if patient didn't pass dysphagia screen   Does " "not apply Continuous PRN Lavern Godinez PA-C        Medication Instructions - Avoid dextrose in IV solutions.   Intravenous Continuous PRN Lavern Godinez PA-C           Allergies   Allergies   Allergen Reactions    Dimethicone-Zinc Oxide Anaphylaxis    Seasonal Allergies     Penicillins Swelling, Itching and Rash     Other reaction(s): Edema            PHYSICAL EXAMINATION   Temp:  [97.4  F (36.3  C)-98.2  F (36.8  C)] 97.8  F (36.6  C)  Pulse:  [71-94] 84  Resp:  [16-18] 16  BP: (112-159)/(55-85) 129/84  SpO2:  [95 %-99 %] 97 %    General Exam  General:  patient lying in bed without any acute distress    HEENT:  normocephalic/atraumatic  Pulmonary:  no respiratory distress      Neuro Exam  Mental Status:  alert, oriented to person, place, situation and age, mist states month (\"March\") , follows commands, mild loss of verbal fluency but naming and repetition normal  Cranial Nerves:  visual fields intact (tested by nurse), EOM appear grossly intact, facial sensation intact and symmetric (tested by nurse), facial movements symmetric, hearing not formally tested but intact to conversation, no dysarthria, shoulder shrug equal bilaterally, tongue protrusion midline  Motor:  no abnormal movements, able to move all limbs antigravity spontaneously with no signs of hemiparesis observed, no pronator drift, mild drift in LLE  Reflexes:  unable to test (telestroke)  Sensory: reduced sensation in L extremities, normal in R extremities no extinction on double simultaneous stimulation (assessed by nurse)  Coordination:  mild impairment in coordination of LUE, moderate to significant ataxia in LLE  Station/Gait:  unable to test due to telestroke    Stroke Scales    NIHSS  1a. Level of Consciousness 0-->Alert, keenly responsive   1b. LOC Questions 1-->Answers one question correctly   1c. LOC Commands 0-->Performs both tasks correctly   2.   Best Gaze 0-->Normal   3.   Visual 0-->No visual loss   4.   Facial Palsy 0-->Normal " "symmetrical movements   5a. Motor Arm, Left 0-->No drift, limb holds 90 (or 45) degrees for full 10 secs   5b. Motor Arm, Right 0-->No drift, limb holds 90 (or 45) degrees for full 10 secs   6a. Motor Leg, Left 1-->Drift, leg falls by the end of the 5-sec period but does not hit bed   6b. Motor Leg, right 0-->No drift, leg holds 30 degree position for full 5 secs   7.   Limb Ataxia 2-->Present in two limbs   8.   Sensory 1-->Mild-to-moderate sensory loss, patient feels pinprick is less sharp or is dull on the affected side, or there is a loss of superficial pain with pinprick, but patient is aware of being touched   9.   Best Language 1-->Mild-to-moderate aphasia, some obvious loss of fluency or facility of comprehension, without significant limitation on ideas expressed or form of expression. Reduction of speech and/or comprehension, however, makes conversation. . . (see row details)   10. Dysarthria 0-->Normal   11. Extinction and Inattention  0-->No abnormality   Total 6 (04/09/24 1002)       Imaging  I personally reviewed all imaging; relevant findings per HPI.    Labs Data   CBC  Recent Labs   Lab 04/08/24  1159   WBC 4.3   RBC 3.85   HGB 9.5*   HCT 30.9*        Basic Metabolic Panel   Recent Labs   Lab 04/09/24  0812 04/09/24  0148 04/08/24  2154 04/08/24  1914 04/08/24  1159   NA  --   --   --   --  137   POTASSIUM  --   --   --   --  4.9   CHLORIDE  --   --   --   --  104   CO2  --   --   --   --  23   BUN  --   --   --   --  18.4   CR  --   --   --   --  0.87   * 94 116*   < > 86   VONDA  --   --   --   --  9.9*    < > = values in this interval not displayed.     Liver Panel  No results for input(s): \"PROTTOTAL\", \"ALBUMIN\", \"BILITOTAL\", \"ALKPHOS\", \"AST\", \"ALT\", \"BILIDIRECT\" in the last 168 hours.  INR  No lab results found.        Stroke Consult Data Data   Telestroke Service Details  (for non-emergent stroke consult with tele)  Video start time 04/09/24   1001   Video end time 04/09/24   1040 "   Type of service telemedicine diagnostic assessment of acute neurological changes   Reason telemedicine is appropriate patient requires assessment with a specialist for diagnosis and treatment of neurological symptoms   Mode of transmission secure interactive audio and video communication per Ant   Originating site (patient location) [facility]    Distant site (provider location) [facility]       I have personally spent a total of 75 minutes providing care today, time spent in reviewing medical records and devising the plan as recorded above.    Well appearing, awake, alert, oriented to person, place, time/situation and in no apparent distress. normal...

## 2024-04-09 NOTE — PHARMACY
"Pharmacy Consult to evaluate for medication related stroke core measures    Keysha Anders, 89 year old female admitted for fall on 4/8/2024.    Thrombolytic was not given because of Time from onset contraindications    VTE Prophylaxis SCDs /PCDs placed on 4/8/24, as appropriate prior to end of hospital day 2.    Antithrombotic: aspirin started on 4/8/24, as appropriate by end of hospital day 2. Continue antithrombotic therapy on discharge to meet quality measures, unless contraindicated.    Anticoagulation if history of A-fib/flutter: Patient does not have history of A-fib/flutter - anticoagulation not required for medication related stroke core measures.     No results found for: \"LDL\"    LDL pending, pt/family prefer not to initiate statin unless LDL is severly elevated(>150)    Recommendations: None at this time    Thank you for the consult.    Krishan Toney [facility] 4/9/2024 10:57 AM    "

## 2024-04-09 NOTE — UTILIZATION REVIEW
Admission Status; Secondary Review Determination       Under the authority of the Utilization Management Committee, the utilization review process indicated a secondary review on the above patient. The review outcome is based on review of the medical records, discussions with staff, and applying clinical experience noted on the date of the review.     (x) Inpatient Status Appropriate - This patient's medical care is consistent with medical management for inpatient care and reasonable inpatient medical practice.     RATIONALE FOR DETERMINATION   89-year-old female with a complex medical history including anxiety, cataracts, spinal stenosis, hyperparathyroidism, iron deficiency anemia (KISHA), macular degeneration, thyroid nodule, osteoporosis, hypertension, restless leg syndrome (RLS), and Alzheimer's dementia, presented to the ED following an unwitnessed fall overnight and two days of double vision, left arm tingling, and left-sided ataxia. Despite being hemodynamically stable, she exhibited left-sided ataxia and continued to experience diplopia and left forearm tingling. Initial stroke workup, including MRI/MRA, was negative for acute pathology, with no focal weakness observed. Stroke neurology consultation suggested a suspected MRI-negative stroke and recommended initiation of ASA 325mg daily, repeat brain MRI with coronal DWI, and serial troponin monitoring alongside evaluations for hgb A1C, telemetry monitoring, lipid panel, and echocardiogram.   This is a conditional review for a Medicare patient, at the time of this review patient is anticipated to require at least 1 more night of hospital care; however if plan changes and discharges before 2 midnights please send for post discharge review.    This document was produced using voice recognition software       The information on this document is developed by the utilization review team in order for the business office to ensure compliance. This only denotes  the appropriateness of proper admission status and does not reflect the quality of care rendered.   The definitions of Inpatient Status and Observation Status used in making the determination above are those provided in the CMS Coverage Manual, Chapter 1 and Chapter 6, section 70.4.   Sincerely,   CAMRON PEREYRA MD   System Medical Director   Utilization Management   [facility].

## 2024-04-09 NOTE — CONSULTS
Care Management Initial Consult    General Information  Assessment completed with: Care Team Member, Children, Daughter Meka, IZZY/  Type of CM/SW Visit: Initial Assessment    Primary Care Provider verified and updated as needed: No   Readmission within the last 30 days: no previous admission in last 30 days      Reason for Consult: discharge planning  Advance Care Planning: Advance Care Planning Reviewed: present on chart          Communication Assessment  Patient's communication style: spoken language (English or Bilingual)    Hearing Difficulty or Deaf: yes   Wear Glasses or Blind: yes    Cognitive  Cognitive/Neuro/Behavioral: .WDL except, orientation  Level of Consciousness: alert, intermittent confusion  Arousal Level: opens eyes spontaneously  Orientation: disoriented to, place, time  Mood/Behavior: calm, cooperative  Best Language: 0 - No aphasia  Speech: spontaneous, clear    Living Environment:   People in home: alone     Current living Arrangements: assisted living  Name of Facility: [facility] P: [phone]   Able to return to prior arrangements: yes       Family/Social Support:  Care provided by: self, other (see comments) (facility staff)  Provides care for: no one     Children          Description of Support System: Supportive, Involved    Support Assessment: Adequate family and caregiver support    Current Resources:   Patient receiving home care services: No     Community Resources: None  Equipment currently used at home: walker, rolling  Supplies currently used at home:      Employment/Financial:  Employment Status:          Financial Concerns: none   Referral to Financial Worker: No       Does the patient's insurance plan have a 3 day qualifying hospital stay waiver?  No    Lifestyle & Psychosocial Needs:  Social Determinants of Health     Food Insecurity: Low Risk  (1/17/2024)    Food Insecurity     Within the past 12 months, did you worry that your food would run out before you got  money to buy more?: No     Within the past 12 months, did the food you bought just not last and you didn t have money to get more?: No   Depression: Not at risk (1/17/2024)    PHQ-2     PHQ-2 Score: 0   Housing Stability: Low Risk  (1/17/2024)    Housing Stability     Do you have housing? : Yes     Are you worried about losing your housing?: No   Tobacco Use: Medium Risk (1/17/2024)    Patient History     Smoking Tobacco Use: Former     Smokeless Tobacco Use: Former     Passive Exposure: Not on file   Financial Resource Strain: Low Risk  (1/17/2024)    Financial Resource Strain     Within the past 12 months, have you or your family members you live with been unable to get utilities (heat, electricity) when it was really needed?: No   Alcohol Use: Not on file   Transportation Needs: Low Risk  (1/17/2024)    Transportation Needs     Within the past 12 months, has lack of transportation kept you from medical appointments, getting your medicines, non-medical meetings or appointments, work, or from getting things that you need?: No   Physical Activity: Not on file   Interpersonal Safety: Not on file   Stress: Not on file   Social Connections: Not on file       Functional Status:  Prior to admission patient needed assistance:   Dependent ADLs:: Independent, Ambulation-walker, Bathing  Dependent IADLs:: Medication Management, Cooking, Meal Preparation  Assesssment of Functional Status: Not at baseline with ADL Functioning, Not at baseline with mobility, Not at  functional baseline    Mental Health Status:          Chemical Dependency Status:  Chemical Dependency Status: No Current Concerns             Values/Beliefs:  Spiritual, Cultural Beliefs, [employer] Practices, Values that affect care:                 Additional Information:  SW completed assessment with patient's daughter via phone. Patient has dementia. Patient lives at [facility] in [place]. She lives in memory care. Patient sometimes uses a walker but can ambulate  without staff assistance at baseline. She can dress on her own. Patient needs help with bathing and medications. Daughter gave SW permission to contact facility.     Thai P: [phone].     LVM requesting a call back.     PT was in the room. SW will follow recs. Patient does not currently have  home care.     LEXIE Hawkins, [facility]  Emergency Room   Please contact the SW on the floor in which the patient is staying for any questions or concerns

## 2024-04-09 NOTE — PLAN OF CARE
[facility]    ED Boarding Nurse Handoff Addendum Report:    Date/time: 4/8/2024, 8:51 PM    Activity Level: assist of 2, walker, and gait belt    Fall Risk: Yes:  nonskid shoes/slippers when out of bed, arm band in place, patient and family education, assistive device/personal items within reach, activity supervised, mobility aid in reach, and room door open    Active Infusions: N/A    Current Meds Due: See MAR    Current care needs: Fall precaution, Telemetry monitoring, Q4H neuro check    Oxygen requirements (liters/min and/or FiO2): N/A    Respiratory status: Room air    Vital signs (within last 30 minutes):    Vitals:    04/08/24 1510 04/08/24 1700 04/08/24 1715 04/08/24 1948   BP: (!) 159/79 115/71 114/59 (!) 140/73   BP Location:   Right arm Right arm   Patient Position:   Semi-Thomas's    Pulse: 84 90 90 76   Resp:   16 16   Temp:   97.8  F (36.6  C) 98.2  F (36.8  C)   TempSrc:   Oral Oral   SpO2: 99%  97% 98%   Weight:       Height:           Focused assessment within last 30 minutes:    A&Ox3, disoriented to place, baseline A&Ox2, redirectable, Ax2 with walker and gait belt to C, reported tingling on LFA to Left hand, no weakness noted.     ED Boarding Nurse name: Jody Reilly RN

## 2024-04-09 NOTE — PROGRESS NOTES
[facility]    Medicine Progress Note - Hospitalist Service    Date of Admission:  4/8/2024    Assessment & Plan    Keysha Anders is a 89 year old female with a history of Anxiety, Cataract, Spinal Stenosis, Hyperparathyroidism, KISHA, Macular Degeneration, Thyroid Nodule, Osteoporosis, HTN, RLS, Alzheimer's Dementia who presented to the ED today after a fall overnight. When patient woke up this morning, patient was seeing double, hypertensive and unsteady. Left arm is tingly.       Pt presents from her [facility] facility with an unwitnessed fall overnight and two days of double vision.  She reports left arm tingling, but has also reported this in the past and wears a LUE brace for suspected carpal tunnel.  She has a history of macular degeneration and cataracts.  Per ED report, was noted to have left sided ataxia with difficulty walking on admission prompting stoke work up.     Left-sided numbness, Left-sided ataxia and Diplopia  -Currently her mental state appears to be at baseline levels  -Reported new focal symptomatology  -Still having diplopia but no dizziness or lightheadedness  -Earlier imaging with brain MRI study showed negative findings  -Awaiting stroke neurology follow-up input.  Potentially may need repeat MRI studies.  Will defer to stroke neurology  -Currently being managed on aspirin alone at 3 and 25 mg daily  -Stroke workup underway  -Patient input from ancillary support services with PT, OT and SLP  -Remain on cardiac telemonitoring       Alzheimer's Dementia  Anxiety  Lives at [facility].    - continue Sertraline  -Mental state appears to be at baseline     Chronic Anemia  Hgb 9.5 is at baseline     CODE: DNR/DNI  Diet/IVF: regular  DVT ppx: scd          Diet: Combination Diet Regular Diet    DVT Prophylaxis: Pneumatic Compression Devices and Ambulate every shift  Irizarry Catheter: Not present  Lines: None     Cardiac Monitoring: ACTIVE order. Indication: Stroke,  acute (48 hours)  Code Status: No CPR- Do NOT Intubate      Clinically Significant Risk Factors Present on Admission                           # Financial/Environmental Concerns: none         Disposition Plan     Expected Discharge Date: 04/10/2024                    Edis Stoll MD, MD  Hospitalist Service  [employer]  Securely message with [employer] (more info)  Text page via [employer] Paging/Directory   ______________________________________________________________________    Interval History   I assumed medicine service care today.  Seen and examined.  Chart reviewed.  Case discussed with nursing service earlier.  Family updated this patient's daughter present at bedside during my encounter.  I met this very pleasant lady while she is sitting comfortably in the hospital recliner.  She endorses no ongoing headaches, nausea, vomiting reportedly able to tolerate oral diet with no aspiration like symptomatology.  She still having some diplopia but denies any lightheadedness or dizziness.  Mental state appears to be at baseline levels.  No reported chest pain or shortness of breath, no bleeding tendencies.  Denies any vomiting or diarrhea.  Voiding freely.  Passing flatus.  Currently afebrile.  Not requiring any oxygen support.    Physical Exam   Vital Signs: Temp: 97.8  F (36.6  C) Temp src: Oral BP: 129/84 Pulse: 84   Resp: 16 SpO2: 97 % O2 Device: None (Room air)    Weight: 101 lbs 6.59 oz    HEENT; Atraumatic, normocephalic, pinkish conjuctiva, pupils bilateral reactive   Skin: warm and moist, no rashes  Lungs: equal chest expansion, clear to auscultation, no wheezes, no stridor, no crackles,   Heart: normal rate, normal rhythm, no rubs or gallops.   Abdomen: normal bowel sounds, no tenderness, no peritoneal signs, no guarding  Extremities: no deformities, no edema   Neuro; follow commands, alert and oriented x3, spontaneous speech, coherent, moves all extremities spontaneously  Psych; no  hallucination, euthymic mood, not agitated      Medical Decision Making       50 MINUTES SPENT BY ME on the date of service doing chart review, history, exam, documentation & further activities per the note.  MANAGEMENT DISCUSSED with the following over the past 24 hours: Yes   NOTE(S)/MEDICAL RECORDS REVIEWED over the past 24 hours: Yes       Data     I have personally reviewed the following data over the past 24 hrs:    4.3  \   9.5 (L)   / 249     137 104 18.4 /  116 (H)   4.9 23 0.87 \     Trop: 17 (H) BNP: N/A     TSH: N/A T4: N/A A1C: 5.5       Imaging results reviewed over the past 24 hrs:   Recent Results (from the past 24 hour(s))   MRA Angiogram Head w/o Contrast    Narrative    MRI brain without and with contrast  MRA of the head without contrast  Neck MRA without and with contrast    Provided History:  double vision, dizziness, left sided numbness.    Comparison:  MRI 1/8/2024      Technique:   Brain MRI: Multiplanar multisequence brain MRI without and with  contrast.    Head MRA: 3D time-of-flight MRA of the [place] was performed  without intravenous contrast.  Neck MRA:  Limited non contrast 2DTOF images were obtained of the  mid-cervical region. Following intravenous gadolinium-based contrast  administration, a contrast enhanced MRA of the neck/cervical vessels  was performed. Three-dimensional reconstructions of the neck and head  MRA were created, which were reviewed by the radiologist.    Dose: 10 mL Gadavist    Findings:   Brain MRI:   Axial diffusion weighted images demonstrate no definite acute infarct.  Advanced leukoaraiosis. Mild to moderate diffuse cerebral volume loss.  There is no definite intracranial hemorrhage on susceptibility images.  Ventricles are proportionate to the cerebral sulci. Contrast-enhanced  images of the brain demonstrate no abnormal intra- or extra-axial  enhancement.    The visualized paranasal sinuses and mastoid air cells are clear.  Bilateral lens  replacement.    Head MRA:  Patent major intracranial arteries. No definite aneurysm or stenosis.  Fetal origin bilateral PCA.    Neck MRA:  Patent major cervical arteries. Hypoplastic left vertebral artery. No  significant stenosis.       Impression    Impression:  1. No evidence of acute infarction or intracranial hemorrhage.  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   2. No abnormal enhancing lesions intracranially.  3. Head MRA demonstrates no definite aneurysm or stenosis of the major  intracranial arteries.  4. Neck MRA demonstrates patent major cervical arteries.         PABLO NEGRON MD         SYSTEM ID:  [id number]   MR Brain w/o & w Contrast    Narrative    MRI brain without and with contrast  MRA of the head without contrast  Neck MRA without and with contrast    Provided History:  double vision, dizziness, left sided numbness.    Comparison:  MRI 1/8/2024      Technique:   Brain MRI: Multiplanar multisequence brain MRI without and with  contrast.    Head MRA: 3D time-of-flight MRA of the [place] of [place] was performed  without intravenous contrast.  Neck MRA:  Limited non contrast 2DTOF images were obtained of the  mid-cervical region. Following intravenous gadolinium-based contrast  administration, a contrast enhanced MRA of the neck/cervical vessels  was performed. Three-dimensional reconstructions of the neck and head  MRA were created, which were reviewed by the radiologist.    Dose: 10 mL Gadavist    Findings:   Brain MRI:   Axial diffusion weighted images demonstrate no definite acute infarct.  Advanced leukoaraiosis. Mild to moderate diffuse cerebral volume loss.  There is no definite intracranial hemorrhage on susceptibility images.  Ventricles are proportionate to the cerebral sulci. Contrast-enhanced  images of the brain demonstrate no abnormal intra- or extra-axial  enhancement.    The visualized paranasal sinuses and mastoid air cells are clear.  Bilateral lens  replacement.    Head MRA:  Patent major intracranial arteries. No definite aneurysm or stenosis.  Fetal origin bilateral PCA.    Neck MRA:  Patent major cervical arteries. Hypoplastic left vertebral artery. No  significant stenosis.       Impression    Impression:  1. No evidence of acute infarction or intracranial hemorrhage.  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   2. No abnormal enhancing lesions intracranially.  3. Head MRA demonstrates no definite aneurysm or stenosis of the major  intracranial arteries.  4. Neck MRA demonstrates patent major cervical arteries.         PABLO NEGRON MD         SYSTEM ID:  [id number]   MRA Angiogram Neck w/o & w Contrast    Narrative    MRI brain without and with contrast  MRA of the head without contrast  Neck MRA without and with contrast    Provided History:  double vision, dizziness, left sided numbness.    Comparison:  MRI 1/8/2024      Technique:   Brain MRI: Multiplanar multisequence brain MRI without and with  contrast.    Head MRA: 3D time-of-flight MRA of the [place] of [place] was performed  without intravenous contrast.  Neck MRA:  Limited non contrast 2DTOF images were obtained of the  mid-cervical region. Following intravenous gadolinium-based contrast  administration, a contrast enhanced MRA of the neck/cervical vessels  was performed. Three-dimensional reconstructions of the neck and head  MRA were created, which were reviewed by the radiologist.    Dose: 10 mL Gadavist    Findings:   Brain MRI:   Axial diffusion weighted images demonstrate no definite acute infarct.  Advanced leukoaraiosis. Mild to moderate diffuse cerebral volume loss.  There is no definite intracranial hemorrhage on susceptibility images.  Ventricles are proportionate to the cerebral sulci. Contrast-enhanced  images of the brain demonstrate no abnormal intra- or extra-axial  enhancement.    The visualized paranasal sinuses and mastoid air cells are clear.  Bilateral lens  replacement.    Head MRA:  Patent major intracranial arteries. No definite aneurysm or stenosis.  Fetal origin bilateral PCA.    Neck MRA:  Patent major cervical arteries. Hypoplastic left vertebral artery. No  significant stenosis.       Impression    Impression:  1. No evidence of acute infarction or intracranial hemorrhage.  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   2. No abnormal enhancing lesions intracranially.  3. Head MRA demonstrates no definite aneurysm or stenosis of the major  intracranial arteries.  4. Neck MRA demonstrates patent major cervical arteries.         PABLO NEGRON MD         SYSTEM ID:  [id number]   MR Brain w/o Contrast    Narrative    EXAM: MR BRAIN W/O CONTRAST  LOCATION: [facility]  DATE: 4/8/2024    INDICATION: per Stroke Neurology, recommend repeat Brain MRI with coronal DWI  COMPARISON: 01/08/2024. 04/08/2024  TECHNIQUE: Head MRI without IV contrast including axial and coronal diffusion weighted imaging,     FINDINGS:  INTRACRANIAL CONTENTS: No acute or subacute infarct. No obvious extra-axial collection. No midline shift. Mild to moderate generalized cerebral atrophy. No hydrocephalus.     OTHER: Accounting for technique no additional abnormalities identified.      Impression    IMPRESSION:  1.  No acute or subacute ischemic change.

## 2024-04-09 NOTE — PLAN OF CARE
Goal Outcome Evaluation:      Plan of Care Reviewed With: patient    Overall Patient Progress: improvingOverall Patient Progress: improving    Outcome Evaluation: Double vision still present, eye patch available for her to use per her preference, tingeling on left arm and hand unchanged. chronic back pain and hip pain, Tylenol PRN given with good pain relief. Pt ambulates with GB, Walker and one assist    Shift : 0700 - 1900     Vitals: Temp: 97.6  F (36.4  C) Temp src: Oral BP: 137/76 Pulse: 71   Resp: 16 SpO2: 99 % O2 Device: None (Room air)       Orientation: disoriented to time and place, [place] with personal hearing aids present is at baseline.   Neuro: left hand and arm tingling, minor strengths and  coordination divination to right side present   Pain: chronic lower back pain and hip pain when long time in bed, Tylenol PRN given, good pain relief. Ambulation to chair helps with pain too  Tele: SR  Activity: ambulates with GB and walker and one assist, pt has posterior leaning and some unsteadiness, at arm length at all times when ambulating    Resp: LS clear stable on RA  Diet: ate her meals, good appetite   How to take meds: whole with fluids   GI: no BM this shift  : voids spontaneously, continent   B, 94, 84. 24 hours BG in range, no need to check again, see orders   Skin: WDL  Lines: PIV SL and patent   Other: family a bedside most of the day,   Plan: cont with plan of care      Problem: Adult Inpatient Plan of Care  Goal: Plan of Care Review  Description: The Plan of Care Review/Shift note should be completed every shift.  The Outcome Evaluation is a brief statement about your assessment that the patient is improving, declining, or no change.  This information will be displayed automatically on your shift  note.  Outcome: Progressing  Flowsheets (Taken 2024 1603)  Outcome Evaluation: Double vision still present, eye patch available for her to use per her preference, tingeling on left arm and  "hand unchanged. chronic back pain and hip pain, Tylenol PRN given with good pain relief. Pt ambulates with GB, Walker and one assist  Plan of Care Reviewed With: patient  Overall Patient Progress: improving  Goal: Patient-Specific Goal (Individualized)  Description: You can add care plan individualizations to a care plan. Examples of Individualization might be:  \"Parent requests to be called daily at 9am for status\", \"I have a hard time hearing out of my right ear\", or \"Do not touch me to wake me up as it startles  me\".  Outcome: Progressing  Goal: Absence of Hospital-Acquired Illness or Injury  Outcome: Progressing  Intervention: Identify and Manage Fall Risk  Recent Flowsheet Documentation  Taken 4/9/2024 1522 by Vane Johnson RN  Safety Promotion/Fall Prevention:   safety round/check completed   nonskid shoes/slippers when out of bed   clutter free environment maintained  Taken 4/9/2024 1320 by Vane Johnson RN  Safety Promotion/Fall Prevention: safety round/check completed  Taken 4/9/2024 1116 by Vane Johnson RN  Safety Promotion/Fall Prevention:   safety round/check completed   nonskid shoes/slippers when out of bed   clutter free environment maintained  Taken 4/9/2024 0955 by Vane Johnson RN  Safety Promotion/Fall Prevention: safety round/check completed  Taken 4/9/2024 0815 by Vane Johnson RN  Safety Promotion/Fall Prevention: safety round/check completed  Taken 4/9/2024 0755 by Vane Johnson RN  Safety Promotion/Fall Prevention:   safety round/check completed   nonskid shoes/slippers when out of bed   clutter free environment maintained  Intervention: Prevent Skin Injury  Recent Flowsheet Documentation  Taken 4/9/2024 1522 by Vane Johnson RN  Body Position: position changed independently  Taken 4/9/2024 1320 by Vane Johnson RN  Body Position: position changed independently  Taken 4/9/2024 1116 by Vane Johnson RN  Body Position: position changed independently  Skin " Protection:   adhesive use limited   incontinence pads utilized  Device Skin Pressure Protection: absorbent pad utilized/changed  Taken 4/9/2024 0815 by Vane Johnson RN  Body Position: position changed independently  Taken 4/9/2024 0755 by Vane Johnson RN  Body Position: position changed independently  Intervention: Prevent and Manage VTE (Venous Thromboembolism) Risk  Recent Flowsheet Documentation  Taken 4/9/2024 1522 by Vane Johnson RN  VTE Prevention/Management: SCDs (sequential compression devices) off  Taken 4/9/2024 1116 by Vane Johnson RN  VTE Prevention/Management: (ambulation) SCDs (sequential compression devices) off  Goal: Optimal Comfort and Wellbeing  Outcome: Progressing  Intervention: Monitor Pain and Promote Comfort  Recent Flowsheet Documentation  Taken 4/9/2024 1522 by Vane Johnson RN  Pain Management Interventions: medication (see MAR)  Goal: Readiness for Transition of Care  Outcome: Progressing     Problem: Comorbidity Management  Goal: Blood Pressure in Desired Range  Outcome: Progressing  Intervention: Maintain Blood Pressure Management  Recent Flowsheet Documentation  Taken 4/9/2024 1522 by Vane Johnson RN  Medication Review/Management: medications reviewed     Problem: Pain Acute  Goal: Optimal Pain Control and Function  Outcome: Progressing  Intervention: Develop Pain Management Plan  Recent Flowsheet Documentation  Taken 4/9/2024 1522 by Vane Johnson RN  Pain Management Interventions: medication (see MAR)  Intervention: Prevent or Manage Pain  Recent Flowsheet Documentation  Taken 4/9/2024 1522 by Vane Johnson RN  Medication Review/Management: medications reviewed

## 2024-04-09 NOTE — PROGRESS NOTES
04/09/24 1044   Appointment Info   Signing Clinician's Name / Credentials (OT) Christ Maria, OTR/L   Living Environment   People in Home alone;facility resident   Current Living Arrangements other (see comments)  (Memory care)   Home Accessibility no concerns   Transportation Anticipated family or friend will provide   Self-Care   Usual Activity Tolerance moderate   Current Activity Tolerance fair   Regular Exercise No   Equipment Currently Used at Home walker, standard;shower chair   Fall history within last six months yes   Number of times patient has fallen within last six months 1   Activity/Exercise/Self-Care Comment Pt IND at baseline, occasionally uses FWW. Pt receives assist w/ showering. Facility provides assist with IADL   General Information   Onset of Illness/Injury or Date of Surgery 04/08/24   Referring Physician Lavern Godinez PA-C   Patient/Family Therapy Goal Statement (OT) To return home   Additional Occupational Profile Info/Pertinent History of Current Problem Keysha Anders is a 89 year old female with a history of Anxiety, Cataract, Spinal Stenosis, Hyperparathyroidism, KISHA, Macular Degeneration, Thyroid Nodule, Osteoporosis, HTN, RLS, Alzheimer's Dementia who presented with double vision and difficulty ambulating following a fall.   Existing Precautions/Restrictions fall   Cognitive Status Examination   Orientation Status person   Follows Commands follows one-step commands;50-74% accuracy;physical/tactile prompts required;repetition of directions required;verbal cues/prompting required;delayed response/completion   Cognitive Status Comments Oriented to self. The patient has Alzheimer's Dementia and resides in .   Visual Perception   Visual Impairment/Limitations diplopia;other (see comments)  (Macular degeneration primarily impacting L eye.)   Visual Motor Impairment convergence, left   Sensory   Sensory Comments Mild-to-moderate sensory loss on affected side   Range of Motion  Comprehensive   General Range of Motion bilateral upper extremity ROM WFL   Strength Comprehensive (MMT)   Comment, General Manual Muscle Testing (MMT) Assessment Deconditioned   Coordination   Coordination Comments L UE impaired. Slow with finger to nose.   Bed Mobility   Comment (Bed Mobility) SBA EOB>Supine. CGA scooting along EOB   Transfers   Transfers sit-stand transfer;bed-chair transfer   Transfer Skill: Bed to Chair/Chair to Bed   Bed-Chair [place] (Transfers) minimum assist (75% patient effort)   Sit-Stand Transfer   Sit-Stand [place] (Transfers) contact guard   Balance   Balance Comments Unsteady, requiring hands on assist with all transfers and mobility   Activities of Daily Living   BADL Assessment/Intervention lower body dressing;grooming;toileting   Lower Body Dressing Assessment/Training   Comment, (Lower Body Dressing) per clinical judgement   [place] Level (Lower Body Dressing) maximum assist (25% patient effort)   Grooming Assessment/Training   [place] Level (Grooming) moderate assist (50% patient effort)   Comment, (Grooming) per clinical judgement   Toileting   Comment, (Toileting) per clinical judgement   [place] Level (Toileting) maximum assist (25% patient effort)   Clinical Impression   Criteria for Skilled Therapeutic Interventions Met (OT) Yes, treatment indicated   OT Diagnosis Decline in function   OT Problem List-Impairments impacting ADL problems related to;activity tolerance impaired;balance;cognition;mobility;strength;sensation;coordination   Assessment of Occupational Performance 5 or more Performance Deficits   Identified Performance Deficits Dressing, bathing, toileting, functional mobility, liesure, social participation, and other IADL   Planned Therapy Interventions (OT) ADL retraining;cognition;motor coordination training;neuromuscular re-education;strengthening;transfer training;visual perception;progressive activity/exercise;risk factor education    Clinical Decision Making Complexity (OT) detailed assessment/moderate complexity   Risk & Benefits of therapy have been explained evaluation/treatment results reviewed;care plan/treatment goals reviewed;risks/benefits reviewed;current/potential barriers reviewed;participants voiced agreement with care plan;participants included;patient   OT Total Evaluation Time   OT Eval, Low Complexity Minutes (34550) 10   OT Goals   Therapy Frequency (OT) 6 times/week   OT Predicted Duration/Target Date for Goal Attainment 04/16/24   OT Goals Hygiene/Grooming;Upper Body Dressing;Lower Body Dressing;Toilet Transfer/Toileting;OT Goal 1   OT: Hygiene/Grooming supervision/stand-by assist;using adaptive equipment   OT: Upper Body Dressing Supervision/stand-by assist;using adaptive equipment   OT: Lower Body Dressing Supervision/stand-by assist;using adaptive equipment   OT: Toilet Transfer/Toileting Supervision/stand-by assist;toilet transfer;cleaning and garment management;using adaptive equipment   OT: Goal 1 The patient and/or caregiver will demonstrate independence in completion or provision of vision HEP.   Therapeutic Activities   Therapeutic Activity Minutes (76815) 40   Symptoms noted during/after treatment fatigue   Treatment Detail/Skilled Intervention The patient is seated in chair at encounter and daughter present, both agreeable to OT session. Extended time spent educating pt and dtr on vision exercises and guiding pt through 4 seperate exercises: pencil pushups, figure 8's, near<>far focus, and tracking. Moderate verbal cueing throughout each exercise to facilitate correct eye movements and participation. The patient declines increased dizziness/nausea w/ exercises. Edu on completion of exercises daily and handout provided. Family to assist. Pt completes sit<> stand x4 throughout session. CGA STS, cues to wait for FWW placement. Stand > sit pt requires CGA and verbal cues to reach back for sitting surface. ambulating  in room around end of bed x2 and back to recliner x2. Total of ~40 feet ambulating. The pt varies between moderate - CGA for balance, FWW used throughout. The pt w/ increased difficulty when focusing on each step, cues provided to focus on destination and gait improves, moving CG-min A w/ FWW. Losing balance 3-4x w/ min-mod A for stability. Positioned supine w/ alarm on and all needs in reach at OT departure.   OT Discharge Planning   OT Plan Standing g/h, toilet transfer, vision exercises.   OT Discharge Recommendation (DC Rec) Transitional Care Facility   OT Rationale for DC Rec The patient presents below baseline. Typically able to move and complete basic ADL with no assist. Now requiring Ax1 for all transfers and ADL. Limited by vision, balance, and incoordination. Recommend continued OT while inpatient and at TCU.   Total Session Time   Timed Code Treatment Minutes 40   Total Session Time (sum of timed and untimed services) 50

## 2024-04-09 NOTE — TELEPHONE ENCOUNTER
Pt still inpatient at [facility].    Tentatively schedule first available with Dr. Morelos/neuro-ophthalmology May 13th at 0730    Information will be on discharge instructions.    Note to  to also mail out new patient packet.    Melvin Lujan RN 2:56 PM 04/09/24

## 2024-04-09 NOTE — PROGRESS NOTES
"Clinical Swallow Evaluation (CSE):     04/09/24 0820   Appointment Info   Signing Clinician's Name / Credentials (SLP) Wanda Mills MS CCC-SLP   General Information   Onset of Illness/Injury or Date of Surgery 04/08/24   Referring Physician Lavern Godinez PA-C   Patient/Family Therapy Goal Statement (SLP) To fix double vision   Pertinent History of Current Problem   Per provider \"Keysha Anders is a 89 year old female with a history of Anxiety, Cataract, Spinal Stenosis, Hyperparathyroidism, KISHA, Macular Degeneration, Thyroid Nodule, Osteoporosis, HTN, RLS, Alzheimer's Dementia who presented to the ED today after a fall overnight. When patient woke up this morning, patient was seeing double, hypertensive and unsteady. Left arm is tingly.\" MRI: \"No acute or subacute ischemic change.\"     General Observations   Pt alert, pleasant, upright in chair for breakfast meal. Clear and fluent speech. Pt reports ongoing double vision, which resolves when she closes her left eye -- twitching of left eye also noted.     Pain Assessment   Patient Currently in Pain No   Type of Evaluation   Type of Evaluation Swallow Evaluation   Oral Motor   Oral Musculature generally intact   Structural Abnormalities none present   Mucosal Quality good   Dentition (Oral Motor)   Dentition (Oral Motor) adequate dentition   Facial Symmetry (Oral Motor)   Facial Symmetry (Oral Motor) WNL   Lip Function (Oral Motor)   Lip Range of Motion (Oral Motor) WNL   Tongue Function (Oral Motor)   Tongue ROM (Oral Motor) WNL   Vocal Quality/Secretion Management (Oral Motor)   Vocal Quality (Oral Motor) WNL   Secretion Management (Oral Motor) WNL   General Swallowing Observations   Past History of Dysphagia None per EMR or pt report; regular/thin diet per baseline facility paperwork   Respiratory Support room air   Current Diet/Method of Nutritional Intake (General Swallowing Observations, NIS) regular diet;thin liquids (level 0)   Swallowing Evaluation " Clinical swallow evaluation   Clinical Swallow Evaluation   Feeding Assistance no assistance needed   Clinical Swallow Evaluation Textures Trialed thin liquids;solid foods;pureed   Clinical Swallow Eval: Thin Liquid Texture Trial   Mode of Presentation, Thin Liquids cup;straw   Volume of Liquid or Food Presented 3 oz; meds whole/water via RN   Oral Phase of Swallow WFL   Pharyngeal Phase of Swallow throat clearing   Diagnostic Statement throat clear x1   Clinical Swallow Evaluation: Puree Solid Texture Trial   Mode of Presentation, Puree spoon;self-fed   Volume of Puree Presented 8 oz   Oral Phase, Puree WFL   Pharyngeal Phase, Puree intact   Diagnostic Statement no overt signs/sx aspiration noted   Clinical Swallow Evaluation: Solid Food Texture Trial   Mode of Presentation self-fed   Volume Presented 2 bites of toast   Oral Phase WFL   Pharyngeal Phase intact   Diagnostic Statement no overt signs/sx aspiration noted   Esophageal Phase of Swallow   Patient reports or presents with symptoms of esophageal dysphagia No   Swallowing Recommendations   Diet Consistency Recommendations regular diet;thin liquids (level 0)   Supervision Level for Intake patient independent   Medication Administration Recommendations, Swallowing (SLP) whole with water   Clinical Impression   Criteria for Skilled Therapeutic Interventions Met (SLP Eval) Evaluation only;No problems identified which require skilled intervention   SLP Diagnosis no appreciable oropharyngeal dysphagia   Risks & Benefits of therapy have been explained evaluation/treatment results reviewed;care plan/treatment goals reviewed;risks/benefits reviewed;current/potential barriers reviewed;participants voiced agreement with care plan;participants included;patient   Clinical Impression Comments   Clinical swallow evaluation completed with thin liquids, puree solids, regular solids. Pt currently presents with no appreciable oropharyngeal dysphagia. WFL oromotor exam, labial  seal/oral containment, mastication, oral propulsion, oral clearance, no percievable delay in swallow, notable laryngeal elevation to palpation. X1 throat clear across all PO observed, no other overt clinical signs/sx aspiration noted. Pt denies all dysphagia, aspiration symptoms when asked.     SLP Total Evaluation Time   Eval: oral/pharyngeal swallow function, clinical swallow Minutes (75160) 14   SLP Discharge Planning   SLP Discharge Recommendation   (baseline facility)   SLP Rationale for DC Rec pt at baseline   SLP Brief overview of current status  regular/thin diet with general safe swallow precautions   Total Session Time   Total Session Time (sum of timed and untimed services) 14

## 2024-04-09 NOTE — PROGRESS NOTES
04/09/24 0953   Appointment Info   Signing Clinician's Name / Credentials (PT) Avani Stiles DPT   Living Environment   People in Home alone   Current Living Arrangements assisted living   Home Accessibility no concerns   Transportation Anticipated family or friend will provide   Self-Care   Usual Activity Tolerance moderate   Current Activity Tolerance fair   Regular Exercise No   Equipment Currently Used at Home walker, standard;shower chair   Fall history within last six months yes   Number of times patient has fallen within last six months 1   Activity/Exercise/Self-Care Comment Pt IND at baseline, occasionally uses FWW. Pt receives assist w/ showering and meds. Goes down to dining room for meals   General Information   Onset of Illness/Injury or Date of Surgery 04/08/24   Referring Physician Lavern Godinez PA-C   Patient/Family Therapy Goals Statement (PT) Return home   Pertinent History of Current Problem (include personal factors and/or comorbidities that impact the POC) Keysha Anders is a 89 year old female with a history of Anxiety, Cataract, Spinal Stenosis, Hyperparathyroidism, KISHA, Macular Degeneration, Thyroid Nodule, Osteoporosis, HTN, RLS, Alzheimer's Dementia who presented to the ED today after a fall overnight. When patient woke up this morning, patient was seeing double, hypertensive and unsteady. Left arm is tingly.       Pt presents from her memory care facility with an unwitnessed fall overnight and two days of double vision.  She reports left arm tingling, but has also reported this in the past and wears a LUE brace for suspected carpal tunnel.  She has a history of macular degeneration and cataracts.  Per ED report, was noted to have left sided ataxia with difficulty walking on admission prompting stoke work up   Existing Precautions/Restrictions fall   General Observations Pt reports double vision   Cognition   Affect/Mental Status (Cognition) WFL   Orientation Status (Cognition)  oriented to;person;place   Follows Commands (Cognition) follows one-step commands;over 90% accuracy   Pain Assessment   Patient Currently in Pain   (Pt reports some back pain)   Integumentary/Edema   Integumentary/Edema no deficits were identifed   Posture    Posture Forward head position;Protracted shoulders   Range of Motion (ROM)   Range of Motion ROM is WFL   Strength (Manual Muscle Testing)   Strength (Manual Muscle Testing) Able to perform R SLR;Able to perform L SLR;Deficits observed during functional mobility   Bed Mobility   Comment, (Bed Mobility) Not observed, pt in recliner upon therapist arrival   Transfers   Comment, (Transfers) Sit to stand CGA   Gait/Stairs (Locomotion)   Comment, (Gait/Stairs) Pt amb w/ FWW and Min A   Balance   Balance Comments Fair seated and poor standing   Sensory Examination   Sensory Perception Comments L UE tingling   Clinical Impression   Criteria for Skilled Therapeutic Intervention Yes, treatment indicated   PT Diagnosis (PT) Impaired functional mobility and gait   Influenced by the following impairments Pain, weakness, decreased activity tolerance, impaired balance, vision   Functional limitations due to impairments Limited functional mobility requiring AD and assist   Clinical Presentation (PT Evaluation Complexity) stable   Clinical Presentation Rationale Based on PMH, current status, and social support   Clinical Decision Making (Complexity) low complexity   Planned Therapy Interventions (PT) balance training;bed mobility training;gait training;strengthening;transfer training;progressive activity/exercise   Risk & Benefits of therapy have been explained evaluation/treatment results reviewed;care plan/treatment goals reviewed;risks/benefits reviewed;current/potential barriers reviewed;participants voiced agreement with care plan;participants included;patient;daughter   PT Total Evaluation Time   PT Eval, Low Complexity Minutes (41478) 10   Physical Therapy Goals   PT  Frequency Daily   PT Predicted Duration/Target Date for Goal Attainment 04/16/24   PT Goals Bed Mobility;Transfers;Gait   PT: Bed Mobility Independent;Supine to/from sit   PT: Transfers Modified independent;Sit to/from stand;Assistive device   PT: Gait Modified independent;Assistive device;150 feet   Interventions   Interventions Quick Adds Therapeutic Activity   Therapeutic Activity   Therapeutic Activities: dynamic activities to improve functional performance Minutes (63857) 19   Symptoms Noted During/After Treatment Fatigue   Treatment Detail/Skilled Intervention Pt in recliner upon therapist arrival, agreeable to PT. Pt's daughter present for session, very supportive. Pt reporting double vision throughout session. Performed sit to stand w/ FWW and CGA, pt demos posterior lean, requiring assist and VCs to shift weight forward. Pt amb 15' to BR w/ FWW and Min A, pt demos posterior lean, slow speed, instability. Pt limited by double vision. Pt requiring cueing to turn and sit on toilet. Pt required assist for brief. Pt able to void and perform pericares. Pt performed sit to stand w/ FWW and Min A. Pt continues to demo heavy posterior lean. Pt amb back to recliner w/ FWW and Min A. Pt turning to sit before squared above chair, requiring cueing to turn all the way and reach back for chair. Pt sat in recliner, able to shift hips back w/ cues. Pt in recliner at end of session, LE elevated, chair alarm on, all needs w/in reach, daughter at side, RN updated.   PT Discharge Planning   PT Plan Progress transfers and amb w/ FWW, assess bed mob   PT Discharge Recommendation (DC Rec) Transitional Care Facility   PT Rationale for DC Rec Pt below baseline, currently Ax1 for transfers and amb. Pt very unable, falls risk, limited by double vision. Currently recommend TCU to increased strength and improve balance.   PT Brief overview of current status Ax1 w/ FWW   Total Session Time   Timed Code Treatment Minutes 19   Total  Session Time (sum of timed and untimed services) 29

## 2024-04-09 NOTE — PLAN OF CARE
"Care from [id number]  Inpatient Progress Note - [facility]  For vital signs and complete assessments, please see documentation flowsheets.     Pt admitted to [facility] at 2100. Connected to V and tele upon arrival. Given patient bill of rights, questions answered. Belongings remain with patient. Pt was oriented to the room and staff.    ORIENTATION: Aox4 - forgetful.  VSS.  PAIN: Reported chronic back pain.  O2: RA  TELE: Placed this shift.  GI/: Continent.  ACTIVITY: A2 Gb, walker. Very weak.  DIET: Regular, takes meds whole with water.  LINES/DRAINS: PIV SL  BG: BG when arrived to the unit was 116.  MAJOR SHIFT EVENT:  PLAN: UA to be collected. Neuro/tele-stroke, speech following. PT/OT/SWRK following.    Goal Outcome Evaluation:      Plan of Care Reviewed With: patient    Overall Patient Progress: no changeOverall Patient Progress: no change    Outcome Evaluation: Double vision still present, pt states no change compared to ED admission, tingling remains in L hand, reported chronic back pain with PRN Tylenol administered.    Problem: Adult Inpatient Plan of Care  Goal: Plan of Care Review  Description: The Plan of Care Review/Shift note should be completed every shift.  The Outcome Evaluation is a brief statement about your assessment that the patient is improving, declining, or no change.  This information will be displayed automatically on your shift  note.  Outcome: Progressing  Flowsheets (Taken 4/8/2024 2233)  Outcome Evaluation: Double vision still present, pt states no change compared to ED admission, tingling remains in L hand, reported chronic back pain with PRN Tylenol administered.  Plan of Care Reviewed With: patient  Overall Patient Progress: no change  Goal: Patient-Specific Goal (Individualized)  Description: You can add care plan individualizations to a care plan. Examples of Individualization might be:  \"Parent requests to be called daily at 9am for status\", \"I have a hard time hearing out of my right ear\", " "or \"Do not touch me to wake me up as it startles  me\".  Outcome: Progressing  Goal: Absence of Hospital-Acquired Illness or Injury  Outcome: Progressing  Intervention: Identify and Manage Fall Risk  Recent Flowsheet Documentation  Taken 4/8/2024 2107 by Maria M Soto, RN  Safety Promotion/Fall Prevention:   safety round/check completed   supervised activity   room organization consistent   room near nurse's station   room door open   patient and family education   nonskid shoes/slippers when out of bed   mobility aid in reach   lighting adjusted  Intervention: Prevent Skin Injury  Recent Flowsheet Documentation  Taken 4/8/2024 2107 by Maria M Soto, RN  Body Position: position changed independently  Goal: Optimal Comfort and Wellbeing  Outcome: Progressing  Intervention: Monitor Pain and Promote Comfort  Recent Flowsheet Documentation  Taken 4/8/2024 2130 by Maria M Soto, RN  Pain Management Interventions: medication (see MAR)  Goal: Readiness for Transition of Care  Outcome: Progressing  Intervention: Mutually Develop Transition Plan  Recent Flowsheet Documentation  Taken 4/8/2024 2110 by Maria M Soto, RN  Equipment Currently Used at Home: walker, rolling     "

## 2024-04-10 ENCOUNTER — APPOINTMENT (OUTPATIENT)
Dept: OCCUPATIONAL THERAPY | Facility: CLINIC | Age: 89
DRG: 065 | End: 2024-04-10
Payer: MEDICARE

## 2024-04-10 ENCOUNTER — APPOINTMENT (OUTPATIENT)
Dept: PHYSICAL THERAPY | Facility: CLINIC | Age: 89
DRG: 065 | End: 2024-04-10
Payer: MEDICARE

## 2024-04-10 PROCEDURE — 99232 SBSQ HOSP IP/OBS MODERATE 35: CPT | Performed by: INTERNAL MEDICINE

## 2024-04-10 PROCEDURE — 97535 SELF CARE MNGMENT TRAINING: CPT | Mod: GO

## 2024-04-10 PROCEDURE — 97116 GAIT TRAINING THERAPY: CPT | Mod: GP | Performed by: PHYSICAL THERAPIST

## 2024-04-10 PROCEDURE — 120N000001 HC R&B MED SURG/OB

## 2024-04-10 PROCEDURE — 97530 THERAPEUTIC ACTIVITIES: CPT | Mod: GP | Performed by: PHYSICAL THERAPIST

## 2024-04-10 PROCEDURE — 250N000013 HC RX MED GY IP 250 OP 250 PS 637: Performed by: NURSE PRACTITIONER

## 2024-04-10 PROCEDURE — 250N000013 HC RX MED GY IP 250 OP 250 PS 637: Performed by: PHYSICIAN ASSISTANT

## 2024-04-10 RX ADMIN — SERTRALINE HYDROCHLORIDE 50 MG: 50 TABLET ORAL at 08:21

## 2024-04-10 RX ADMIN — ASPIRIN 81 MG: 81 TABLET, COATED ORAL at 08:21

## 2024-04-10 RX ADMIN — ACETAMINOPHEN 650 MG: 325 TABLET, FILM COATED ORAL at 22:59

## 2024-04-10 ASSESSMENT — ACTIVITIES OF DAILY LIVING (ADL)
ADLS_ACUITY_SCORE: 31
ADLS_ACUITY_SCORE: 30
ADLS_ACUITY_SCORE: 31
ADLS_ACUITY_SCORE: 30
ADLS_ACUITY_SCORE: 31
ADLS_ACUITY_SCORE: 30
ADLS_ACUITY_SCORE: 30
ADLS_ACUITY_SCORE: 31
ADLS_ACUITY_SCORE: 30
ADLS_ACUITY_SCORE: 31
ADLS_ACUITY_SCORE: 30
ADLS_ACUITY_SCORE: 31
ADLS_ACUITY_SCORE: 31
ADLS_ACUITY_SCORE: 30

## 2024-04-10 NOTE — PROGRESS NOTES
[facility]    Medicine Progress Note - Hospitalist Service    Date of Admission:  4/8/2024    Assessment & Plan    Keysha Anders is a 89 year old female with a history of Anxiety, Cataract, Spinal Stenosis, Hyperparathyroidism, KISHA, Macular Degeneration, Thyroid Nodule, Osteoporosis, HTN, RLS, Alzheimer's Dementia who presented to the ED today after a fall overnight. When patient woke up this morning, patient was seeing double, hypertensive and unsteady. Left arm is tingly.       Pt presents from her memory care facility with an unwitnessed fall overnight and two days of double vision.  She reports left arm tingling, but has also reported this in the past and wears a LUE brace for suspected carpal tunnel.  She has a history of macular degeneration and cataracts.  Per ED report, was noted to have left sided ataxia with difficulty walking on admission prompting stoke work up.     Left-sided numbness, Left-sided ataxia and Diplopia  High suspicion for MRI negative small vessel CVA    -Presented with constellation of symptoms of left-sided numbness, with accompanying ataxia and diplopia    -Currently her mental state appears to be at baseline levels  -No reported new focal symptoms  -Stable diplopia  -Appreciate prompt and continuous input from stroke neurology  -Plans for cardiac event monitor upon discharge  -Will benefit in seeing neuro-ophthalmology and follow-up with stroke neurology  -Patient had a mildly elevated blood pressure levels earlier  -Currently normotensive  -Not requiring any maintenance regimen  -On aspirin 81 mg for secondary stroke prevention  -No further plans for repeat imaging as per stroke neurology  -Patient input from ancillary support services with PT, OT and SLP  -Remain on cardiac telemonitoring     -With her underlying deconditioned state therapy services recommending TCU placement  -Will request social service input for discharge planning disposition    Alzheimer's  Dementia  Anxiety  Lives at [facility].    - continue Sertraline  -Mental state appears to be at baseline     Chronic Anemia  Hgb 9.5 is at baseline  Stable hemoglobin with no bleeding tendencies     CODE: DNR/DNI  Diet/IVF: regular  DVT ppx: scd          Diet: Combination Diet Regular Diet    DVT Prophylaxis: Pneumatic Compression Devices and Ambulate every shift  Irizarry Catheter: Not present  Lines: None     Cardiac Monitoring: ACTIVE order. Indication: Stroke, acute (48 hours)  Code Status: No CPR- Do NOT Intubate      Clinically Significant Risk Factors                             # Financial/Environmental Concerns: none         Disposition Plan     Expected Discharge Date: 04/10/2024                    Edis Stoll MD, MD  Hospitalist Service  [facility]  Securely message with [employer] (more info)  Text page via [facility] Paging/Directory   ______________________________________________________________________    Interval History   Continuing medicine service care today.  Seen and examined.  Chart reviewed.   Family updated as patient's supportive daughter present at bedside during my encounter.  I met Ms. Chopra this morning while she is sitting comfortably in the hospital chair and trying to eat her breakfast food tray.  She appears to be in good spirits as she endorses no ongoing dizziness or lightheadedness.  She denies any nausea or vomiting.  Still having issues with double vision of which she is getting frustrated about it as she is an avid reader.  She denies any ongoing headaches.  No focal weakness.  Stable hemodynamics.  Afebrile.  Not requiring oxygen support.    Physical Exam   Vital Signs: Temp: 98  F (36.7  C) Temp src: Oral BP: 98/63 Pulse: 79   Resp: 16 SpO2: 98 % O2 Device: None (Room air)    Weight: 101 lbs 6.59 oz    HEENT; Atraumatic, normocephalic, pinkish conjuctiva, pupils bilateral reactive   Skin: warm and moist, no rashes  Lungs: equal chest  expansion, clear to auscultation, no wheezes, no stridor, no crackles,   Heart: normal rate, normal rhythm, no rubs or gallops.   Abdomen: normal bowel sounds, no tenderness, no peritoneal signs, no guarding  Extremities: no deformities, no edema   Neuro; follow commands, alert and oriented x3, spontaneous speech, coherent, moves all extremities spontaneously  Psych; no hallucination, euthymic mood, not agitated      Medical Decision Making       45 MINUTES SPENT BY ME on the date of service doing chart review, history, exam, documentation & further activities per the note.  MANAGEMENT DISCUSSED with the following over the past 24 hours: Yes   NOTE(S)/MEDICAL RECORDS REVIEWED over the past 24 hours: Yes       Data         Imaging results reviewed over the past 24 hrs:   Recent Results (from the past 24 hour(s))   Echocardiogram Complete - For age > 60 yrs   Result Value    LVEF  60-65%    Narrative    [id number]  [id number]  [id number]  [id number]^JUN^S     [facility]  Echocardiography Laboratory  [address] [place] [address]     Name: QUETA TERESA  MRN: [id number]  : 1929  Study Date: 2024 02:50 PM  Age: 89 yrs  Gender: Female  Patient Location: [facility]  Reason For Study: Cerebrovascular Incident  Ordering Physician: JUN HENDERSON  Referring Physician: Wegener, Joel Daniel Irwin  Performed By: Daysi Valencia     BSA: 1.4 m2  Height: 59 in  Weight: 104 lb  HR: 74  BP: 159/79 mmHg  ______________________________________________________________________________  Procedure  Complete Portable Echo Adult.  ______________________________________________________________________________  Interpretation Summary     Technically difficult imaging  A cardiac source of embolus was not identiied.  Sinus rhythm was noted.  Left ventricular systolic function is normal.  The visual ejection fraction is 60-65%.  The left ventricle is normal in size.  There is mild concentric left  ventricular hypertrophy.  The left atrium is mildly dilated.  There is mild (1+) aortic regurgitation.  There is mild (1+) mitral regurgitation.     No old studies for comparison  ______________________________________________________________________________  Left Ventricle  The left ventricle is normal in size. There is mild concentric left  ventricular hypertrophy. Left ventricular systolic function is normal. The  visual ejection fraction is 60-65%. Grade I or early diastolic dysfunction. No  regional wall motion abnormalities noted. There is no thrombus seen in the  left ventricle.     Right Ventricle  The right ventricle is normal in structure, function and size. There is no  mass or thrombus in the right ventricle.     Atria  The left atrium is mildly dilated. Right atrial size is normal. There is no  atrial shunt seen. Spontaneous contrast in left atrial appendage.     Mitral Valve  The mitral valve leaflets appear normal. There is no evidence of stenosis,  fluttering, or prolapse. There is mild (1+) mitral regurgitation. There is no  mitral valve stenosis.     Tricuspid Valve  Normal tricuspid valve. The right ventricular systolic pressure is elevated at  21.5 mmHg. Right ventricle systolic pressure estimate normal. There is mild  (1+) tricuspid regurgitation. There is no tricuspid stenosis.     Aortic Valve  There is mild trileaflet aortic sclerosis. There is mild (1+) aortic  regurgitation. No aortic stenosis is present.     Pulmonic Valve  Normal pulmonic valve. There is no pulmonic valvular regurgitation. There is  no pulmonic valvular stenosis.     Vessels  The aortic root is normal size. Normal size ascending aorta. The inferior vena  cava is normal. The pulmonary artery is normal size.     Pericardium  The pericardium appears normal. There is no pleural effusion.     Rhythm  Sinus rhythm was noted.  ______________________________________________________________________________  MMode/2D Measurements &  Calculations  IVSd: 1.2 cm     LVIDd: 3.6 cm  LVIDs: 2.0 cm  LVPWd: 1.2 cm  FS: 42.8 %  LV mass(C)d: 143.5 grams  LV mass(C)dI: 102.8 grams/m2  Ao root diam: 3.7 cm  LVOT diam: 2.0 cm  LVOT area: 3.1 cm2  Ao root diam index Ht(cm/m): 2.5  Ao root diam index BSA (cm/m2): 2.7  LA Volume (BP): 43.5 ml  LA Volume Index (BP): 31.1 ml/m2  RV Base: 3.0 cm     RWT: 0.67  TAPSE: 1.5 cm     Doppler Measurements & Calculations  PA V2 max: 126.0 cm/sec  PA max P.4 mmHg  TR max miah: 231.7 cm/sec  TR max P.5 mmHg  RV S Miah: 14.1 cm/sec     ______________________________________________________________________________  Report approved by: Dr. Stoney Heller 2024 03:44 PM

## 2024-04-10 NOTE — PROGRESS NOTES
Called patient to confirm 6/28 @ 10:45.  No VM SR 6/27 I was called for high blood pressures.  Patient was admitted with vision changes that started about 36 hours ago.  MRI showed no stroke.  Stroke neurology was consulted and suspects MRI negative stroke.  She is at least 36 hours out now.  I put in an order for very low-dose hydralazine for systolic blood pressure over 180.

## 2024-04-10 NOTE — PROVIDER NOTIFICATION
"Germán SONI contacted at 2208 about elevated systolic BPs (160s, and 170s-180s after ambulation) and the pt reported feeling \"off\" without a headache or vision change beyond the double vision she came in with.    MD ordered PRN Hydralazine for BP>180.     Not administered at this time due to order parameters not met, BP came back down to 145/82 at this time.  "

## 2024-04-10 NOTE — PLAN OF CARE
"Care from 1900-0730  Inpatient Progress Note - MS3  For vital signs and complete assessments, please see documentation flowsheets.     ORIENTATION: Alert, confused/forgetful.  VSS.  PAIN: Chronic back/hip pain; PRN Tylenol administered. Denies CP, SOB, n/v dizziness.  O2: RA  TELE: SR  GI/: Continent; ambulates to restroom/  ACTIVITY: A2 GB/walker; very weak; keep at arms length at all times.  DIET: Regular  LINES/DRAINS: L PIV SL.  PLAN: Neuro team following, PT/OT/SLP following.     Goal Outcome Evaluation:      Plan of Care Reviewed With: patient    Overall Patient Progress: no changeOverall Patient Progress: no change    Outcome Evaluation: Neuros in tact compared to when the pt was admitted - double vision present and L arm tingling. Chronic back pain; administered PRN Tylenol.    Problem: Adult Inpatient Plan of Care  Goal: Plan of Care Review  Description: The Plan of Care Review/Shift note should be completed every shift.  The Outcome Evaluation is a brief statement about your assessment that the patient is improving, declining, or no change.  This information will be displayed automatically on your shift  note.  Outcome: Progressing  Flowsheets (Taken 4/10/2024 0313)  Outcome Evaluation:   Neuros in tact compared to when the pt was admitted - double vision present and L arm tingling. Chronic back pain   administered PRN Tylenol.  Plan of Care Reviewed With: patient  Overall Patient Progress: no change  Goal: Patient-Specific Goal (Individualized)  Description: You can add care plan individualizations to a care plan. Examples of Individualization might be:  \"Parent requests to be called daily at 9am for status\", \"I have a hard time hearing out of my right ear\", or \"Do not touch me to wake me up as it startles  me\".  Outcome: Progressing  Goal: Absence of Hospital-Acquired Illness or Injury  Outcome: Progressing  Intervention: Identify and Manage Fall Risk  Recent Flowsheet Documentation  Taken 4/10/2024 " 0311 by Maria M Soto RN  Safety Promotion/Fall Prevention: safety round/check completed  Taken 4/9/2024 2128 by Maria M Soto RN  Safety Promotion/Fall Prevention:   safety round/check completed   supervised activity   room organization consistent   room near nurse's station   room door open   patient and family education   nonskid shoes/slippers when out of bed   mobility aid in reach   lighting adjusted   increase visualization of patient   increased rounding and observation   clutter free environment maintained   assistive device/personal items within reach   activity supervised  Intervention: Prevent Skin Injury  Recent Flowsheet Documentation  Taken 4/9/2024 2128 by Maria M Soto RN  Body Position: position changed independently  Device Skin Pressure Protection: absorbent pad utilized/changed  Intervention: Prevent and Manage VTE (Venous Thromboembolism) Risk  Recent Flowsheet Documentation  Taken 4/9/2024 2128 by Maria M Soto RN  VTE Prevention/Management: SCDs (sequential compression devices) off  Intervention: Prevent Infection  Recent Flowsheet Documentation  Taken 4/9/2024 2128 by Maria M Soto RN  Infection Prevention:   rest/sleep promoted   single patient room provided  Goal: Optimal Comfort and Wellbeing  Outcome: Progressing  Intervention: Monitor Pain and Promote Comfort  Recent Flowsheet Documentation  Taken 4/9/2024 2236 by Maria M Soto RN  Pain Management Interventions: medication (see MAR)  Goal: Readiness for Transition of Care  Outcome: Progressing     Problem: Comorbidity Management  Goal: Blood Pressure in Desired Range  Outcome: Progressing  Intervention: Maintain Blood Pressure Management  Recent Flowsheet Documentation  Taken 4/9/2024 2128 by Maria M Soto RN  Medication Review/Management: medications reviewed     Problem: Pain Acute  Goal: Optimal Pain Control and Function  Outcome: Progressing  Intervention: Develop Pain Management Plan  Recent Flowsheet  Documentation  Taken 4/9/2024 2236 by Maria M Soto, RN  Pain Management Interventions: medication (see MAR)  Intervention: Prevent or Manage Pain  Recent Flowsheet Documentation  Taken 4/9/2024 2128 by Maria M Soto, RN  Medication Review/Management: medications reviewed

## 2024-04-10 NOTE — PROGRESS NOTES
Care Management Follow Up    Length of Stay (days): 2    Expected Discharge Date: 04/10/2024     Concerns to be Addressed: all concerns addressed in this encounter       Anticipated Discharge Disposition: Transitional Care  Anticipated Discharge Services: None  Anticipated Discharge DME: None    Patient/family educated on Medicare website which has current facility and service quality ratings: yes  Education Provided on the Discharge Plan: Yes  Patient/Family in Agreement with the Plan:      Referrals Placed by CM/SW: Post Acute Facilities  Private pay costs discussed: private room/amenity fees    Additional Information:  CM following for care coordination/discharge planning. PT recommending TCU for discharge. CM met with patient and her dtr Meka at the bedside who were in agreement with going to TCU for more physical and occupational therapy. They shared that the patient has been a resident of [facility] for about five weeks. They would like to send referrals to [facility] and [facility] with no preference for a private or a shared room at this time, CM sent referrals. Dtr stated she can transport for discharge.     Leeann Munoz, RN, BSN  Inpatient Care Coordination  [facility]  [phone]

## 2024-04-10 NOTE — CONSULTS
Sw/CM already following pt. Will continue to assist with discharge planning.    Social work will continue to follow and assist with discharge planning as needed.    LEXIE Ruff, [facility]  Inpatient Care Coordination  [facility]  [phone]

## 2024-04-11 ENCOUNTER — APPOINTMENT (OUTPATIENT)
Dept: OCCUPATIONAL THERAPY | Facility: CLINIC | Age: 89
DRG: 065 | End: 2024-04-11
Payer: MEDICARE

## 2024-04-11 ENCOUNTER — DOCUMENTATION ONLY (OUTPATIENT)
Dept: GERIATRICS | Facility: CLINIC | Age: 89
End: 2024-04-11
Payer: MEDICARE

## 2024-04-11 ENCOUNTER — APPOINTMENT (OUTPATIENT)
Dept: CARDIOLOGY | Facility: CLINIC | Age: 89
DRG: 065 | End: 2024-04-11
Attending: NURSE PRACTITIONER
Payer: MEDICARE

## 2024-04-11 VITALS
WEIGHT: 101.41 LBS | OXYGEN SATURATION: 94 % | RESPIRATION RATE: 18 BRPM | HEART RATE: 84 BPM | SYSTOLIC BLOOD PRESSURE: 130 MMHG | TEMPERATURE: 98.3 F | HEIGHT: 59 IN | DIASTOLIC BLOOD PRESSURE: 63 MMHG | BODY MASS INDEX: 20.44 KG/M2

## 2024-04-11 PROCEDURE — 97530 THERAPEUTIC ACTIVITIES: CPT | Mod: GO

## 2024-04-11 PROCEDURE — 250N000013 HC RX MED GY IP 250 OP 250 PS 637: Performed by: NURSE PRACTITIONER

## 2024-04-11 PROCEDURE — 93228 REMOTE 30 DAY ECG REV/REPORT: CPT | Performed by: INTERNAL MEDICINE

## 2024-04-11 PROCEDURE — 250N000013 HC RX MED GY IP 250 OP 250 PS 637: Performed by: PHYSICIAN ASSISTANT

## 2024-04-11 PROCEDURE — 93270 REMOTE 30 DAY ECG REV/REPORT: CPT

## 2024-04-11 PROCEDURE — 97535 SELF CARE MNGMENT TRAINING: CPT | Mod: GO

## 2024-04-11 PROCEDURE — 99239 HOSP IP/OBS DSCHRG MGMT >30: CPT | Performed by: INTERNAL MEDICINE

## 2024-04-11 RX ORDER — ASPIRIN 81 MG/1
81 TABLET ORAL DAILY
DISCHARGE
Start: 2024-04-12 | End: 2024-05-21

## 2024-04-11 RX ADMIN — ASPIRIN 81 MG: 81 TABLET, COATED ORAL at 09:00

## 2024-04-11 RX ADMIN — SERTRALINE HYDROCHLORIDE 50 MG: 50 TABLET ORAL at 09:00

## 2024-04-11 ASSESSMENT — ACTIVITIES OF DAILY LIVING (ADL)
ADLS_ACUITY_SCORE: 30

## 2024-04-11 NOTE — PLAN OF CARE
"Care from 7310-9997  Inpatient Progress Note    /57 (BP Location: Right arm, Patient Position: Chair, Cuff Size: Adult Small)   Pulse 73   Temp 97.6  F (36.4  C) (Oral)   Resp 18   Ht 1.499 m (4' 11\")   Wt 46 kg (101 lb 6.6 oz)   SpO2 98%   BMI 20.48 kg/m      A&O to self and situation. Intermittent confusion noted. VSS, RA. On tele running SR. Up w/ A1 gbw-- unsteady/wobbly gait r/t double vision but no generalized weakness reported. Pt also denies pain or discomfort at this time. Sleeping b/t cares. Bed alarm on. Regular safety checks implemented. SW following for TCU placement.     Problem: Adult Inpatient Plan of Care  Goal: Plan of Care Review  Description: The Plan of Care Review/Shift note should be completed every shift.  The Outcome Evaluation is a brief statement about your assessment that the patient is improving, declining, or no change.  This information will be displayed automatically on your shift  note.  Outcome: Progressing  Flowsheets (Taken 4/10/2024 2234)  Outcome Evaluation: Neuros intact. Denies pain or discomfort. Ambulating w/ A1 gbw.  Plan of Care Reviewed With: patient  Overall Patient Progress: improving  Goal: Patient-Specific Goal (Individualized)  Description: You can add care plan individualizations to a care plan. Examples of Individualization might be:  \"Parent requests to be called daily at 9am for status\", \"I have a hard time hearing out of my right ear\", or \"Do not touch me to wake me up as it startles  me\".  Outcome: Progressing  Goal: Absence of Hospital-Acquired Illness or Injury  Outcome: Progressing  Intervention: Identify and Manage Fall Risk  Recent Flowsheet Documentation  Taken 4/10/2024 2100 by Josselyn Islas RN  Safety Promotion/Fall Prevention:   activity supervised   clutter free environment maintained   increase visualization of patient   nonskid shoes/slippers when out of bed   patient and family education   safety round/check completed   treat " reversible contributory factors   treat underlying cause  Intervention: Prevent Infection  Recent Flowsheet Documentation  Taken 4/10/2024 2100 by Josselyn Islas RN  Infection Prevention: rest/sleep promoted  Goal: Optimal Comfort and Wellbeing  Outcome: Progressing  Goal: Readiness for Transition of Care  Outcome: Progressing   Goal Outcome Evaluation:      Plan of Care Reviewed With: patient    Overall Patient Progress: improvingOverall Patient Progress: improving    Outcome Evaluation: Neuros intact. Denies pain or discomfort. Ambulating w/ A1 gbw.

## 2024-04-11 NOTE — PROGRESS NOTES
[facility]    Medicine Progress Note - Hospitalist Service    Date of Admission:  4/8/2024    Assessment & Plan    Keysha Anders is a 89 year old female with a history of Anxiety, Cataract, Spinal Stenosis, Hyperparathyroidism, KISHA, Macular Degeneration, Thyroid Nodule, Osteoporosis, HTN, RLS, Alzheimer's Dementia who presented to the ED today after a fall overnight. When patient woke up this morning, patient was seeing double, hypertensive and unsteady. Left arm is tingly.       Pt presents from her memory care facility with an unwitnessed fall overnight and two days of double vision.  She reports left arm tingling, but has also reported this in the past and wears a LUE brace for suspected carpal tunnel.  She has a history of macular degeneration and cataracts.  Per ED report, was noted to have left sided ataxia with difficulty walking on admission prompting stoke work up.     Left-sided numbness, Left-sided ataxia and Diplopia  High suspicion for MRI negative small vessel CVA    -Presented with constellation of symptoms of left-sided numbness, with accompanying ataxia and diplopia    -Currently her mental state appears to be at baseline levels  -No reported new focal symptoms  -Stable diplopia  -Appreciate prompt and continuous input from stroke neurology  -Plans for cardiac event monitor upon discharge  -Will benefit in seeing neuro-ophthalmology and follow-up with stroke neurology  -Patient had a mildly elevated blood pressure levels earlier  -Currently normotensive  -No need for antihypertensive regimen for now  -Not requiring any maintenance regimen  -On aspirin 81 mg for secondary stroke prevention  -No further plans for repeat imaging as per stroke neurology  -Patient input from ancillary support services with PT, OT and SLP  -Remain on cardiac telemonitoring     -With her underlying deconditioned state therapy services recommending TCU placement  -Will request social service input  for discharge planning disposition    Alzheimer's Dementia  Anxiety  Lives at [facility].    - continue Sertraline  -Mental state appears to be at baseline     Chronic Anemia  Hgb 9.5 is at baseline  Stable hemoglobin with no bleeding tendencies     CODE: DNR/DNI  Diet/IVF: regular  DVT ppx: scd          Diet: Combination Diet Regular Diet    DVT Prophylaxis: Pneumatic Compression Devices and Ambulate every shift  Irizarry Catheter: Not present  Lines: None     Cardiac Monitoring: ACTIVE order. Indication: Tachyarrhythmias, acute (48 hours)  Code Status: No CPR- Do NOT Intubate      Clinically Significant Risk Factors                             # Financial/Environmental Concerns: none         Disposition Plan     Expected Discharge Date: 04/11/2024      Destination: inpatient rehabilitation facility              Edis Stoll MD, MD  Hospitalist Service  [facility]  Securely message with [employer] (more info)  Text page via [facility] Paging/Directory   ______________________________________________________________________    Interval History   Continuing medicine service care today.  Seen and examined.  Chart reviewed.   As always Ms. Anders remained very pleasant and interactive.  I met her this morning while she is sitting comfortably in the hospital chair.  She is conversational, somewhat hard of hearing but following simple verbal instructions.  No significant reported issues overnight.  Afebrile.  Able to tolerate oral diet.  Not requiring  any oxygen support        Physical Exam   Vital Signs: Temp: 97.4  F (36.3  C) Temp src: Oral BP: (!) 141/75 Pulse: 83   Resp: 18 SpO2: 98 % O2 Device: None (Room air)    Weight: 101 lbs 6.59 oz    HEENT; Atraumatic, normocephalic, pinkish conjuctiva, pupils bilateral reactive   Skin: warm and moist, no rashes  Lungs: equal chest expansion, clear to auscultation, no wheezes, no stridor, no crackles,   Heart: normal rate, normal rhythm, no rubs  or gallops.   Abdomen: normal bowel sounds, no tenderness, no peritoneal signs, no guarding  Extremities: no deformities, no edema   Neuro; follow commands, alert and oriented x3, spontaneous speech, coherent, moves all extremities spontaneously  Hard of hearing  Psych; no hallucination, euthymic mood, not agitated      Medical Decision Making       40 MINUTES SPENT BY ME on the date of service doing chart review, history, exam, documentation & further activities per the note.  MANAGEMENT DISCUSSED with the following over the past 24 hours: Yes   NOTE(S)/MEDICAL RECORDS REVIEWED over the past 24 hours: Yes       Data         Imaging results reviewed over the past 24 hrs:   No results found for this or any previous visit (from the past 24 hour(s)).

## 2024-04-11 NOTE — PROGRESS NOTES
[facility]  PRIMARY CARE PROVIDER AND CLINIC:  Joel Daniel Wegener, MD, [address] / [facility] [address]  Chief Complaint   Patient presents with    Hospital F/U     [place] Medical Record Number:  [id number]  Place of Service where encounter took place:  [facility] ([facility]) [[id number]]    Keysha Anders  is a 89 year old  (12/31/1929), admitted to the above facility from  [facility]. Hospital stay 4/8/24 through 4/11/24..  Admitted to this facility for  rehab, medical management, and nursing care.     Ataxia, unspecified  Paresthesia of left arm  Diplopia  Small vessel stroke (H)  Personal history of fall  Physical deconditioning  Alzheimer's disease, unspecified (CODE) (H)  Anxiety disorder, unspecified type  Anemia, unspecified type  Exudative age-related macular degeneration, bilateral, with inactive scar (H)  Hyperparathyroidism, unspecified (H24)    HPI:    HPI information obtained from: facility chart records, facility staff, patient report, [facility] chart review, and Care Everywhere [facility] chart review.   Brief Summary of Hospital Course: pt had a numb feeling back in January briefly daughter said and again briefly on 4/7 in [facility]. But p;piror to admission to hospital on 4/8, she had a fall and 2 days of double vision, ataxia on left side and left arm numbness that did not go away. She was taken to the  hospital , stroke W/U was C/w stroke despite negative MRI--though to be small vessel source. She was started on asa, seen by neurology, given eye patch and her BP improved(had been high). Sent for rehab.    TODAY DURING EXAM/ROS: says weak but no dizziness when up with PT.  She has no numbness in left arm now. Says vision is not double when left eye patch on. Other wise has: CP, SOB, Cough,   fevers, chills, HA, N/V, dysuria or Bowel Abnormalities. Appetite is fair.  No pain      CODE STATUS/ADVANCE DIRECTIVES  "DISCUSSION:   DNR / DNI  Patient's living condition: lives in an assisted living facility-[facility]  ALLERGIES: Dimethicone-zinc oxide, Honey bee venom, Wasp venom protein, Seasonal allergies, and Penicillins  PAST MEDICAL HISTORY:  has no past medical history on file.  PAST SURGICAL HISTORY:   has no past surgical history on file.  FAMILY HISTORY: family history is not on file.  SOCIAL HISTORY:   reports that she quit smoking about 11 years ago. Her smoking use included cigarettes. She started smoking about 77 years ago. She has a 19.8 pack-year smoking history. She has quit using smokeless tobacco.    Post Discharge Medication Reconciliation Status: discharge medications reconciled and changed, per note/orders     Current Outpatient Medications   Medication Sig Dispense Refill    acetaminophen (TYLENOL) 325 MG tablet Take 325 mg by mouth daily      acetaminophen (TYLENOL) 325 MG tablet Take 325 mg by mouth 3 times daily as needed for mild pain      aspirin 81 MG EC tablet Take 1 tablet (81 mg) by mouth daily      diphenhydrAMINE (BENADRYL) 25 MG capsule Take 25 mg by mouth daily as needed for itching or allergies      Multiple Vitamins-Minerals (PRESERVISION AREDS 2) CAPS Take 2 capsules by mouth daily 180 capsule 3    sertraline (ZOLOFT) 50 MG tablet Take 1 tablet (50 mg) by mouth daily 90 tablet 1           Vitals:  /66   Pulse 76   Temp 98  F (36.7  C)   Resp 18   Ht 1.499 m (4' 11\")   Wt 45.8 kg (101 lb)   SpO2 96%   BMI 20.40 kg/m    Exam:  GENERAL APPEARANCE:  Alert, in no distress, thin, cooperative  ENT:  Mouth and posterior oropharynx normal, moist mucous membranes, mild [place]  EYES:  Conjunctiva and lids normal, eye patch over left eye  RESP:  respiratory effort and palpation of chest normal, lungs clear to auscultation , no respiratory distress  CV:  Palpation and auscultation of heart done , regular rate and rhythm, no murmur, rub, or gallop, no edema, +2 pedal pulses, " ZIOPATCH Left chest  ABDOMEN:  normal bowel sounds, soft, nontender, no hepatosplenomegaly or other masses  M/S:   THURMAN equally seen in bed  SKIN:  Inspection of skin and subcutaneous tissue baseline  NEURO:   Cranial nerves 2-12 are normal tested and grossly at patient's baseline  PSYCH:  oriented X 3, memory impaired , affect and mood normal, forgetful    Lab/Diagnostic data:  Recent Labs   Lab Test 04/08/24  1159 01/09/24  0520    136   POTASSIUM 4.9 3.9   CHLORIDE 104 106   CO2 23 22   ANIONGAP 10 8   GLC 86 86   BUN 18.4 17.1   CR 0.87 0.83   VONDA 9.9* 8.9    < > = values in this interval not displayed.     CBC RESULTS:   Recent Labs   Lab Test 04/08/24  1159   WBC 4.3   RBC 3.85   HGB 9.5*   HCT 30.9*   MCV 80   MCH 24.7*   MCHC 30.7*   RDW 14.2        ASSESSMENT / PLAN:  (R27.0) Ataxia, unspecified  (primary encounter diagnosis)  (R20.2) Paresthesia of left arm  (H53.2) Diplopia  (I63.9) Small vessel stroke (H)  (Z91.81) Personal history of fall  (R53.81) Physical deconditioning  Comment: asa, eye patch, watch Bps--nurses to call if >150 consistently. Has Ziopatch on--F/u card if nec. F/u neurophthalmology in May    (G30.9) Alzheimer's disease, unspecified (CODE) (H)  (F41.9) Anxiety disorder, unspecified type  Comment: cont with Zoloft--unclear why/if pt was off it briefly at one point in recent past-- no changes. Monitor.    (D64.9) Anemia, unspecified type  Comment: steady Hgb--likely related to chronic disease. Check prn as conditon warrants    (H35.3236) Exudative age-related macular degeneration, bilateral, with inactive scar (H)  Comment: decreased vision at baseline--now diplopia--monitor. Is a fall risk    (E21.3) Hyperparathyroidism, unspecified (H24)  Comment: on no meds, history --F/u with PCP        Total time spent with patient visit at the skilled nursing facility was >55 mins including patient visit, review of past records, and phone call to patient contact-tamia Sanchez  than 50% of total time spent with counseling and coordinating care.     Electronically signed by:  MARILU Simmons CNP

## 2024-04-11 NOTE — PROGRESS NOTES
Care Management Follow Up    Length of Stay (days): 3    Expected Discharge Date: 04/11/2024     Concerns to be Addressed: all concerns addressed in this encounter     Patient plan of care discussed at interdisciplinary rounds: Yes    Anticipated Discharge Disposition: Transitional Care     Anticipated Discharge Services: None  Anticipated Discharge DME: None    Patient/family educated on Medicare website which has current facility and service quality ratings: yes  Education Provided on the Discharge Plan: Yes  Patient/Family in Agreement with the Plan:      Referrals Placed by CM/SW: Post Acute Facilities  Private pay costs discussed:    Additional Information:  Patient is in need of more TCU choices as the two referrals do not have beds. Patient would want something close to her apartment. She wanted SW to speak with her daughter. Daughter prefers [place], [facility], or [facility]. Referrals sent.     LEXIE Hawkins, [facility]  Emergency Room   Please contact the SW on the floor in which the patient is staying for any questions or concerns

## 2024-04-11 NOTE — PLAN OF CARE
"Temp: 97.3  F (36.3  C) Temp src: Oral BP: (!) 153/72 Pulse: 84   Resp: 18 SpO2: 94 % O2 Device: None (Room air)       Orientation:  A&O x Self and family  VS: VSS  Pain:  Denies pain.  Tele:  SR  Activity: A1 GB Walker  Resp:  RA  GI:  Denies nausea and vomiting  : Voiding without difficulty   Skin:  WDL  Lines: PIV SL  Diet: Regular   Plan: SW consulted. Planning for TCU  Discharge:  Pending     Problem: Adult Inpatient Plan of Care  Goal: Plan of Care Review  Description: The Plan of Care Review/Shift note should be completed every shift.  The Outcome Evaluation is a brief statement about your assessment that the patient is improving, declining, or no change.  This information will be displayed automatically on your shift  note.  Outcome: Progressing  Flowsheets (Taken 4/10/2024 1905)  Plan of Care Reviewed With:   patient   child  Overall Patient Progress: improving  Goal: Patient-Specific Goal (Individualized)  Description: You can add care plan individualizations to a care plan. Examples of Individualization might be:  \"Parent requests to be called daily at 9am for status\", \"I have a hard time hearing out of my right ear\", or \"Do not touch me to wake me up as it startles  me\".  Outcome: Progressing  Goal: Absence of Hospital-Acquired Illness or Injury  Outcome: Progressing  Goal: Optimal Comfort and Wellbeing  Outcome: Progressing  Goal: Readiness for Transition of Care  Outcome: Progressing     Goal Outcome Evaluation:      Plan of Care Reviewed With: patient, child    Overall Patient Progress: improvingOverall Patient Progress: improving           "

## 2024-04-11 NOTE — PLAN OF CARE
"[id number]    VS /63 (BP Location: Right arm)   Pulse 84   Temp 98.3  F (36.8  C) (Oral)   Resp 18   Ht 1.499 m (4' 11\")   Wt 46 kg (101 lb 6.6 oz)   SpO2 94%   BMI 20.48 kg/m    Lung sounds clear  O2 room air  Tele SR  30 day heart monitor placed prior to discharge  Bowel sounds active  Tolerating regular diet  IVF IV removed prior to discharge  CMS double vision continues, one lens of glasses blocked out to help  Activity up with 1 assist, gait belt and walker. Unsteady gait, needs close assistance  Pain denies  Patient/family centered care multiple family members at bedside today, attentive to her needs and asking appropriate questions  Discharge plan discharge to TCU today with family transport.    Problem: Adult Inpatient Plan of Care  Goal: Plan of Care Review  Description: The Plan of Care Review/Shift note should be completed every shift.  The Outcome Evaluation is a brief statement about your assessment that the patient is improving, declining, or no change.  This information will be displayed automatically on your shift  note.  Outcome: Adequate for Care Transition  Flowsheets (Taken 4/11/2024 3706)  Outcome Evaluation: Improved status, double vision continues. Ambulation with A1, gait belt and walker. Dischaging today to TCU.  Plan of Care Reviewed With:   patient   family  Overall Patient Progress: improving  Goal: Patient-Specific Goal (Individualized)  Description: You can add care plan individualizations to a care plan. Examples of Individualization might be:  \"Parent requests to be called daily at 9am for status\", \"I have a hard time hearing out of my right ear\", or \"Do not touch me to wake me up as it startles  me\".  Outcome: Adequate for Care Transition  Goal: Absence of Hospital-Acquired Illness or Injury  Outcome: Adequate for Care Transition  Intervention: Identify and Manage Fall Risk  Recent Flowsheet Documentation  Taken 4/11/2024 5185 by Farzaneh Dodson RN  Safety Promotion/Fall " Prevention:   activity supervised   assistive device/personal items within reach   clutter free environment maintained   increased rounding and observation   increase visualization of patient   lighting adjusted   mobility aid in reach   nonskid shoes/slippers when out of bed   room door open   room organization consistent   safety round/check completed   supervised activity  Goal: Optimal Comfort and Wellbeing  Outcome: Adequate for Care Transition  Goal: Readiness for Transition of Care  Outcome: Adequate for Care Transition     Problem: Comorbidity Management  Goal: Blood Pressure in Desired Range  Outcome: Adequate for Care Transition  Intervention: Maintain Blood Pressure Management  Recent Flowsheet Documentation  Taken 4/11/2024 0920 by Farzaneh Dodson RN  Medication Review/Management: medications reviewed     Problem: Pain Acute  Goal: Optimal Pain Control and Function  Outcome: Adequate for Care Transition  Intervention: Prevent or Manage Pain  Recent Flowsheet Documentation  Taken 4/11/2024 0920 by Farzaneh Dodson RN  Medication Review/Management: medications reviewed   Goal Outcome Evaluation:      Plan of Care Reviewed With: patient, family    Overall Patient Progress: improvingOverall Patient Progress: improving    Outcome Evaluation: Improved status, double vision continues. Ambulation with A1, gait belt and walker. Dischaging today to TCU.

## 2024-04-11 NOTE — PLAN OF CARE
Physical Therapy Discharge Summary    Reason for therapy discharge:    Discharged to transitional care facility.    Progress towards therapy goal(s). See goals on Care Plan in [facility] electronic health record for goal details.  Goals not met.  Barriers to achieving goals:   discharge from facility.    Therapy recommendation(s):    Continued therapy is recommended.  Rationale/Recommendations:  Pt below baseline, currently Ax1 for transfers and amb. Pt very unstable, falls risk, limited by double vision. Currently recommend TCU to increased strength and improve balance.

## 2024-04-11 NOTE — PROGRESS NOTES
Care Management Discharge Note    Discharge Date: 04/11/2024       Discharge Disposition: Transitional Care    Discharge Services: None    Discharge DME: None    Discharge Transportation: family or friend will provide    Private pay costs discussed: transportation costs and insurance costs out of pocket expenses    Does the patient's insurance plan have a 3 day qualifying hospital stay waiver?  No    PAS Confirmation Code: [id number]  Patient/family educated on Medicare website which has current facility and service quality ratings: yes    Education Provided on the Discharge Plan: Yes  Persons Notified of Discharge Plans: Son, patient, Charge, TCU  Patient/Family in Agreement with the Plan:  yes    Handoff Referral Completed: Yes    Additional Information:  Patient accepted at [facility] for today.     Updated patient and son at bedside. Patient repeated questions multiple times.     Reviewed out of pocket cost for [employer] transport, $89.98 base and $5.79 per mile to the destination. Spoke with patient and son, they expressed understanding and are agreeable to this.       Ride time today from [id number].    Will fax orders once in.     PAS is done, see above.     Addendum 1418: ALL spoke with daughter Meka. She says she is the POA. Only [facility] is on file. Meka will transport. Ride canceled. Patient can leave around 1530.     LEXIE Hawkins, LGSW  Emergency Room   Please contact the ALL on the floor in which the patient is staying for any questions or concerns

## 2024-04-11 NOTE — PROGRESS NOTES
Patient discharged to [facility] TCU via private car with daughter, transported via wheelchair to front entrance with nursing staff.  Patient verbalized and received copy of discharge instructions.  Personal belongings gathered and sent with patient.  VSS. IV removed prior to discharge.

## 2024-04-11 NOTE — DISCHARGE SUMMARY
[facility]  Hospitalist Discharge Summary      Date of Admission:  4/8/2024  Date of Discharge:  4/11/2024  Discharging Provider: Edis Stoll MD, MD  Discharge Service: Hospitalist Service    Discharge Diagnoses   Left-sided numbness, Left-sided ataxia and Diplopia  High suspicion for MRI negative small vessel CVA  History of Alzheimer's dementia  Stable chronic anemia  History of anxiety    Clinically Significant Risk Factors          Follow-ups Needed After Discharge   Follow-up Appointments     Follow Up and recommended labs and tests      Follow up with Nursing home physician.    Follow-up with stroke neurology  Follow-up with neuro-ophthalmology            Unresulted Labs Ordered in the Past 30 Days of this Admission       No orders found from 3/9/2024 to 4/9/2024.            Discharge Disposition   Discharged to rehabilitation facility  Condition at discharge: Stable    Hospital Course     Continuing medicine service care today.  Seen and examined.  Chart reviewed.   As always Ms. Anders remained very pleasant and interactive.  I met her this morning while she is sitting comfortably in the hospital chair.  She is conversational, somewhat hard of hearing but following simple verbal instructions.  No significant reported issues overnight.  Afebrile.  Able to tolerate oral diet.  Not requiring  any oxygen support        Keysha Anders is a 89 year old female with a history of Anxiety, Cataract, Spinal Stenosis, Hyperparathyroidism, KISHA, Macular Degeneration, Thyroid Nodule, Osteoporosis, HTN, RLS, Alzheimer's Dementia who presented to the ED today after a fall overnight. When patient woke up this morning, patient was seeing double, hypertensive and unsteady. Left arm is tingly.       Pt presents from her facility with an unwitnessed fall overnight and two days of double vision.  She reports left arm tingling, but has also reported this in the past and wears a LUE brace for suspected  carpal tunnel.  She has a history of macular degeneration and cataracts.  Per ED report, was noted to have left sided ataxia with difficulty walking on admission prompting stoke work up.     Left-sided numbness, Left-sided ataxia and Diplopia  High suspicion for MRI negative small vessel CVA    -Presented with constellation of symptoms of left-sided numbness, with accompanying ataxia and diplopia    -Currently her mental state appears to be at baseline levels  -No reported new focal symptoms  -Stable diplopia  -Appreciate prompt and continuous input from stroke neurology  -Plans for cardiac event monitor upon discharge  -Will benefit in seeing neuro-ophthalmology and follow-up with stroke neurology  -Patient had a mildly elevated blood pressure levels earlier  -Currently normotensive  -No need for antihypertensive regimen for now  -Not requiring any maintenance regimen  -On aspirin 81 mg for secondary stroke prevention  -No further plans for repeat imaging as per stroke neurology  -Patient input from ancillary support services with PT, OT and SLP  -Remain on cardiac telemonitoring     -With her underlying deconditioned state therapy services recommending TCU placement  -Will request social service input for discharge planning disposition    Alzheimer's Dementia  Anxiety  Lives at [facility].    - continue Sertraline  -Mental state appears to be at baseline     Chronic Anemia  Hgb 9.5 is at baseline  Stable hemoglobin with no bleeding tendencies     CODE: DNR/DNI  Diet/IVF: regular  DVT ppx: scd    Consultations This Hospital Stay   NEUROLOGY IP STROKE CONSULT  SPEECH LANGUAGE PATH ADULT IP CONSULT  PHARMACY IP CONSULT  PHARMACY IP CONSULT  PHARMACY IP CONSULT  PHYSICAL THERAPY ADULT IP CONSULT  OCCUPATIONAL THERAPY ADULT IP CONSULT  REHAB ADMISSIONS LIAISON IP CONSULT  CARE MANAGEMENT / SOCIAL WORK IP CONSULT  CARE MANAGEMENT / SOCIAL WORK IP CONSULT  PHYSICAL THERAPY ADULT IP CONSULT  OCCUPATIONAL  THERAPY ADULT IP CONSULT  SMOKING CESSATION PROGRAM IP CONSULT    Code Status   No CPR- Do NOT Intubate    Time Spent on this Encounter   I, Edis Stoll MD, MD, personally saw the patient today and spent greater than 30 minutes discharging this patient.       Edis Stoll MD, MD  [place] [address] [facility]  [address] [place] [address]  Phone: [phone]  Fax: [phone]  ______________________________________________________________________    Physical Exam   Vital Signs: Temp: 98.3  F (36.8  C) Temp src: Oral BP: 130/63 Pulse: 84   Resp: 18 SpO2: 94 % O2 Device: None (Room air)    Weight: 101 lbs 6.59 oz  See same-day exam and same-day progress notes       Primary Care Physician   Joel Daniel Wegener    Discharge Orders      Adult Eye  Referral      General info for SNF    Length of Stay Estimate: Short Term Care: Estimated # of Days <30  Condition at Discharge: Improving  Level of care:skilled   Rehabilitation Potential: Good  Admission H&P remains valid and up-to-date: Yes  Recent Chemotherapy: N/A  Use Nursing Home Standing Orders: Yes     Mantoux instructions    Give two-step Mantoux (PPD) Per Facility Policy Yes     Follow Up and recommended labs and tests    Follow up with Nursing home physician.    Follow-up with stroke neurology  Follow-up with neuro-ophthalmology     Reason for your hospital stay    Ms. Chopra is a very pleasant 89-year-old lady with background history of dementia but pretty much still independent for her activities of daily living but has a presents in the hospital for symptomatology of diplopia, some lightheadedness and eventually found with high suspicion for underlying CVA with MRI negative findings.  She was felt that likely she has a microvascular CVA pathology and optimized from stroke neurology perspective.  Continued on stroke prevention approach with 81 mg of aspirin.  Statins was offered but patient and family  politely declined this approach.  No clear evidence of any cardiac arrhythmias here in the hospital.  She will be discharged on cardiac event monitor.  Currently demonstrating stable hemodynamics.  He is not requiring any antihypertensive.  Tolerating oral diet w currently she is ith no issues with swallowing and no aspiration like symptomatology.  She is not requiring any oxygen support.  Currently she is agreeable for TCU placement.     Activity - Up with nursing assistance     No CPR- Do NOT Intubate     Physical Therapy Adult Consult    evaluate and treat as clinically indicated.    Reason: Physical deconditioning     Occupational Therapy Adult Consult    Evaluate and treat as clinically indicated.    Reason: Physical deconditioning     Fall precautions     Diet    Follow this diet upon discharge: Orders Placed This Encounter      Combination Diet Regular Diet     Stroke Hospital Follow Up (for neurologist use only)    [employer] will call you to coordinate care as prescribed by your provider. If you don t hear from a representative within 2 business days, please call [phone].         Significant Results and Procedures   Most Recent 3 CBC's:  Recent Labs   Lab Test 04/08/24  1159 01/17/24  1610 01/09/24  0520   WBC 4.3 5.9 2.8*   HGB 9.5* 9.8* 8.7*   MCV 80 84 84    236 194     Most Recent 3 BMP's:  Recent Labs   Lab Test 04/09/24  1734 04/09/24  1139 04/09/24  0812 04/08/24  1914 04/08/24  1159 01/09/24  0520 01/08/24  1447   NA  --   --   --   --  137 136 136   POTASSIUM  --   --   --   --  4.9 3.9 4.6   CHLORIDE  --   --   --   --  104 106 104   CO2  --   --   --   --  23 22 24   BUN  --   --   --   --  18.4 17.1 21.7   CR  --   --   --   --  0.87 0.83 0.94   ANIONGAP  --   --   --   --  10 8 8   VONDA  --   --   --   --  9.9* 8.9 9.3   GLC 81 94 116*   < > 86 86 125*    < > = values in this interval not displayed.     Most Recent 2 LFT's:  Recent Labs   Lab Test 01/08/24  1447   AST 15    ALT 10   ALKPHOS 74   BILITOTAL 0.2     Most Recent 3 INR's:No lab results found.,   Results for orders placed or performed during the hospital encounter of 04/08/24   MRA Angiogram Head w/o Contrast    Narrative    MRI brain without and with contrast  MRA of the head without contrast  Neck MRA without and with contrast    Provided History:  double vision, dizziness, left sided numbness.    Comparison:  MRI 1/8/2024      Technique:   Brain MRI: Multiplanar multisequence brain MRI without and with  contrast.    Head MRA: 3D time-of-flight MRA of the [place] was performed  without intravenous contrast.  Neck MRA:  Limited non contrast 2DTOF images were obtained of the  mid-cervical region. Following intravenous gadolinium-based contrast  administration, a contrast enhanced MRA of the neck/cervical vessels  was performed. Three-dimensional reconstructions of the neck and head  MRA were created, which were reviewed by the radiologist.    Dose: 10 mL Gadavist    Findings:   Brain MRI:   Axial diffusion weighted images demonstrate no definite acute infarct.  Advanced leukoaraiosis. Mild to moderate diffuse cerebral volume loss.  There is no definite intracranial hemorrhage on susceptibility images.  Ventricles are proportionate to the cerebral sulci. Contrast-enhanced  images of the brain demonstrate no abnormal intra- or extra-axial  enhancement.    The visualized paranasal sinuses and mastoid air cells are clear.  Bilateral lens replacement.    Head MRA:  Patent major intracranial arteries. No definite aneurysm or stenosis.  Fetal origin bilateral PCA.    Neck MRA:  Patent major cervical arteries. Hypoplastic left vertebral artery. No  significant stenosis.       Impression    Impression:  1. No evidence of acute infarction or intracranial hemorrhage.  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   2. No abnormal enhancing lesions intracranially.  3. Head MRA demonstrates no definite aneurysm or  stenosis of the major  intracranial arteries.  4. Neck MRA demonstrates patent major cervical arteries.         PABLO NEGRON MD         SYSTEM ID:  [id number]   MRA Angiogram Neck w/o & w Contrast    Narrative    MRI brain without and with contrast  MRA of the head without contrast  Neck MRA without and with contrast    Provided History:  double vision, dizziness, left sided numbness.    Comparison:  MRI 1/8/2024      Technique:   Brain MRI: Multiplanar multisequence brain MRI without and with  contrast.    Head MRA: 3D time-of-flight MRA of the [place] was performed  without intravenous contrast.  Neck MRA:  Limited non contrast 2DTOF images were obtained of the  mid-cervical region. Following intravenous gadolinium-based contrast  administration, a contrast enhanced MRA of the neck/cervical vessels  was performed. Three-dimensional reconstructions of the neck and head  MRA were created, which were reviewed by the radiologist.    Dose: 10 mL Gadavist    Findings:   Brain MRI:   Axial diffusion weighted images demonstrate no definite acute infarct.  Advanced leukoaraiosis. Mild to moderate diffuse cerebral volume loss.  There is no definite intracranial hemorrhage on susceptibility images.  Ventricles are proportionate to the cerebral sulci. Contrast-enhanced  images of the brain demonstrate no abnormal intra- or extra-axial  enhancement.    The visualized paranasal sinuses and mastoid air cells are clear.  Bilateral lens replacement.    Head MRA:  Patent major intracranial arteries. No definite aneurysm or stenosis.  Fetal origin bilateral PCA.    Neck MRA:  Patent major cervical arteries. Hypoplastic left vertebral artery. No  significant stenosis.       Impression    Impression:  1. No evidence of acute infarction or intracranial hemorrhage.  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   2. No abnormal enhancing lesions intracranially.  3. Head MRA demonstrates no definite aneurysm or  stenosis of the major  intracranial arteries.  4. Neck MRA demonstrates patent major cervical arteries.         PABLO NEGRON MD         SYSTEM ID:  [id number]   MR Brain w/o & w Contrast    Narrative    MRI brain without and with contrast  MRA of the head without contrast  Neck MRA without and with contrast    Provided History:  double vision, dizziness, left sided numbness.    Comparison:  MRI 1/8/2024      Technique:   Brain MRI: Multiplanar multisequence brain MRI without and with  contrast.    Head MRA: 3D time-of-flight MRA of the [place] of [place] was performed  without intravenous contrast.  Neck MRA:  Limited non contrast 2DTOF images were obtained of the  mid-cervical region. Following intravenous gadolinium-based contrast  administration, a contrast enhanced MRA of the neck/cervical vessels  was performed. Three-dimensional reconstructions of the neck and head  MRA were created, which were reviewed by the radiologist.    Dose: 10 mL Gadavist    Findings:   Brain MRI:   Axial diffusion weighted images demonstrate no definite acute infarct.  Advanced leukoaraiosis. Mild to moderate diffuse cerebral volume loss.  There is no definite intracranial hemorrhage on susceptibility images.  Ventricles are proportionate to the cerebral sulci. Contrast-enhanced  images of the brain demonstrate no abnormal intra- or extra-axial  enhancement.    The visualized paranasal sinuses and mastoid air cells are clear.  Bilateral lens replacement.    Head MRA:  Patent major intracranial arteries. No definite aneurysm or stenosis.  Fetal origin bilateral PCA.    Neck MRA:  Patent major cervical arteries. Hypoplastic left vertebral artery. No  significant stenosis.       Impression    Impression:  1. No evidence of acute infarction or intracranial hemorrhage.  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   2. No abnormal enhancing lesions intracranially.  3. Head MRA demonstrates no definite aneurysm or stenosis of  the major  intracranial arteries.  4. Neck MRA demonstrates patent major cervical arteries.         PABLO NEGRON MD         SYSTEM ID:  [id number]   MR Brain w/o Contrast    Narrative    EXAM: MR BRAIN W/O CONTRAST  LOCATION: [facility]  DATE: 2024    INDICATION: per Stroke Neurology, recommend repeat Brain MRI with coronal DWI  COMPARISON: 2024. 2024  TECHNIQUE: Head MRI without IV contrast including axial and coronal diffusion weighted imaging,     FINDINGS:  INTRACRANIAL CONTENTS: No acute or subacute infarct. No obvious extra-axial collection. No midline shift. Mild to moderate generalized cerebral atrophy. No hydrocephalus.     OTHER: Accounting for technique no additional abnormalities identified.      Impression    IMPRESSION:  1.  No acute or subacute ischemic change.   Echocardiogram Complete - For age > 60 yrs     Value    LVEF  60-65%    Narrative    [id number]  [id number]  [id number]  [id number]^JUN^S     [facility]  Echocardiography Laboratory  [address] [place] [address]     Name: QUETA TERESA  MRN: [id number]  : 1929  Study Date: 2024 02:50 PM  Age: 89 yrs  Gender: Female  Patient Location: [facility]  Reason For Study: Cerebrovascular Incident  Ordering Physician: JUN HENDERSON  Referring Physician: Wegener, Joel Daniel Irwin  Performed By: Daysi Valencia     BSA: 1.4 m2  Height: 59 in  Weight: 104 lb  HR: 74  BP: 159/79 mmHg  ______________________________________________________________________________  Procedure  Complete Portable Echo Adult.  ______________________________________________________________________________  Interpretation Summary     Technically difficult imaging  A cardiac source of embolus was not identiied.  Sinus rhythm was noted.  Left ventricular systolic function is normal.  The visual ejection fraction is 60-65%.  The left ventricle is normal in size.  There is mild concentric left  ventricular hypertrophy.  The left atrium is mildly dilated.  There is mild (1+) aortic regurgitation.  There is mild (1+) mitral regurgitation.     No old studies for comparison  ______________________________________________________________________________  Left Ventricle  The left ventricle is normal in size. There is mild concentric left  ventricular hypertrophy. Left ventricular systolic function is normal. The  visual ejection fraction is 60-65%. Grade I or early diastolic dysfunction. No  regional wall motion abnormalities noted. There is no thrombus seen in the  left ventricle.     Right Ventricle  The right ventricle is normal in structure, function and size. There is no  mass or thrombus in the right ventricle.     Atria  The left atrium is mildly dilated. Right atrial size is normal. There is no  atrial shunt seen. Spontaneous contrast in left atrial appendage.     Mitral Valve  The mitral valve leaflets appear normal. There is no evidence of stenosis,  fluttering, or prolapse. There is mild (1+) mitral regurgitation. There is no  mitral valve stenosis.     Tricuspid Valve  Normal tricuspid valve. The right ventricular systolic pressure is elevated at  21.5 mmHg. Right ventricle systolic pressure estimate normal. There is mild  (1+) tricuspid regurgitation. There is no tricuspid stenosis.     Aortic Valve  There is mild trileaflet aortic sclerosis. There is mild (1+) aortic  regurgitation. No aortic stenosis is present.     Pulmonic Valve  Normal pulmonic valve. There is no pulmonic valvular regurgitation. There is  no pulmonic valvular stenosis.     Vessels  The aortic root is normal size. Normal size ascending aorta. The inferior vena  cava is normal. The pulmonary artery is normal size.     Pericardium  The pericardium appears normal. There is no pleural effusion.     Rhythm  Sinus rhythm was noted.  ______________________________________________________________________________  MMode/2D Measurements &  Calculations  IVSd: 1.2 cm     LVIDd: 3.6 cm  LVIDs: 2.0 cm  LVPWd: 1.2 cm  FS: 42.8 %  LV mass(C)d: 143.5 grams  LV mass(C)dI: 102.8 grams/m2  Ao root diam: 3.7 cm  LVOT diam: 2.0 cm  LVOT area: 3.1 cm2  Ao root diam index Ht(cm/m): 2.5  Ao root diam index BSA (cm/m2): 2.7  LA Volume (BP): 43.5 ml  LA Volume Index (BP): 31.1 ml/m2  RV Base: 3.0 cm     RWT: 0.67  TAPSE: 1.5 cm     Doppler Measurements & Calculations  PA V2 max: 126.0 cm/sec  PA max P.4 mmHg  TR max miah: 231.7 cm/sec  TR max P.5 mmHg  RV S Miah: 14.1 cm/sec     ______________________________________________________________________________  Report approved by: Dr. Stoney Heller 2024 03:44 PM             Discharge Medications   Current Discharge Medication List        START taking these medications    Details   aspirin 81 MG EC tablet Take 1 tablet (81 mg) by mouth daily    Associated Diagnoses: Double vision           CONTINUE these medications which have NOT CHANGED    Details   !! acetaminophen (TYLENOL) 325 MG tablet Take 325 mg by mouth daily      !! acetaminophen (TYLENOL) 325 MG tablet Take 325 mg by mouth 3 times daily as needed for mild pain    Associated Diagnoses: Spondylosis of lumbar region without myelopathy or radiculopathy      diphenhydrAMINE (BENADRYL) 25 MG capsule Take 25 mg by mouth daily as needed for itching or allergies      Multiple Vitamins-Minerals (PRESERVISION AREDS 2) CAPS Take 2 capsules by mouth daily  Qty: 180 capsule, Refills: 3    Associated Diagnoses: Exudative age-related macular degeneration of both eyes with inactive scar (H)      sertraline (ZOLOFT) 50 MG tablet Take 1 tablet (50 mg) by mouth daily  Qty: 90 tablet, Refills: 1    Associated Diagnoses: Current moderate episode of major depressive disorder without prior episode (H)       !! - Potential duplicate medications found. Please discuss with provider.        STOP taking these medications       ibuprofen (ADVIL/MOTRIN) 200 MG tablet  Comments:   Reason for Stopping:             Allergies   Allergies   Allergen Reactions    Dimethicone-Zinc Oxide Anaphylaxis    Seasonal Allergies     Penicillins Swelling, Itching and Rash     Other reaction(s): Edema

## 2024-04-12 ENCOUNTER — DOCUMENTATION ONLY (OUTPATIENT)
Dept: OTHER | Facility: CLINIC | Age: 89
End: 2024-04-12

## 2024-04-12 ENCOUNTER — TRANSITIONAL CARE UNIT VISIT (OUTPATIENT)
Dept: GERIATRICS | Facility: CLINIC | Age: 89
End: 2024-04-12
Payer: MEDICARE

## 2024-04-12 VITALS
RESPIRATION RATE: 18 BRPM | HEIGHT: 59 IN | DIASTOLIC BLOOD PRESSURE: 66 MMHG | HEART RATE: 76 BPM | WEIGHT: 101 LBS | BODY MASS INDEX: 20.36 KG/M2 | SYSTOLIC BLOOD PRESSURE: 123 MMHG | OXYGEN SATURATION: 96 % | TEMPERATURE: 98 F

## 2024-04-12 DIAGNOSIS — R20.2 PARESTHESIA OF LEFT ARM: ICD-10-CM

## 2024-04-12 DIAGNOSIS — R27.0 ATAXIA, UNSPECIFIED: Primary | ICD-10-CM

## 2024-04-12 DIAGNOSIS — F41.9 ANXIETY DISORDER, UNSPECIFIED TYPE: ICD-10-CM

## 2024-04-12 DIAGNOSIS — H35.3233: ICD-10-CM

## 2024-04-12 DIAGNOSIS — I63.9 SMALL VESSEL STROKE (H): ICD-10-CM

## 2024-04-12 DIAGNOSIS — Z91.81 PERSONAL HISTORY OF FALL: ICD-10-CM

## 2024-04-12 DIAGNOSIS — G30.9 ALZHEIMER'S DISEASE, UNSPECIFIED (CODE) (H): ICD-10-CM

## 2024-04-12 DIAGNOSIS — H53.2 DIPLOPIA: ICD-10-CM

## 2024-04-12 DIAGNOSIS — R53.81 PHYSICAL DECONDITIONING: ICD-10-CM

## 2024-04-12 DIAGNOSIS — E21.3 HYPERPARATHYROIDISM, UNSPECIFIED (H): ICD-10-CM

## 2024-04-12 DIAGNOSIS — D64.9 ANEMIA, UNSPECIFIED TYPE: ICD-10-CM

## 2024-04-12 PROCEDURE — 99310 SBSQ NF CARE HIGH MDM 45: CPT | Performed by: NURSE PRACTITIONER

## 2024-04-12 NOTE — PLAN OF CARE
Occupational Therapy Discharge Summary    Reason for therapy discharge:    Discharged to transitional care facility.    Progress towards therapy goal(s). See goals on Care Plan in [facility] electronic health record for goal details.  Goals partially met.  Barriers to achieving goals:   discharge from facility.    Therapy recommendation(s):    Continued therapy is recommended.  Rationale/Recommendations:  The patient presents below baseline. Typically able to move and complete basic ADL with no assist. Now requiring Ax1 for all transfers and ADL. Limited by vision, balance, and incoordination. Recommend continued OT while inpatient and at TCU.    Pt not seen by writer on this date. Note written based on previous treating OT's note and recommendations.

## 2024-04-15 NOTE — PROGRESS NOTES
"[place] [facility]  [place] Medical Record Number:  [id number]  Place of Service where encounter took place:  [facility] ([facility]) [[id number]]  Chief Complaint   Patient presents with    Nursing Home Acute       HPI:    Keysha Anders  is a 89 year old (12/31/1929), who is being seen today for an episodic care visit.  HPI information obtained from: facility chart records, facility staff, patient report, and [facility] chart review. Today's concern is: says still diplopia when patch off. Had lots of pertinent and cogent questions re: stroke, her vision, recovery--d/w pt.     Ataxia involving legs  Diplopia  Small vessel stroke (H)  Personal history of fall  Physical deconditioning  Alzheimer's disease, unspecified (CODE) (H)  Anxiety disorder, unspecified type  Anemia, unspecified type  Exudative age-related macular degeneration, bilateral, with inactive scar (H)     Past Medical and Surgical History reviewed in Epic today.    MEDICATIONS:     Current Outpatient Medications   Medication Sig Dispense Refill    acetaminophen (TYLENOL) 325 MG tablet Take 325 mg by mouth daily      acetaminophen (TYLENOL) 325 MG tablet Take 325 mg by mouth 3 times daily as needed for mild pain      aspirin 81 MG EC tablet Take 1 tablet (81 mg) by mouth daily      diphenhydrAMINE (BENADRYL) 25 MG capsule Take 25 mg by mouth daily as needed for itching or allergies      Multiple Vitamins-Minerals (PRESERVISION AREDS 2) CAPS Take 2 capsules by mouth daily 180 capsule 3    sertraline (ZOLOFT) 50 MG tablet Take 1 tablet (50 mg) by mouth daily 90 tablet 1     TODAY DURING EXAM/ROS:  No CP, SOB, Cough, fevers, chills, HA, N/V, dysuria or Bowel Abnormalities. Appetite is good.  No pain. Mild dizziness occas--knows not to get up by self.      Objective:  BP (!) 141/68   Pulse 70   Temp 97.8  F (36.6  C)   Resp 18   Ht 1.499 m (4' 11\")   Wt 46.1 kg (101 lb 9.6 oz)   SpO2 97%   BMI 20.52 kg/m  "   Exam:  GENERAL APPEARANCE:  Alert, in no distress, thin, cooperative  ENT:  Mouth with moist mucous membranes, mild [place]  EYES:  Conjunctiva and lids normal, eye patch over left eye  RESP:  respiratory effort  of chest normal, lungs  CTA. NAD  CV:  Auscultation of heart done , RRR, no murmur or edema, +2 pedal pulses, ZIOPATCH Left chest  ABDOMEN:  normal bowel sounds, soft, nontender  M/S:   THURMAN equally seen in bed sitting up  SKIN:  Inspection of skin and subcutaneous tissue baseline  NEURO:   Cranial nerves 2-12 are grossly  intact and at patient's baseline  PSYCH:  oriented X 3, memory impaired , affect and mood normal      Labs:   Recent Labs   Lab Test 04/08/24  1159 01/09/24  0520    136   POTASSIUM 4.9 3.9   CHLORIDE 104 106   CO2 23 22   ANIONGAP 10 8   GLC 86 86   BUN 18.4 17.1   CR 0.87 0.83   VONDA 9.9* 8.9    < > = values in this interval not displayed.   CBC RESULTS:   Recent Labs   Lab Test 04/08/24  1159   WBC 4.3   RBC 3.85   HGB 9.5*   HCT 30.9*   MCV 80   MCH 24.7*   MCHC 30.7*   RDW 14.2             ASSESSMENT / PLAN:  (R26.0) Ataxia involving legs  (primary encounter diagnosis)  (H53.2) Diplopia  (I63.9) Small vessel stroke (H)  (Z91.81) Personal history of fall  (R53.81) Physical deconditioning  Comment: cont PT OT, no HA, needs to wear eye patch, F/u neuro opthal in May. On asa.  Her SBP running 120-140's mostly, on no meds for that    (G30.9) Alzheimer's disease, unspecified (CODE) (H)  (F41.9) Anxiety disorder, unspecified type  Comment: cont Zoloft.  She had very clear inquisitive questions on what health, etc. The  cog scores pending with OT    (D64.9) Anemia, unspecified type  Comment: check prn as condition warrants.      (H35.9446) Exudative age-related macular degeneration, bilateral, with inactive scar (H)  Comment: eye vitamins    Electronically signed by:  MARILU Simmons CNP

## 2024-04-16 ENCOUNTER — TELEPHONE (OUTPATIENT)
Dept: FAMILY MEDICINE | Facility: CLINIC | Age: 89
End: 2024-04-16

## 2024-04-16 ENCOUNTER — TRANSITIONAL CARE UNIT VISIT (OUTPATIENT)
Dept: GERIATRICS | Facility: CLINIC | Age: 89
End: 2024-04-16
Payer: MEDICARE

## 2024-04-16 VITALS
WEIGHT: 101.6 LBS | OXYGEN SATURATION: 97 % | TEMPERATURE: 97.8 F | DIASTOLIC BLOOD PRESSURE: 68 MMHG | HEIGHT: 59 IN | SYSTOLIC BLOOD PRESSURE: 141 MMHG | BODY MASS INDEX: 20.48 KG/M2 | RESPIRATION RATE: 18 BRPM | HEART RATE: 70 BPM

## 2024-04-16 DIAGNOSIS — R53.81 PHYSICAL DECONDITIONING: ICD-10-CM

## 2024-04-16 DIAGNOSIS — H35.3233: ICD-10-CM

## 2024-04-16 DIAGNOSIS — R26.0 ATAXIA INVOLVING LEGS: Primary | ICD-10-CM

## 2024-04-16 DIAGNOSIS — D64.9 ANEMIA, UNSPECIFIED TYPE: ICD-10-CM

## 2024-04-16 DIAGNOSIS — I63.9 SMALL VESSEL STROKE (H): ICD-10-CM

## 2024-04-16 DIAGNOSIS — Z91.81 PERSONAL HISTORY OF FALL: ICD-10-CM

## 2024-04-16 DIAGNOSIS — F41.9 ANXIETY DISORDER, UNSPECIFIED TYPE: ICD-10-CM

## 2024-04-16 DIAGNOSIS — H53.2 DIPLOPIA: ICD-10-CM

## 2024-04-16 DIAGNOSIS — G30.9 ALZHEIMER'S DISEASE, UNSPECIFIED (CODE) (H): ICD-10-CM

## 2024-04-16 PROCEDURE — 99309 SBSQ NF CARE MODERATE MDM 30: CPT | Performed by: NURSE PRACTITIONER

## 2024-04-16 NOTE — TELEPHONE ENCOUNTER
Hospital/TCU/ED for chronic condition Discharge Protocol    Patient discharged to TCU.    Lola RAGSDALE RN

## 2024-04-19 ENCOUNTER — TRANSITIONAL CARE UNIT VISIT (OUTPATIENT)
Dept: GERIATRICS | Facility: CLINIC | Age: 89
End: 2024-04-19
Payer: MEDICARE

## 2024-04-19 VITALS
OXYGEN SATURATION: 98 % | BODY MASS INDEX: 20.5 KG/M2 | SYSTOLIC BLOOD PRESSURE: 124 MMHG | DIASTOLIC BLOOD PRESSURE: 75 MMHG | WEIGHT: 101.7 LBS | TEMPERATURE: 97 F | HEIGHT: 59 IN | HEART RATE: 63 BPM | RESPIRATION RATE: 16 BRPM

## 2024-04-19 DIAGNOSIS — G30.9 ALZHEIMER'S DISEASE, UNSPECIFIED (CODE) (H): ICD-10-CM

## 2024-04-19 DIAGNOSIS — I63.9 SMALL VESSEL STROKE (H): ICD-10-CM

## 2024-04-19 DIAGNOSIS — Z91.81 PERSONAL HISTORY OF FALL: ICD-10-CM

## 2024-04-19 DIAGNOSIS — F41.9 ANXIETY DISORDER, UNSPECIFIED TYPE: ICD-10-CM

## 2024-04-19 DIAGNOSIS — H35.3233: ICD-10-CM

## 2024-04-19 DIAGNOSIS — H53.2 DIPLOPIA: Primary | ICD-10-CM

## 2024-04-19 DIAGNOSIS — R53.81 PHYSICAL DECONDITIONING: ICD-10-CM

## 2024-04-19 PROCEDURE — 99308 SBSQ NF CARE LOW MDM 20: CPT | Performed by: NURSE PRACTITIONER

## 2024-04-19 NOTE — PROGRESS NOTES
"[place] [facility]  [place] Medical Record Number:  [id number]  Place of Service where encounter took place:  [facility] ([facility]) [[id number]]  Chief Complaint   Patient presents with    Nursing Home Acute       HPI:    Keysha Anders  is a 89 year old (12/31/1929), who is being seen today for an episodic care visit.  HPI information obtained from: facility chart records, facility staff, patient report, and [facility] chart review. Today's concern is:  diplopia when patch off--no dizziness.     Diplopia  Small vessel stroke (H)  Personal history of fall  Physical deconditioning  Alzheimer's disease, unspecified (CODE) (H)  Anxiety disorder, unspecified type  Exudative age-related macular degeneration, bilateral, with inactive scar (H)     Past Medical and Surgical History reviewed in Epic today.    MEDICATIONS:     Current Outpatient Medications   Medication Sig Dispense Refill    acetaminophen (TYLENOL) 325 MG tablet Take 650 mg by mouth daily      acetaminophen (TYLENOL) 325 MG tablet Take 650 mg by mouth every 6 hours as needed for mild pain      aspirin 81 MG EC tablet Take 1 tablet (81 mg) by mouth daily      diphenhydrAMINE (BENADRYL) 25 MG capsule Take 25 mg by mouth daily as needed for itching or allergies      Multiple Vitamins-Minerals (PRESERVISION AREDS 2) CAPS Take 2 capsules by mouth daily 180 capsule 3    sertraline (ZOLOFT) 50 MG tablet Take 1 tablet (50 mg) by mouth daily 90 tablet 1     TODAY DURING EXAM/ROS:  No CP, SOB, Cough, fevers, chills, HA, N/V, dysuria or Bowel Abnormalities. Appetite is good.  No pain. No c/o dizziness today.      Objective:  /75   Pulse 63   Temp 97  F (36.1  C)   Resp 16   Ht 1.499 m (4' 11\")   Wt 46.1 kg (101 lb 11.2 oz)   SpO2 98%   BMI 20.54 kg/m    Exam:  GENERAL APPEARANCE:  Alert, in no distress, thin, cooperative  ENT:  Mouth with moist mucous membranes, mild [place]  EYES:  Conjunctiva and lids normal, eye patch " over left eye  RESP:  respiratory effort  of chest normal, lungs clear posteriorly,   CV:  Auscultation of heart done , RRR, no murmur or edema, +2 pedal pulses, ZIOPATCH Left chest in place  ABDOMEN:  normal bowel sounds, soft, nontender  M/S:   THURMAN equally seen in bed sitting up  SKIN:  Inspection of skin at baseline  NEURO:   Cranial nerves are grossly  intact and at patient's baseline  PSYCH:  oriented X 3, memory impaired , affect and mood normal      Labs:   Recent Labs   Lab Test 04/08/24  1159 01/09/24  0520    136   POTASSIUM 4.9 3.9   CHLORIDE 104 106   CO2 23 22   ANIONGAP 10 8   GLC 86 86   BUN 18.4 17.1   CR 0.87 0.83   VONDA 9.9* 8.9    < > = values in this interval not displayed.   CBC RESULTS:   Recent Labs   Lab Test 04/08/24  1159   WBC 4.3   RBC 3.85   HGB 9.5*   HCT 30.9*   MCV 80   MCH 24.7*   MCHC 30.7*   RDW 14.2         ASSESSMENT / PLAN:  (H53.2) Diplopia  (primary encounter diagnosis)  (I63.9) Small vessel stroke (H)  (Z91.81) Personal history of fall  (R53.81) Physical deconditioning  Comment: cont PT OT,  wears eye patch, F/U neuro opthal in May. On asa.  Her SBP running 110-130's mainly.  On no meds. Will check when Ziopatch done.     (G30.9) Alzheimer's disease, unspecified (CODE) (H)  (F41.9) Anxiety disorder, unspecified type  Comment: cont Zoloft.  She had very clear inquisitive questions on what health, etc.      (H35.6433) Exudative age-related macular degeneration, bilateral, with inactive scar (H)  Comment: cont eye vitamins    Electronically signed by:  MARILU Simmons CNP

## 2024-04-22 VITALS
OXYGEN SATURATION: 93 % | SYSTOLIC BLOOD PRESSURE: 133 MMHG | RESPIRATION RATE: 20 BRPM | BODY MASS INDEX: 20.5 KG/M2 | HEIGHT: 59 IN | DIASTOLIC BLOOD PRESSURE: 78 MMHG | TEMPERATURE: 97.6 F | WEIGHT: 101.7 LBS | HEART RATE: 85 BPM

## 2024-04-23 ENCOUNTER — TRANSITIONAL CARE UNIT VISIT (OUTPATIENT)
Dept: GERIATRICS | Facility: CLINIC | Age: 89
End: 2024-04-23
Payer: MEDICARE

## 2024-04-23 DIAGNOSIS — I63.9 SMALL VESSEL STROKE (H): ICD-10-CM

## 2024-04-23 DIAGNOSIS — Z91.81 PERSONAL HISTORY OF FALL: ICD-10-CM

## 2024-04-23 DIAGNOSIS — G30.9 ALZHEIMER'S DISEASE, UNSPECIFIED (CODE) (H): ICD-10-CM

## 2024-04-23 DIAGNOSIS — H35.3233: ICD-10-CM

## 2024-04-23 DIAGNOSIS — R53.81 PHYSICAL DECONDITIONING: ICD-10-CM

## 2024-04-23 DIAGNOSIS — H53.2 DIPLOPIA: Primary | ICD-10-CM

## 2024-04-23 DIAGNOSIS — F41.9 ANXIETY DISORDER, UNSPECIFIED TYPE: ICD-10-CM

## 2024-04-23 PROCEDURE — 99308 SBSQ NF CARE LOW MDM 20: CPT | Performed by: NURSE PRACTITIONER

## 2024-04-23 NOTE — PROGRESS NOTES
"[place] [facility]  [place] Medical Record Number:  [id number]  Place of Service where encounter took place:  [facility] ([facility]) [[id number]]  Chief Complaint   Patient presents with    Nursing Home Acute       HPI:    Keysha Anders  is a 89 year old (12/31/1929), who is being seen today for an episodic care visit.  HPI information obtained from: facility chart records, facility staff, patient report, and [facility] chart review. Today's concern is:  diplopia when patch off but wearing patch so doing \"fine\".--no dizziness.     Diplopia  Small vessel stroke (H)  Personal history of fall  Physical deconditioning  Alzheimer's disease, unspecified (CODE) (H)  Anxiety disorder, unspecified type  Exudative age-related macular degeneration, bilateral, with inactive scar (H)     Past Medical and Surgical History reviewed in Epic today.    MEDICATIONS:     Current Outpatient Medications   Medication Sig Dispense Refill    acetaminophen (TYLENOL) 325 MG tablet Take 650 mg by mouth daily      acetaminophen (TYLENOL) 325 MG tablet Take 650 mg by mouth every 6 hours as needed for mild pain      aspirin 81 MG EC tablet Take 1 tablet (81 mg) by mouth daily      Multiple Vitamins-Minerals (PRESERVISION AREDS 2) CAPS Take 2 capsules by mouth daily 180 capsule 3    sertraline (ZOLOFT) 50 MG tablet Take 1 tablet (50 mg) by mouth daily 90 tablet 1     TODAY DURING EXAM/ROS:  No CP, SOB, Cough, fevers, chills, dizziness, HA, N/V, dysuria or Bowel Abnormalities. Appetite is good.  No pain.        Objective:  /78   Pulse 85   Temp 97.6  F (36.4  C)   Resp 20   Ht 1.499 m (4' 11\")   Wt 46.1 kg (101 lb 11.2 oz)   SpO2 93%   BMI 20.54 kg/m    Exam:  GENERAL APPEARANCE:  Alert, in no distress, thin, cooperative  ENT:  Mouth with moist mucous membranes, mild [place]  EYES:  Conjunctiva and lids normal, eye patch over left eye  RESP:  respiratory effort  of chest normal, lungs CTA  CV:  " Auscultation of heart done , RRR, no murmur or edema, +2 pedal pulses, ZIOPATCH on left chest.  ABDOMEN:  normal bowel sounds, soft, nontender  M/S:   THURMAN equally seen in chair eating BF  SKIN:  Inspection of skin at baseline  NEURO:   Cranial nerves are grossly  intact and at patient's baseline  PSYCH:  oriented X 3, memory impaired , affect and mood normal      Labs:   Recent Labs   Lab Test 04/08/24  1159 01/09/24  0520    136   POTASSIUM 4.9 3.9   CHLORIDE 104 106   CO2 23 22   ANIONGAP 10 8   GLC 86 86   BUN 18.4 17.1   CR 0.87 0.83   VONDA 9.9* 8.9    < > = values in this interval not displayed.   CBC RESULTS:   Recent Labs   Lab Test 04/08/24  1159   WBC 4.3   RBC 3.85   HGB 9.5*   HCT 30.9*   MCV 80   MCH 24.7*   MCHC 30.7*   RDW 14.2         ASSESSMENT / PLAN:  (H53.2) Diplopia  (primary encounter diagnosis)  (I63.9) Small vessel stroke (H)  (Z91.81) Personal history of fall  (R53.81) Physical deconditioning  Comment: cont PT OT,  Left eye patch, F/U neuro ophthalmology in May. On asa.  Her SBP running 1110-120's mainly--no meds.  Will Ziopatch done-- no info yet.    (G30.9) Alzheimer's disease, unspecified (CODE) (H)  (F41.9) Anxiety disorder, unspecified type  Comment: cont Zoloft. Calm, no changes, monitor.     (H35.4892) Exudative age-related macular degeneration, bilateral, with inactive scar (H)  Comment: cont eye vitamins    Electronically signed by:  MARILU Simmons CNP

## 2024-04-24 VITALS
HEART RATE: 74 BPM | DIASTOLIC BLOOD PRESSURE: 65 MMHG | WEIGHT: 101.8 LBS | HEIGHT: 59 IN | OXYGEN SATURATION: 98 % | BODY MASS INDEX: 20.52 KG/M2 | TEMPERATURE: 98 F | SYSTOLIC BLOOD PRESSURE: 120 MMHG | RESPIRATION RATE: 18 BRPM

## 2024-04-25 ENCOUNTER — TRANSITIONAL CARE UNIT VISIT (OUTPATIENT)
Dept: GERIATRICS | Facility: CLINIC | Age: 89
End: 2024-04-25
Payer: MEDICARE

## 2024-04-25 DIAGNOSIS — R53.81 PHYSICAL DECONDITIONING: ICD-10-CM

## 2024-04-25 DIAGNOSIS — I63.9 SMALL VESSEL STROKE (H): ICD-10-CM

## 2024-04-25 DIAGNOSIS — D64.9 ANEMIA, UNSPECIFIED TYPE: ICD-10-CM

## 2024-04-25 DIAGNOSIS — G30.9 ALZHEIMER'S DISEASE, UNSPECIFIED (CODE) (H): ICD-10-CM

## 2024-04-25 DIAGNOSIS — F41.9 ANXIETY DISORDER, UNSPECIFIED TYPE: ICD-10-CM

## 2024-04-25 DIAGNOSIS — H53.2 DIPLOPIA: Primary | ICD-10-CM

## 2024-04-25 DIAGNOSIS — Z91.81 PERSONAL HISTORY OF FALL: ICD-10-CM

## 2024-04-25 DIAGNOSIS — R26.0 ATAXIA INVOLVING LEGS: ICD-10-CM

## 2024-04-25 DIAGNOSIS — H35.3233: ICD-10-CM

## 2024-04-25 PROBLEM — F32.1 CURRENT MODERATE EPISODE OF MAJOR DEPRESSIVE DISORDER WITHOUT PRIOR EPISODE (H): Status: RESOLVED | Noted: 2023-03-17 | Resolved: 2024-04-25

## 2024-04-25 PROCEDURE — 99305 1ST NF CARE MODERATE MDM 35: CPT | Performed by: INTERNAL MEDICINE

## 2024-04-25 NOTE — LETTER
"    2024        RE: Keysha Anders  [address]  [place] [address]        Keysha Anders is a 89 year old female [facility] after [facility] hospitalization - following a fall secondary to acute ataxia and diplopia    She presented with left sided numbness and ataxia, and 2 days of double vision.   She has a h/o dementia, but mental status at baseline    Seen by Neurology and started on aspirin for MRI-negative stroke.   Stable and discharged to TCU for ongoing recovery and Rehab.     Today she is seen in her room up to chair.     Can't say where she lives, but used to live up [place],   in , had MS     \"Right now I'm confused about a lot of things.\"     Eating /sleeping well   Consistent low back pain for years, \"old, old injury\"   Occ radicular symptoms posterior right leg, not consistent  \"o/w I feel good.\"  [place]        PMH:    Macular degeneration, low vision   HTN  Hyperparathyroidism   Alzheimer's dementia  Cerebral vascular disease  Anxiety   RLS  Osteoporosis  Cerebral vascular disease  Diplopia  Ataxia    Social History     Tobacco Use     Smoking status: Former     Current packs/day: 0.00     Average packs/day: 0.3 packs/day for 66.0 years (19.8 ttl pk-yrs)     Types: Cigarettes     Start date:      Quit date: 2013     Years since quittin.3     Smokeless tobacco: Former   Substance Use Topics     Alcohol use: Not on file      SH:  Lives at [facility] [place] [facility], since 2024     Previously lived with her daughter     3 daughters and 2 sons, daughter Meka is first contact    Review Of Systems  Skin: negative   Eyes: impaired vision, diplopia if not wearing eye patch  Ears/Nose/Throat: hearing loss  Respiratory: No shortness of breath, dyspnea on exertion, cough, or hemoptysis  Cardiovascular: negative  Gastrointestinal: " "negative  Genitourinary: negative  Musculoskeletal: uses 4WW at baseline   Now transfers with min assist, CGA for bed mobility   Ambulates 150' with FWW and min assist    [place] 7   Needs assist for ADLs    EMS 4  Neurologic: SLUMS 16/30     Significant loss of balance and needs cuing for ambulation   Psychiatric: anxiety  Hematologic/Lymphatic/Immunologic: anemia  Endocrine: thyroid disorder      GENERAL APPEARANCE: alert and no distress  /65   Pulse 74   Temp 98  F (36.7  C)   Resp 18   Ht 1.499 m (4' 11\")   Wt 46.2 kg (101 lb 12.8 oz)   SpO2 98%   BMI 20.56 kg/m     HEENT: normocephalic, no lesion or abnormalities  NECK: no adenopathy, no asymmetry, masses, or scars and thyroid normal to palpation  RESP: lungs clear to auscultation - no rales, rhonchi or wheezes  CV: regular rate and rhythm, normal S1 S2, I/VI HSM  ABDOMEN:  soft, nontender, no HSM or masses and bowel sounds normal  MS: extremities normal- no extremity edema     SKIN: no suspicious lesions or rashes  NEURO: wearing eye patch, some confusion and repetitiveness  PSYCH: affect okay, pleasant   LYMPHATICS: No cervical,  or supraclavicular nodes     Lab Results   Component Value Date     04/08/2024    POTASSIUM 4.9 04/08/2024    CHLORIDE 104 04/08/2024    CO2 23 04/08/2024    ANIONGAP 10 04/08/2024    GLC 81 04/09/2024    BUN 18.4 04/08/2024    CR 0.87 04/08/2024    GFRESTIMATED 61 04/08/2024    VONDA 9.9 (H) 04/08/2024     Lab Results   Component Value Date    WBC 4.3 04/08/2024      HGB 9.5 04/08/2024      MCV 80 04/08/2024       04/08/2024     Lab Results   Component Value Date    A1C 5.5 04/08/2024 4/8/2024     Brain MRI:   Axial diffusion weighted images demonstrate no definite acute infarct.  Advanced leukoaraiosis. Mild to moderate diffuse cerebral volume loss.  There is no definite intracranial hemorrhage on susceptibility images.  Ventricles are proportionate to the cerebral sulci. Contrast-enhanced  images of " the brain demonstrate no abnormal intra- or extra-axial enhancement.  The visualized paranasal sinuses and mastoid air cells are clear.  Bilateral lens replacement.  Head MRA:  Patent major intracranial arteries. No definite aneurysm or stenosis.  Fetal origin bilateral PCA.  Neck MRA:  Patent major cervical arteries. Hypoplastic left vertebral artery. No significant stenosis.                                                     Impression:  1. No evidence of acute infarction or intracranial hemorrhage.  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   2. No abnormal enhancing lesions intracranially.  3. Head MRA demonstrates no definite aneurysm or stenosis of the major intracranial arteries.  4. Neck MRA demonstrates patent major cervical arteries.     ECHO 4/9/2024    A cardiac source of embolus was not identiied.  Sinus rhythm was noted.  Left ventricular systolic function is normal. The visual ejection fraction is 60-65%.  The left ventricle is normal in size. There is mild concentric left ventricular hypertrophy.  The left atrium is mildly dilated.  There is mild (1+) aortic regurgitation.  There is mild (1+) mitral regurgitation.      IMP/PLAN:   (H53.2) Diplopia    (I63.9) Small vessel stroke (H)  Comment: persistent over past 2 weeks   Plan: eye patch  Has a Ziopatch on  Aspirin 81 mg/day    Neuro-ophthalmology and Neurology follow up       (R26.0) Ataxia involving legs  (Z91.81) Personal history of fall  (R53.81) Physical deconditioning  Comment: unsteady gait, high fall risk, needs cuing for mobility   Plan: PHYSICAL THERAPY / OCCUPATIONAL THERAPY for balance, gait, ADLs  Discharge goal is return to [place]     (G30.9) Alzheimer's disease, unspecified (CODE) (H)  Comment: by history   Plan: supportive care     (D64.9) Anemia, unspecified type  Comment: hgb 8-9 range past few years      Microcytic   Plan: follow hgb, consider adding PPI to aspirin  Will need an outpatient workup     (F41.9) Anxiety  disorder, unspecified type  Comment: some relation to cognitive decline     Plan: sertraline 50 mg/day     (H35.3233) Exudative age-related macular degeneration, bilateral, with inactive scar (H)  Comment: limited vision at baseline   Plan: Preservision daily   Ophthalmology follow up      Roro Alegria MD       Sincerely,        Roro Alegria MD

## 2024-04-25 NOTE — PROGRESS NOTES
"Keysha Anders is a 89 year old female seen 2024 at [facility] TCU where she was admitted after [facility] hospitalization - following a fall secondary to acute ataxia and diplopia    She presented with left sided numbness and ataxia, and 2 days of double vision.   She has a h/o dementia, but mental status at baseline    Seen by Neurology and started on aspirin for MRI-negative stroke.   Stable and discharged to TCU for ongoing recovery and Rehab.     Today she is seen in her room up to chair.     Can't say where she lives, but used to live up [place],   in , had [place]     \"Right now I'm confused about a lot of things.\"     Eating /sleeping well   Consistent low back pain for years, \"old, old injury\"   Occ radicular symptoms posterior right leg, not consistent  \"o/w I feel good.\"  Thinks [place]        PMH:    Macular degeneration, low vision   HTN  Hyperparathyroidism   Alzheimer's dementia  Cerebral vascular disease  Anxiety   RLS  Osteoporosis  Cerebral vascular disease  Diplopia  Ataxia    Social History     Tobacco Use    Smoking status: Former     Current packs/day: 0.00     Average packs/day: 0.3 packs/day for 66.0 years (19.8 ttl pk-yrs)     Types: Cigarettes     Start date:      Quit date: 2013     Years since quittin.3    Smokeless tobacco: Former   Substance Use Topics    Alcohol use: Not on file      SH:  Lives at [facility], since 2024     Previously lived with her daughter     3 daughters and 2 sons, daughter Meka is first contact    Review Of Systems  Skin: negative   Eyes: impaired vision, diplopia if not wearing eye patch  Ears/Nose/Throat: hearing loss  Respiratory: No shortness of breath, dyspnea on exertion, cough, or hemoptysis  Cardiovascular: negative  Gastrointestinal: negative  Genitourinary: negative  Musculoskeletal: uses 4WW at baseline   Now transfers with " "min assist, CGA for bed mobility   Ambulates 150' with FWW and min assist    [place] 7   Needs assist for ADLs    EMS 4  Neurologic: SLUMS 16/30     Significant loss of balance and needs cuing for ambulation   Psychiatric: anxiety  Hematologic/Lymphatic/Immunologic: anemia  Endocrine: thyroid disorder      GENERAL APPEARANCE: alert and no distress  /65   Pulse 74   Temp 98  F (36.7  C)   Resp 18   Ht 1.499 m (4' 11\")   Wt 46.2 kg (101 lb 12.8 oz)   SpO2 98%   BMI 20.56 kg/m     HEENT: normocephalic, no lesion or abnormalities  NECK: no adenopathy, no asymmetry, masses, or scars and thyroid normal to palpation  RESP: lungs clear to auscultation - no rales, rhonchi or wheezes  CV: regular rate and rhythm, normal S1 S2, I/VI HSM  ABDOMEN:  soft, nontender, no HSM or masses and bowel sounds normal  MS: extremities normal- no extremity edema     SKIN: no suspicious lesions or rashes  NEURO: wearing eye patch, some confusion and repetitiveness  PSYCH: affect okay, pleasant   LYMPHATICS: No cervical,  or supraclavicular nodes     Lab Results   Component Value Date     04/08/2024    POTASSIUM 4.9 04/08/2024    CHLORIDE 104 04/08/2024    CO2 23 04/08/2024    ANIONGAP 10 04/08/2024    GLC 81 04/09/2024    BUN 18.4 04/08/2024    CR 0.87 04/08/2024    GFRESTIMATED 61 04/08/2024    VONDA 9.9 (H) 04/08/2024     Lab Results   Component Value Date    WBC 4.3 04/08/2024      HGB 9.5 04/08/2024      MCV 80 04/08/2024       04/08/2024     Lab Results   Component Value Date    A1C 5.5 04/08/2024 4/8/2024     Brain MRI:   Axial diffusion weighted images demonstrate no definite acute infarct.  Advanced leukoaraiosis. Mild to moderate diffuse cerebral volume loss.  There is no definite intracranial hemorrhage on susceptibility images.  Ventricles are proportionate to the cerebral sulci. Contrast-enhanced  images of the brain demonstrate no abnormal intra- or extra-axial enhancement.  The visualized paranasal " sinuses and mastoid air cells are clear.  Bilateral lens replacement.  Head MRA:  Patent major intracranial arteries. No definite aneurysm or stenosis.  Fetal origin bilateral PCA.  Neck MRA:  Patent major cervical arteries. Hypoplastic left vertebral artery. No significant stenosis.                                                     Impression:  1. No evidence of acute infarction or intracranial hemorrhage.  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   2. No abnormal enhancing lesions intracranially.  3. Head MRA demonstrates no definite aneurysm or stenosis of the major intracranial arteries.  4. Neck MRA demonstrates patent major cervical arteries.     ECHO 4/9/2024    A cardiac source of embolus was not identiied.  Sinus rhythm was noted.  Left ventricular systolic function is normal. The visual ejection fraction is 60-65%.  The left ventricle is normal in size. There is mild concentric left ventricular hypertrophy.  The left atrium is mildly dilated.  There is mild (1+) aortic regurgitation.  There is mild (1+) mitral regurgitation.      IMP/PLAN:   (H53.2) Diplopia    (I63.9) Small vessel stroke (H)  Comment: persistent over past 2 weeks   Plan: eye patch  Has a Ziopatch on  Aspirin 81 mg/day    Neuro-ophthalmology and Neurology follow up       (R26.0) Ataxia involving legs  (Z91.81) Personal history of fall  (R53.81) Physical deconditioning  Comment: unsteady gait, high fall risk, needs cuing for mobility   Plan: PHYSICAL THERAPY / OCCUPATIONAL THERAPY for balance, gait, ADLs  Discharge goal is return to [place]     (G30.9) Alzheimer's disease, unspecified (CODE) (H)  Comment: by history   Plan: supportive care     (D64.9) Anemia, unspecified type  Comment: hgb 8-9 range past few years      Microcytic   Plan: follow hgb, consider adding PPI to aspirin  Will need an outpatient workup     (F41.9) Anxiety disorder, unspecified type  Comment: some relation to cognitive decline     Plan: sertraline 50  mg/day     (H35.3233) Exudative age-related macular degeneration, bilateral, with inactive scar (H)  Comment: limited vision at baseline   Plan: Preservision daily   Ophthalmology follow up      Roro Alegria MD

## 2024-04-29 ENCOUNTER — TRANSITIONAL CARE UNIT VISIT (OUTPATIENT)
Dept: GERIATRICS | Facility: CLINIC | Age: 89
End: 2024-04-29
Payer: MEDICARE

## 2024-04-29 ENCOUNTER — LAB REQUISITION (OUTPATIENT)
Dept: LAB | Facility: CLINIC | Age: 89
End: 2024-04-29
Payer: MEDICARE

## 2024-04-29 VITALS
RESPIRATION RATE: 18 BRPM | BODY MASS INDEX: 20.52 KG/M2 | HEIGHT: 59 IN | DIASTOLIC BLOOD PRESSURE: 69 MMHG | HEART RATE: 78 BPM | OXYGEN SATURATION: 97 % | WEIGHT: 101.8 LBS | SYSTOLIC BLOOD PRESSURE: 120 MMHG | TEMPERATURE: 97.9 F

## 2024-04-29 DIAGNOSIS — D64.9 ANEMIA, UNSPECIFIED: ICD-10-CM

## 2024-04-29 DIAGNOSIS — R53.81 PHYSICAL DECONDITIONING: ICD-10-CM

## 2024-04-29 DIAGNOSIS — H53.2 DIPLOPIA: Primary | ICD-10-CM

## 2024-04-29 DIAGNOSIS — Z91.81 PERSONAL HISTORY OF FALL: ICD-10-CM

## 2024-04-29 DIAGNOSIS — I63.9 SMALL VESSEL STROKE (H): ICD-10-CM

## 2024-04-29 DIAGNOSIS — G30.9 ALZHEIMER'S DISEASE, UNSPECIFIED (CODE) (H): ICD-10-CM

## 2024-04-29 DIAGNOSIS — F41.9 ANXIETY DISORDER, UNSPECIFIED TYPE: ICD-10-CM

## 2024-04-29 DIAGNOSIS — E55.9 VITAMIN D DEFICIENCY, UNSPECIFIED: ICD-10-CM

## 2024-04-29 DIAGNOSIS — D50.9 IRON DEFICIENCY ANEMIA, UNSPECIFIED: ICD-10-CM

## 2024-04-29 DIAGNOSIS — H35.3233: ICD-10-CM

## 2024-04-29 PROCEDURE — 99308 SBSQ NF CARE LOW MDM 20: CPT | Performed by: NURSE PRACTITIONER

## 2024-04-29 NOTE — PROGRESS NOTES
"[place] [facility]  [facility] Record Number:  [id number]  Place of Service where encounter took place:  [facility] ([facility]) [[id number]]  Chief Complaint   Patient presents with    Nursing Home Acute       HPI:    Keysha Anders  is a 89 year old (12/31/1929), who is being seen today for an episodic care visit.  HPI information obtained from: facility chart records, facility staff, patient report, and [facility] chart review. Today's concern is:  no changes with vision--needs patch. Has no dizziness. Occas back discomfort but otherwise \"fine\".     Diplopia  Small vessel stroke (H)  Personal history of fall  Physical deconditioning  Alzheimer's disease, unspecified (CODE) (H)  Anxiety disorder, unspecified type  Exudative age-related macular degeneration, bilateral, with inactive scar (H)     Past Medical and Surgical History reviewed in Epic today.    MEDICATIONS:     Current Outpatient Medications   Medication Sig Dispense Refill    acetaminophen (TYLENOL) 325 MG tablet Take 650 mg by mouth daily      acetaminophen (TYLENOL) 325 MG tablet Take 650 mg by mouth every 6 hours as needed for mild pain      aspirin 81 MG EC tablet Take 1 tablet (81 mg) by mouth daily      Multiple Vitamins-Minerals (PRESERVISION AREDS 2) CAPS Take 2 capsules by mouth daily 180 capsule 3    sertraline (ZOLOFT) 50 MG tablet Take 1 tablet (50 mg) by mouth daily 90 tablet 1     TODAY DURING EXAM/ROS:  No CP, SOB, Cough, fevers, chills, dizziness, HA, N/V, dysuria or Bowel Abnormalities. Appetite is good.         Objective:  /69   Pulse 78   Temp 97.9  F (36.6  C)   Resp 18   Ht 1.499 m (4' 11\")   Wt 46.2 kg (101 lb 12.8 oz)   SpO2 97%   BMI 20.56 kg/m    Exam:  GENERAL APPEARANCE:  Alert, in no distress, thin, cooperative  ENT:  Mouth--moist mucous membranes, mild [place]  EYES:  Conjunctiva and lids normal, eye patch over left eye  RESP:  respiratory effort  of chest normal, lungs " clear  CV:  Auscultation of heart done , RRR, no murmur or edema, +2 pedal pulses,    ABDOMEN:  normal bowel sounds, soft, nontender  M/S:   THURMAN equally seen in chair after eating BF  SKIN:  Inspection of skin at baseline  NEURO:   Cranial nerves are grossly  intact and at patient's baseline  PSYCH:  oriented X 3, memory impaired , affect and mood normal      Labs:   Recent Labs   Lab Test 04/08/24  1159 01/09/24  0520    136   POTASSIUM 4.9 3.9   CHLORIDE 104 106   CO2 23 22   ANIONGAP 10 8   GLC 86 86   BUN 18.4 17.1   CR 0.87 0.83   VONDA 9.9* 8.9    < > = values in this interval not displayed.   CBC RESULTS:   Recent Labs   Lab Test 04/08/24  1159   WBC 4.3   RBC 3.85   HGB 9.5*   HCT 30.9*   MCV 80   MCH 24.7*   MCHC 30.7*   RDW 14.2         ASSESSMENT / PLAN:  (H53.2) Diplopia  (primary encounter diagnosis)  (I63.9) Small vessel stroke (H)  (Z91.81) Personal history of fall  (R53.81) Physical deconditioning  Comment: cont PT OT,  Left eye patch, F/U neuro ophthalmology in May. Cont asa.  Her SBP running 1110-120's mainly--no meds. Ziopatch done and being mailed out today for eval.--no LCD yet. Likely soon per ALL    (G30.9) Alzheimer's disease, unspecified (CODE) (H)  (F41.9) Anxiety disorder, unspecified type  Comment: cont Zoloft. Calm-- cont same POC,  monitor.     (H34.0656) Exudative age-related macular degeneration, bilateral, with inactive scar (H)  Comment: cont eye vitamins-no changes    Electronically signed by:  Lauren Morin, APRN CNP      ** Care CONF TODAY--Chg RN said family wondered re Vit D and her Anemia . Further review of [facility] and [facility] records: she had been on Fe gluc in the past as well as Vit D 50K weekly--unclear when stopped or why  Will check Hgb, Vit D and Iron levels in am**

## 2024-04-30 ENCOUNTER — TRANSITIONAL CARE UNIT VISIT (OUTPATIENT)
Dept: GERIATRICS | Facility: CLINIC | Age: 89
End: 2024-04-30
Payer: MEDICARE

## 2024-04-30 VITALS
HEIGHT: 59 IN | BODY MASS INDEX: 20.52 KG/M2 | OXYGEN SATURATION: 97 % | SYSTOLIC BLOOD PRESSURE: 124 MMHG | WEIGHT: 101.8 LBS | RESPIRATION RATE: 18 BRPM | DIASTOLIC BLOOD PRESSURE: 68 MMHG | HEART RATE: 90 BPM | TEMPERATURE: 97.2 F

## 2024-04-30 DIAGNOSIS — D50.9 IRON DEFICIENCY ANEMIA, UNSPECIFIED IRON DEFICIENCY ANEMIA TYPE: Primary | ICD-10-CM

## 2024-04-30 DIAGNOSIS — E55.9 VITAMIN D DEFICIENCY: ICD-10-CM

## 2024-04-30 DIAGNOSIS — D64.9 ANEMIA, UNSPECIFIED TYPE: ICD-10-CM

## 2024-04-30 LAB
HGB BLD-MCNC: 8.6 G/DL (ref 11.7–15.7)
IRON SERPL-MCNC: 25 UG/DL (ref 37–145)
VIT D+METAB SERPL-MCNC: 23 NG/ML (ref 20–50)

## 2024-04-30 PROCEDURE — 36415 COLL VENOUS BLD VENIPUNCTURE: CPT | Performed by: NURSE PRACTITIONER

## 2024-04-30 PROCEDURE — 85018 HEMOGLOBIN: CPT | Performed by: NURSE PRACTITIONER

## 2024-04-30 PROCEDURE — 82306 VITAMIN D 25 HYDROXY: CPT | Performed by: NURSE PRACTITIONER

## 2024-04-30 PROCEDURE — 83540 ASSAY OF IRON: CPT | Performed by: NURSE PRACTITIONER

## 2024-04-30 PROCEDURE — P9604 ONE-WAY ALLOW PRORATED TRIP: HCPCS | Performed by: NURSE PRACTITIONER

## 2024-04-30 PROCEDURE — 99308 SBSQ NF CARE LOW MDM 20: CPT | Performed by: NURSE PRACTITIONER

## 2024-04-30 RX ORDER — FERROUS GLUCONATE 324(38)MG
324 TABLET ORAL
COMMUNITY
Start: 2024-04-30 | End: 2024-05-21

## 2024-04-30 RX ORDER — SWAB
1 SWAB, NON-MEDICATED MISCELLANEOUS DAILY
COMMUNITY
Start: 2024-05-01

## 2024-04-30 RX ORDER — ASCORBIC ACID 250 MG
250 TABLET,CHEWABLE ORAL
COMMUNITY
Start: 2024-04-30 | End: 2024-05-17

## 2024-04-30 RX ORDER — CALCIUM CARBONATE 500 MG/1
1 TABLET, CHEWABLE ORAL 2 TIMES DAILY
COMMUNITY
Start: 2024-05-01 | End: 2024-05-17

## 2024-04-30 NOTE — PROGRESS NOTES
"CC: Lab Recheck     HPI:    Keysha Anders is a 89 year old  (12/31/1929), who is being seen today for an episodic care visit at [facility] ([facility] Today's concern: lab recheck of Hgb, vit d and iron level.      Iron deficiency anemia, unspecified iron deficiency anemia type  Anemia, unspecified type  Vitamin D deficiency       SUBJECTIVE/OBJECTIVE: No C/O's, NAD. Sitting in chair in room  /68   Pulse 90   Temp 97.2  F (36.2  C)   Resp 18   Ht 1.499 m (4' 11\")   Wt 46.2 kg (101 lb 12.8 oz)   SpO2 97%   BMI 20.56 kg/m      Current Outpatient Medications   Medication Sig Dispense Refill    ascorbic acid (VITAMIN C) 250 MG CHEW chewable tablet Take 250 mg by mouth three times a week With the Fe Gluc      [START ON 5/1/2024] calcium carbonate (TUMS) 500 MG chewable tablet Take 1 chew tab by mouth 2 times daily For supplementation      ferrous gluconate (FERGON) 324 (38 Fe) MG tablet Take 324 mg by mouth three times a week With vitamin C 250mg three times weekly      [START ON 5/1/2024] vitamin D3 (CHOLECALCIFEROL) 10 MCG (400 UNIT) capsule Take 1 capsule by mouth daily      acetaminophen (TYLENOL) 325 MG tablet Take 650 mg by mouth daily      acetaminophen (TYLENOL) 325 MG tablet Take 650 mg by mouth every 6 hours as needed for mild pain      aspirin 81 MG EC tablet Take 1 tablet (81 mg) by mouth daily      Multiple Vitamins-Minerals (PRESERVISION AREDS 2) CAPS Take 2 capsules by mouth daily 180 capsule 3    sertraline (ZOLOFT) 50 MG tablet Take 1 tablet (50 mg) by mouth daily 90 tablet 1       LABS: today   Hemoglobin   Date Value Ref Range Status   04/30/2024 8.6 (L) 11.7 - 15.7 g/dL Final   04/08/2024 9.5 (L) 11.7 - 15.7 g/dL Final     Ferritin   Date Value Ref Range Status   01/08/2024 25 11 - 328 ng/mL Final     Iron   Date Value Ref Range Status   04/30/2024 25 (L) 37 - 145 ug/dL Final     Iron Binding Capacity   Date Value Ref Range Status   01/08/2024 362 240 - 430 " ug/dL Final   Vitamin D==23    ASSESSMENT / PLAN:  (D50.9) Iron deficiency anemia, unspecified iron deficiency anemia type  (primary encounter diagnosis)  (D64.9) Anemia, unspecified type  Comment: Hgb down a bit--also low Fe levels--will add fe gluc with vit c thrice weekly. Check again in 2-3 mos or prn    (E55.9) Vitamin D deficiency  Comment: low normal at 23--add TUMS and Vit D  Plan: check in 6-12 mos,       Electronically Signed by:    Lauren Morin RN, CNP

## 2024-05-02 ENCOUNTER — TRANSITIONAL CARE UNIT VISIT (OUTPATIENT)
Dept: GERIATRICS | Facility: CLINIC | Age: 89
End: 2024-05-02
Payer: MEDICARE

## 2024-05-02 VITALS
HEIGHT: 59 IN | HEART RATE: 87 BPM | DIASTOLIC BLOOD PRESSURE: 60 MMHG | OXYGEN SATURATION: 100 % | RESPIRATION RATE: 18 BRPM | SYSTOLIC BLOOD PRESSURE: 127 MMHG | TEMPERATURE: 97.4 F | BODY MASS INDEX: 20.52 KG/M2 | WEIGHT: 101.8 LBS

## 2024-05-02 DIAGNOSIS — R26.0 ATAXIA INVOLVING LEGS: ICD-10-CM

## 2024-05-02 DIAGNOSIS — H53.2 DIPLOPIA: Primary | ICD-10-CM

## 2024-05-02 DIAGNOSIS — I63.9 SMALL VESSEL STROKE (H): ICD-10-CM

## 2024-05-02 DIAGNOSIS — F41.9 ANXIETY DISORDER, UNSPECIFIED TYPE: ICD-10-CM

## 2024-05-02 DIAGNOSIS — D50.9 IRON DEFICIENCY ANEMIA, UNSPECIFIED IRON DEFICIENCY ANEMIA TYPE: ICD-10-CM

## 2024-05-02 DIAGNOSIS — Z91.81 PERSONAL HISTORY OF FALL: ICD-10-CM

## 2024-05-02 DIAGNOSIS — R53.81 PHYSICAL DECONDITIONING: ICD-10-CM

## 2024-05-02 DIAGNOSIS — G30.9 ALZHEIMER'S DISEASE, UNSPECIFIED (CODE) (H): ICD-10-CM

## 2024-05-02 PROCEDURE — 99309 SBSQ NF CARE MODERATE MDM 30: CPT

## 2024-05-02 NOTE — PROGRESS NOTES
"[facility]    Chief Complaint   Patient presents with    RECHECK     HPI:  Keysha Anders is a 89 year old  (12/31/1929), who is being seen today for an episodic care visit at: [facility] ([facility]) [[id number]]. Today's concern is: Episodic care visit     Today's Visit:  Patient was seen today in her room while setting up in chair. She just finished  her lunch and watching TV. She seems to [place]  She has a plan to discharge next week to her previous AL.  Starting a PPI today given her recent initiation of oral iron.  Her hemoglobin chronically low will recheck next week.  She possibly have chronic illness anemia.  She denies generalized pain. chest pain, shortness of breath, dizziness, nausea and vomiting, any acute issue.  History obtained from: facility chart records, facility staff, patient report, [facility] chart review, and Care Everywhere [facility] chart review         Allergies, and PMH/PSH reviewed in [facility] today.  REVIEW OF SYSTEMS:  4 point ROS including Respiratory, CV, GI and , other than that noted in the HPI,  is negative    Objective:   /60   Pulse 87   Temp 97.4  F (36.3  C)   Resp 18   Ht 1.499 m (4' 11\")   Wt 46.2 kg (101 lb 12.8 oz)   SpO2 100%   BMI 20.56 kg/m    GENERAL APPEARANCE:  Alert,  pleasantly confused   ENT:  Mouth and posterior oropharynx normal, moist mucous membranes, [place]  EYES:  EOM, conjunctivae, lids, pupils and irises normal, eye patches to avoid double vision.  M/S:   Ambulating with physical therapy and working on strengthening exercises  SKIN:  No new issue identified    Most Recent 3 CBC's:  Recent Labs   Lab Test 04/30/24  0719 04/08/24  1159 01/17/24  1610 01/09/24  0520   WBC  --  4.3 5.9 2.8*   HGB 8.6* 9.5* 9.8* 8.7*   MCV  --  80 84 84   PLT  --  249 236 194     Ferritin   Date Value Ref Range Status   01/08/2024 25 11 - 328 ng/mL Final     Iron   Date Value Ref Range " Status   04/30/2024 25 (L) 37 - 145 ug/dL Final     Iron Binding Capacity   Date Value Ref Range Status   01/08/2024 362 240 - 430 ug/dL Final       Assessment/Plan:  (H53.2) Diplopia  (primary encounter diagnosis)  (I63.9) Small vessel stroke (H)  Comments: Chronic issue persistent over last 2 years no new issue noted.  Plan:   -She has a Zio patch on  -She wears an eye patch that is helping with double vision  - ASA 81 mg daily  - She has neuro and ophthalmology follow-up      (R26.0) Ataxia involving legs  (Z91.81) Personal history of fall  (R53.81) Physical deconditioning  Comment: Chronic issue-unsteady gait and frequent falls she will need continuous assistance.  Plan:   -PT /OT for balance and gait training ADLs  -Plan to discharge to AL next week will need PT/OT    (D50.9) Iron deficiency anemia, unspecified iron deficiency anemia type  Comment: Acute on chronic hemoglobin baseline 8-9 currently taking iron  Plan:   -Continue current iron supplement  -Labs ordered for 5/6, iron and hemoglobin  -Ordered Prilosec 20 mg daily for 14 days  -Monitor bowel habits      (F41.9) Anxiety disorder, unspecified type  (G30.9) Alzheimer's disease, unspecified (CODE) (H)  Comment: Chronic issue due to cognitive decline.  Plan:   -Continue sertraline daily  -Monitor for behavioral changes    MED REC REQUIRED  Post Medication Reconciliation Status: discharge medications reconciled and changed, per note/orders        Electronically signed by:MARILU uTrpin CNP

## 2024-05-02 NOTE — LETTER
"    5/2/2024        RE: Keysha Anders  [address]  [place] [address]        [facility] GERIATRICS    Chief Complaint   Patient presents with     RECHECK     HPI:  Keysha Anders is a 89 year old  (12/31/1929), who is being seen today for an episodic care visit at: [facility] ([facility]) [[id number]]. Today's concern is: Episodic care visit     Today's Visit:  Patient was seen today in her room while setting up in chair. She just finished  her lunch and watching TV. She seems to think [place] will [place]  She has a plan to discharge next week to her previous AL.  Starting a PPI today given her recent initiation of oral iron.  Her hemoglobin chronically low will recheck next week.  She possibly have chronic illness anemia.  She denies generalized pain. chest pain, shortness of breath, dizziness, nausea and vomiting, any acute issue.  History obtained from: facility chart records, facility staff, patient report, [facility] chart review, and Care Everywhere [facility] chart review         Allergies, and PMH/PSH reviewed in [facility] today.  REVIEW OF SYSTEMS:  4 point ROS including Respiratory, CV, GI and , other than that noted in the HPI,  is negative    Objective:   /60   Pulse 87   Temp 97.4  F (36.3  C)   Resp 18   Ht 1.499 m (4' 11\")   Wt 46.2 kg (101 lb 12.8 oz)   SpO2 100%   BMI 20.56 kg/m    GENERAL APPEARANCE:  Alert,  pleasantly confused   ENT:  Mouth and posterior oropharynx normal, moist mucous membranes, [place]  EYES:  EOM, conjunctivae, lids, pupils and irises normal, eye patches to avoid double vision.  M/S:   Ambulating with physical therapy and working on strengthening exercises  SKIN:  No new issue identified    Most Recent 3 CBC's:  Recent Labs   Lab Test 04/30/24  0719 04/08/24  1159 01/17/24  1610 01/09/24  0520   WBC  --  4.3 5.9 2.8*   HGB 8.6* 9.5* 9.8* 8.7*   MCV  --  80 84 84   PLT  --  249 236 194     Ferritin   Date Value " Ref Range Status   01/08/2024 25 11 - 328 ng/mL Final     Iron   Date Value Ref Range Status   04/30/2024 25 (L) 37 - 145 ug/dL Final     Iron Binding Capacity   Date Value Ref Range Status   01/08/2024 362 240 - 430 ug/dL Final       Assessment/Plan:  (H53.2) Diplopia  (primary encounter diagnosis)  (I63.9) Small vessel stroke (H)  Comments: Chronic issue persistent over last 2 years no new issue noted.  Plan:   -She has a Zio patch on  -She wears an eye patch that is helping with double vision  - ASA 81 mg daily  - She has neuro and ophthalmology follow-up      (R26.0) Ataxia involving legs  (Z91.81) Personal history of fall  (R53.81) Physical deconditioning  Comment: Chronic issue-unsteady gait and frequent falls she will need continuous assistance.  Plan:   -PT /OT for balance and gait training ADLs  -Plan to discharge to AL next week will need PT/OT    (D50.9) Iron deficiency anemia, unspecified iron deficiency anemia type  Comment: Acute on chronic hemoglobin baseline 8-9 currently taking iron  Plan:   -Continue current iron supplement  -Labs ordered for 5/6, iron and hemoglobin  -Ordered Prilosec 20 mg daily for 14 days  -Monitor bowel habits      (F41.9) Anxiety disorder, unspecified type  (G30.9) Alzheimer's disease, unspecified (CODE) (H)  Comment: Chronic issue due to cognitive decline.  Plan:   -Continue sertraline daily  -Monitor for behavioral changes    MED REC REQUIRED  Post Medication Reconciliation Status: discharge medications reconciled and changed, per note/orders        Electronically signed by:MARILU Turpin CNP         Sincerely,        MARILU Turpin CNP

## 2024-05-03 ENCOUNTER — TRANSITIONAL CARE UNIT VISIT (OUTPATIENT)
Dept: GERIATRICS | Facility: CLINIC | Age: 89
End: 2024-05-03
Payer: MEDICARE

## 2024-05-03 VITALS
SYSTOLIC BLOOD PRESSURE: 125 MMHG | BODY MASS INDEX: 20.52 KG/M2 | HEART RATE: 92 BPM | WEIGHT: 101.8 LBS | HEIGHT: 59 IN | OXYGEN SATURATION: 99 % | RESPIRATION RATE: 18 BRPM | TEMPERATURE: 98.3 F | DIASTOLIC BLOOD PRESSURE: 72 MMHG

## 2024-05-03 DIAGNOSIS — F41.9 ANXIETY DISORDER, UNSPECIFIED TYPE: ICD-10-CM

## 2024-05-03 DIAGNOSIS — E55.9 VITAMIN D DEFICIENCY: ICD-10-CM

## 2024-05-03 DIAGNOSIS — R53.81 PHYSICAL DECONDITIONING: ICD-10-CM

## 2024-05-03 DIAGNOSIS — I63.9 SMALL VESSEL STROKE (H): Primary | ICD-10-CM

## 2024-05-03 DIAGNOSIS — H53.2 DOUBLE VISION: ICD-10-CM

## 2024-05-03 DIAGNOSIS — G30.9 ALZHEIMER'S DISEASE, UNSPECIFIED (CODE) (H): ICD-10-CM

## 2024-05-03 DIAGNOSIS — H35.3233 EXUDATIVE AGE-RELATED MACULAR DEGENERATION OF BOTH EYES WITH INACTIVE SCAR (H): ICD-10-CM

## 2024-05-03 DIAGNOSIS — R27.0 ATAXIA, UNSPECIFIED: ICD-10-CM

## 2024-05-03 DIAGNOSIS — E21.3 HYPERPARATHYROIDISM (H): ICD-10-CM

## 2024-05-03 DIAGNOSIS — M48.061 SPINAL STENOSIS OF LUMBAR REGION WITHOUT NEUROGENIC CLAUDICATION: ICD-10-CM

## 2024-05-03 DIAGNOSIS — D64.9 ANEMIA, UNSPECIFIED TYPE: ICD-10-CM

## 2024-05-03 DIAGNOSIS — D50.9 IRON DEFICIENCY ANEMIA, UNSPECIFIED IRON DEFICIENCY ANEMIA TYPE: ICD-10-CM

## 2024-05-03 PROCEDURE — 99316 NF DSCHRG MGMT 30 MIN+: CPT | Performed by: NURSE PRACTITIONER

## 2024-05-03 NOTE — PATIENT INSTRUCTIONS
[facility] Discharge Orders    Name: Keysha Anders  : 1929  Planned Discharge Date: 2024  Discharged to: new assisted living for patient--[facility]    MEDICAL FOLLOW UP  Follow up with PCP in 1-2 weeks either current PCP or [facility] team there with Dr Roro Alegria and Demian Oropeza NP--family to decide  Follow up with Specialists: Neuro ophthalmology  Dr Jacobs 2024 [facility]    FUTURE LABS: Hgb due 2-3 weeks with results to PCP    ORDER CHANGES:  New meds: TUMS<,Vit D, PPI, Fegluc, Vit C    DISCHARGE MEDICATIONS:  The patient s pharmacy is authorized to dispense a 30-day supply of medications. Refill requests should be directed to the primary provider, Wegener, Joel Daniel Irwin .   No narcotics are prescribed at time of discharge.   Current Outpatient Medications   Medication Sig Dispense Refill    acetaminophen (TYLENOL) 325 MG tablet Take 650 mg by mouth daily      ascorbic acid (VITAMIN C) 250 MG CHEW chewable tablet Take 250 mg by mouth three times a week With the Fe Gluc      aspirin 81 MG EC tablet Take 1 tablet (81 mg) by mouth daily      calcium carbonate (TUMS) 500 MG chewable tablet Take 1 chew tab by mouth 2 times daily For supplementation      ferrous gluconate (FERGON) 324 (38 Fe) MG tablet Take 324 mg by mouth three times a week With vitamin C 250mg three times weekly      Multiple Vitamins-Minerals (PRESERVISION AREDS 2) CAPS Take 2 capsules by mouth daily 180 capsule 3    omeprazole (PRILOSEC) 20 MG DR capsule Take 1 capsule (20 mg) by mouth daily      sertraline (ZOLOFT) 50 MG tablet Take 1 tablet (50 mg) by mouth daily 90 tablet 1    vitamin D3 (CHOLECALCIFEROL) 10 MCG (400 UNIT) capsule Take 1 capsule by mouth daily         SERVICES:  Home Care:  Occupational Therapy, Physical Therapy, Home Health Aide, and From:  [facility]    ADDITIONAL INSTRUCTIONS:wear left eye patch to control diplopia    MARILU Simmons CNP  This document was  electronically signed on May 3, 2024

## 2024-05-03 NOTE — PROGRESS NOTES
[place] [facility] DISCHARGE SUMMARY    PATIENT'S NAME: Keysha Anders  YOB: 1929    PRIMARY CARE PROVIDER AND CLINIC RESPONSIBLE AFTER TRANSFER: Wegener, Joel Daniel Irwin     CODE STATUS:DNR/DNI    TRANSFERRING PROVIDERS: Lauren Morin, APRN CNP, Roro Alegria MD      DATE OF SNF ADMISSION:  April / 11 / 2024    DATE OF SNF DISCHARGE (including anticipating DC): May / 07 /  2024    DISCHARGE DISPOSITION: [facility] Provider    Nursing Facility: [facility] stay 4/8 to 4/11/24.     Condition on Discharge:  Stable.    FUNCTION/REHAB NOTES:         DISCHARGE DIAGNOSIS:      Small vessel stroke (H)  Ataxia, unspecified  Physical deconditioning  Double vision  Iron deficiency anemia, unspecified iron deficiency anemia type  Anemia, unspecified type  Vitamin D deficiency  Alzheimer's disease, unspecified (CODE) (H)  Hyperparathyroidism (H24)  Anxiety disorder, unspecified type  Exudative age-related macular degeneration of both eyes with inactive scar (H)  Spinal stenosis of lumbar region without neurogenic claudication      HOSPITAL COURSE:  This jolynn pt had a numb feeling back in January briefly daughter said and again briefly on 4/7 in [place]. But p;piror to admission to hospital on 4/8, she had a fall and 2 days of double vision, ataxia on left side and left arm numbness that did not go away. She was taken to the  hospital , stroke W/U was C/w stroke despite negative MRI--though to be small vessel source. She was started on asa, seen by neurology, given eye patch and her BP improved(had been high). Sent for rehab      TCU/SNF COURSE: has progressed with PT and OT--does well with patch on eye--then no diplopia.   Found to be Fe deficient and low Vit D, started on Fe Gluc, Vit C, PPI and Vit D/Ca+.  The Hgb has been a recent decline so started on PPI.  Will be going to Memory care at [facility].      PAST MEDICAL HISTORY:  No  "past medical history on file.    DISCHARGE MEDICATIONS:  Current Outpatient Medications   Medication Sig Dispense Refill    acetaminophen (TYLENOL) 325 MG tablet Take 650 mg by mouth daily      ascorbic acid (VITAMIN C) 250 MG CHEW chewable tablet Take 250 mg by mouth three times a week With the Fe Gluc      aspirin 81 MG EC tablet Take 1 tablet (81 mg) by mouth daily      calcium carbonate (TUMS) 500 MG chewable tablet Take 1 chew tab by mouth 2 times daily For supplementation      ferrous gluconate (FERGON) 324 (38 Fe) MG tablet Take 324 mg by mouth three times a week With vitamin C 250mg three times weekly      Multiple Vitamins-Minerals (PRESERVISION AREDS 2) CAPS Take 2 capsules by mouth daily 180 capsule 3    omeprazole (PRILOSEC) 20 MG DR capsule Take 1 capsule (20 mg) by mouth daily      sertraline (ZOLOFT) 50 MG tablet Take 1 tablet (50 mg) by mouth daily 90 tablet 1    vitamin D3 (CHOLECALCIFEROL) 10 MCG (400 UNIT) capsule Take 1 capsule by mouth daily         MEDICATION CHANGES/RATIONALE:   SEE DISCHARGE ORDERS  /72   Pulse 92   Temp 98.3  F (36.8  C)   Resp 18   Ht 1.499 m (4' 11\")   Wt 46.2 kg (101 lb 12.8 oz)   SpO2 99%   BMI 20.56 kg/m      TODAY DURING EXAM/ROS:  No CP, SOB, Cough, dizziness, fevers, chills, HA, N/V, dysuria or Bowel Abnormalities. Appetite is GOOD.  No pain      PHYSICAL EXAM Today:  A & O x 3, NAD. Lungs CTA, non labored. RRR, S1/S2 w/o murmur,gallop or rub.  N oedema.  Abdomen soft, nontender, +BT'S. No focal neurological deficits. THURMAN and up with walker. Patch on left eye .       SNF LABS  Recent Labs   Lab Test 04/08/24  1159 01/09/24  0520    136   POTASSIUM 4.9 3.9   CHLORIDE 104 106   CO2 23 22   ANIONGAP 10 8   GLC 86 86   BUN 18.4 17.1   CR 0.87 0.83   VONDA 9.9* 8.9    < > = values in this interval not displayed.     Hemoglobin   Date Value Ref Range Status   04/30/2024 8.6 (L) 11.7 - 15.7 g/dL Final   04/08/2024 9.5 (L) 11.7 - 15.7 g/dL Final     Ferritin "   Date Value Ref Range Status   01/08/2024 25 11 - 328 ng/mL Final     Iron   Date Value Ref Range Status   04/30/2024 25 (L) 37 - 145 ug/dL Final     Iron Binding Capacity   Date Value Ref Range Status   01/08/2024 362 240 - 430 ug/dL Final   4/30/2024 Vitamin D==23    Appt:   5/13/2024 0715 Meg Morelos MD, neurophthalmology            DISCHARGE PLAN:  Occupational Therapy, Physical Therapy, Home Health Aide, and From:  [facility] Follow-up with PCP in 7 days:     [facility] or other scheduled appointments:      MTM referral needed and placed by this provider: none    Pending labs: Hgb in 2-3 weeks     Discharge Treatments:none except eye patch for diplopia       TOTAL DISCHARGE TIME:   Greater than 30 minutes    MARILU Simmons CNP    [place] [facility]

## 2024-05-04 ENCOUNTER — LAB REQUISITION (OUTPATIENT)
Dept: LAB | Facility: CLINIC | Age: 89
End: 2024-05-04
Payer: MEDICARE

## 2024-05-04 DIAGNOSIS — D64.9 ANEMIA, UNSPECIFIED: ICD-10-CM

## 2024-05-06 NOTE — PROGRESS NOTES
Keysha Anders is a 89 year old female with the following diagnoses:   1. Monocular exotropia         HPI:    Keysha is here with her daughter.  Unfortunately, she had a suspected stroke 4/7/24; she fell overnight when walking to the restroom.  She got back in bed and called her daughter the next morning reporting double vision.    Since then, she has been patching her left eye.  Her left eye is significantly blurrier than her right eye due to macular degeneration.  The double vision has not improved since onset.    She denies previous h/o strabismus.  No h/o eye patching prior to CVA.    Independent historians:  Patient + daughter    Review of outside testing:  MRI Brain + MRA Head/Neck WWO 4/8/24:  Impression:  1. No evidence of acute infarction or intracranial hemorrhage.  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   2. No abnormal enhancing lesions intracranially.  3. Head MRA demonstrates no definite aneurysm or stenosis of the major  intracranial arteries.  4. Neck MRA demonstrates patent major cervical arteries.    Review of outside clinical notes:  Discharge Summary 4/8/24:  Keysha Anders is a 89 year old female with a history of Anxiety, Cataract, Spinal Stenosis, Hyperparathyroidism, KISHA, Macular Degeneration, Thyroid Nodule, Osteoporosis, HTN, RLS, Alzheimer's Dementia who presented to the ED today after a fall overnight. When patient woke up this morning, patient was seeing double, hypertensive and unsteady. Left arm is tingly.       Pt presents from her facility with an unwitnessed fall overnight and two days of double vision.  She reports left arm tingling, but has also reported this in the past and wears a LUE brace for suspected carpal tunnel.  She has a history of macular degeneration and cataracts.  Per ED report, was noted to have left sided ataxia with difficulty walking on admission prompting stoke work up.     Left-sided numbness, Left-sided ataxia and Diplopia  High  suspicion for MRI negative small vessel CVA  -Stable diplopia  -Appreciate prompt and continuous input from stroke neurology  -Plans for cardiac event monitor upon discharge  -Will benefit in seeing neuro-ophthalmology and follow-up with stroke neurology    Past medical history:    Patient Active Problem List   Diagnosis    Exudative age-related macular degeneration of both eyes with inactive scar (H)    Hyperparathyroidism (H24)    Spinal stenosis of lumbar region without neurogenic claudication    Spondylosis of lumbar region without myelopathy or radiculopathy    Memory loss    Generalized weakness    Numbness and tingling in left arm    Double vision    Dizziness    Ataxia, unspecified    Paresthesia of skin    Diplopia    Small vessel stroke (H)    Alzheimer's disease, unspecified (CODE) (H)    Anxiety disorder, unspecified type    Personal history of fall    Physical deconditioning    Exudative age-related macular degeneration, bilateral, with inactive scar (H)    Hyperparathyroidism, unspecified (H24)    Anemia, unspecified type    Iron deficiency anemia, unspecified iron deficiency anemia type    Vitamin D deficiency         Medications:   acetaminophen  ascorbic acid Chew  aspirin  calcium carbonate  ferrous gluconate  omeprazole  PreserVision AREDS 2 Caps  sertraline  vitamin D3      Social history:  Patient  reports that she quit smoking about 11 years ago. Her smoking use included cigarettes. She started smoking about 77 years ago. She has a 19.8 pack-year smoking history. She has quit using smokeless tobacco.       Exam:  Corrected distance visual acuity was 20/40 in the right eye and 20/500 in the left eye. Intraocular pressure was 11 in the right eye and 11 in the left eye using ICare.  Color vision 3/11 right eye and UTT left eye.  Pupils isocoric with no APD.    See complete chart note for anterior segment, fundus, and strabismus exam.    Assessment/Plan:   It is my impression that this patient has  a large angle left exotropia in the setting of a recent CVA.  Unfortunately, despite the poor visual acuity in the left eye 2* exudative AMD, she is symptomatic.  Monocular blur was not sufficient with tape; I discussed a trial of Press n[employer] Seal for additional blur.  I provided some adhesive patches to apply directly to her left lens in the meantime; she may also consider a cloth patch.  For reading, I recommended a +3.00 OTC reader with monocular occlusion (comfortable 20/40+ in trial frame).    Follow-up in 3-4 months or sooner with concerns/changes.    Complete documentation of historical and exam elements from today's encounter can be found in the full encounter summary report (not reduplicated in this progress note). I personally obtained the chief complaint(s) and history of present illness. I confirmed and edited as necessary the review of systems, past medical/surgical history, family history, social history, and examination findings as document by others; and I examined the patient myself. I personally reviewed the relevant tests, images, and reports as documented above. I formulated and edited as necessary the assessment and plan and discussed the findings and management plan with the patient and family.    Meg Morelos, OD

## 2024-05-08 ENCOUNTER — MEDICAL CORRESPONDENCE (OUTPATIENT)
Dept: HEALTH INFORMATION MANAGEMENT | Facility: CLINIC | Age: 89
End: 2024-05-08

## 2024-05-08 ENCOUNTER — TELEPHONE (OUTPATIENT)
Dept: FAMILY MEDICINE | Facility: CLINIC | Age: 89
End: 2024-05-08
Payer: MEDICARE

## 2024-05-08 NOTE — TELEPHONE ENCOUNTER
Left detailed voice message for Anne with [facility] relaying below orders.    Kenneth RAGSDALE RN

## 2024-05-08 NOTE — TELEPHONE ENCOUNTER
Home Care is calling regarding an established patient with [facility].        5/8/2024    11:40 AM   Home Care Information   Date of Home Care episode start 5/8/2024    Name/Phone Number Anne [phone]   Home Care agency [facility]     Requesting orders from: Wegener, Joel Daniel Irwin  Provider is following patient: No       Orders Requested    Physical Therapy  Request for initial certification (first set of orders)   Frequency:  1x/wk for 4 wks  1 visit every other wk for 4 wks    Occupational Therapy  Request for initial evaluation and treatment (one time) (first set of orders)       Provider review needed.    Information was gathered and will be sent to provider for review and to confirm provider will be following patient.   RN will contact Home Care with information after provider review.  Confirmed ok to leave a detailed message with call back.  Contact information confirmed and updated as needed.    Kenneth Ponce RN

## 2024-05-10 ENCOUNTER — APPOINTMENT (OUTPATIENT)
Dept: CT IMAGING | Facility: CLINIC | Age: 89
End: 2024-05-10
Attending: STUDENT IN AN ORGANIZED HEALTH CARE EDUCATION/TRAINING PROGRAM
Payer: MEDICARE

## 2024-05-10 ENCOUNTER — HOSPITAL ENCOUNTER (EMERGENCY)
Facility: CLINIC | Age: 89
Discharge: HOME | End: 2024-05-11
Attending: STUDENT IN AN ORGANIZED HEALTH CARE EDUCATION/TRAINING PROGRAM | Admitting: STUDENT IN AN ORGANIZED HEALTH CARE EDUCATION/TRAINING PROGRAM
Payer: MEDICARE

## 2024-05-10 DIAGNOSIS — N28.9 RENAL INSUFFICIENCY: ICD-10-CM

## 2024-05-10 DIAGNOSIS — R20.2 PARESTHESIA OF LEFT UPPER LIMB: ICD-10-CM

## 2024-05-10 DIAGNOSIS — H81.8X2 UNILATERAL VESTIBULAR WEAKNESS, LEFT: ICD-10-CM

## 2024-05-10 LAB
ANION GAP SERPL CALCULATED.3IONS-SCNC: 8 MMOL/L (ref 7–15)
APTT PPP: 29 SECONDS (ref 22–38)
ATRIAL RATE - MUSE: 84 BPM
BASOPHILS # BLD AUTO: 0.1 10E3/UL (ref 0–0.2)
BASOPHILS NFR BLD AUTO: 1 %
BUN SERPL-MCNC: 23.6 MG/DL (ref 8–23)
CALCIUM SERPL-MCNC: 9.7 MG/DL (ref 8.2–9.6)
CHLORIDE SERPL-SCNC: 106 MMOL/L (ref 98–107)
CREAT SERPL-MCNC: 1.09 MG/DL (ref 0.51–0.95)
DEPRECATED HCO3 PLAS-SCNC: 25 MMOL/L (ref 22–29)
DIASTOLIC BLOOD PRESSURE - MUSE: NORMAL MMHG
EGFRCR SERPLBLD CKD-EPI 2021: 47 ML/MIN/1.73M2
EOSINOPHIL # BLD AUTO: 0.1 10E3/UL (ref 0–0.7)
EOSINOPHIL NFR BLD AUTO: 2 %
ERYTHROCYTE [DISTWIDTH] IN BLOOD BY AUTOMATED COUNT: 17.3 % (ref 10–15)
GLUCOSE SERPL-MCNC: 116 MG/DL (ref 70–99)
HCT VFR BLD AUTO: 26.4 % (ref 35–47)
HGB BLD-MCNC: 8.3 G/DL (ref 11.7–15.7)
IMM GRANULOCYTES # BLD: 0 10E3/UL
IMM GRANULOCYTES NFR BLD: 0 %
INR PPP: 1.01 (ref 0.85–1.15)
INTERPRETATION ECG - MUSE: NORMAL
LYMPHOCYTES # BLD AUTO: 1.5 10E3/UL (ref 0.8–5.3)
LYMPHOCYTES NFR BLD AUTO: 25 %
MCH RBC QN AUTO: 25.9 PG (ref 26.5–33)
MCHC RBC AUTO-ENTMCNC: 31.4 G/DL (ref 31.5–36.5)
MCV RBC AUTO: 83 FL (ref 78–100)
MONOCYTES # BLD AUTO: 0.7 10E3/UL (ref 0–1.3)
MONOCYTES NFR BLD AUTO: 11 %
NEUTROPHILS # BLD AUTO: 3.7 10E3/UL (ref 1.6–8.3)
NEUTROPHILS NFR BLD AUTO: 61 %
NRBC # BLD AUTO: 0 10E3/UL
NRBC BLD AUTO-RTO: 0 /100
P AXIS - MUSE: 48 DEGREES
PLATELET # BLD AUTO: 241 10E3/UL (ref 150–450)
POTASSIUM SERPL-SCNC: 4.2 MMOL/L (ref 3.4–5.3)
PR INTERVAL - MUSE: 186 MS
QRS DURATION - MUSE: 76 MS
QT - MUSE: 346 MS
QTC - MUSE: 408 MS
R AXIS - MUSE: 17 DEGREES
RBC # BLD AUTO: 3.2 10E6/UL (ref 3.8–5.2)
SODIUM SERPL-SCNC: 139 MMOL/L (ref 135–145)
SYSTOLIC BLOOD PRESSURE - MUSE: NORMAL MMHG
T AXIS - MUSE: 40 DEGREES
TROPONIN T SERPL HS-MCNC: 18 NG/L
VENTRICULAR RATE- MUSE: 84 BPM
WBC # BLD AUTO: 6 10E3/UL (ref 4–11)

## 2024-05-10 PROCEDURE — 70496 CT ANGIOGRAPHY HEAD: CPT | Mod: MG

## 2024-05-10 PROCEDURE — 70450 CT HEAD/BRAIN W/O DYE: CPT | Mod: MG,XU

## 2024-05-10 PROCEDURE — G0425 INPT/ED TELECONSULT30: HCPCS | Mod: G0 | Performed by: INTERNAL MEDICINE

## 2024-05-10 PROCEDURE — 84484 ASSAY OF TROPONIN QUANT: CPT | Performed by: STUDENT IN AN ORGANIZED HEALTH CARE EDUCATION/TRAINING PROGRAM

## 2024-05-10 PROCEDURE — 93005 ELECTROCARDIOGRAM TRACING: CPT

## 2024-05-10 PROCEDURE — 250N000009 HC RX 250: Performed by: STUDENT IN AN ORGANIZED HEALTH CARE EDUCATION/TRAINING PROGRAM

## 2024-05-10 PROCEDURE — 36415 COLL VENOUS BLD VENIPUNCTURE: CPT | Performed by: STUDENT IN AN ORGANIZED HEALTH CARE EDUCATION/TRAINING PROGRAM

## 2024-05-10 PROCEDURE — 85610 PROTHROMBIN TIME: CPT | Performed by: STUDENT IN AN ORGANIZED HEALTH CARE EDUCATION/TRAINING PROGRAM

## 2024-05-10 PROCEDURE — 99285 EMERGENCY DEPT VISIT HI MDM: CPT | Mod: 25

## 2024-05-10 PROCEDURE — 80048 BASIC METABOLIC PNL TOTAL CA: CPT | Performed by: STUDENT IN AN ORGANIZED HEALTH CARE EDUCATION/TRAINING PROGRAM

## 2024-05-10 PROCEDURE — 250N000011 HC RX IP 250 OP 636: Performed by: STUDENT IN AN ORGANIZED HEALTH CARE EDUCATION/TRAINING PROGRAM

## 2024-05-10 PROCEDURE — 85730 THROMBOPLASTIN TIME PARTIAL: CPT | Performed by: STUDENT IN AN ORGANIZED HEALTH CARE EDUCATION/TRAINING PROGRAM

## 2024-05-10 PROCEDURE — 85041 AUTOMATED RBC COUNT: CPT | Performed by: STUDENT IN AN ORGANIZED HEALTH CARE EDUCATION/TRAINING PROGRAM

## 2024-05-10 PROCEDURE — 0042T CT HEAD PERFUSION W CONTRAST: CPT | Mod: GZ

## 2024-05-10 RX ORDER — IOPAMIDOL 755 MG/ML
117 INJECTION, SOLUTION INTRAVASCULAR ONCE
Status: COMPLETED | OUTPATIENT
Start: 2024-05-10 | End: 2024-05-10

## 2024-05-10 RX ADMIN — IOPAMIDOL 117 ML: 755 INJECTION, SOLUTION INTRAVENOUS at 22:12

## 2024-05-10 RX ADMIN — SODIUM CHLORIDE 100 ML: 9 INJECTION, SOLUTION INTRAVENOUS at 22:12

## 2024-05-10 ASSESSMENT — ACTIVITIES OF DAILY LIVING (ADL)
ADLS_ACUITY_SCORE: 38
ADLS_ACUITY_SCORE: 38

## 2024-05-11 ENCOUNTER — HEALTH MAINTENANCE LETTER (OUTPATIENT)
Age: 89
End: 2024-05-11

## 2024-05-11 ENCOUNTER — APPOINTMENT (OUTPATIENT)
Dept: MRI IMAGING | Facility: CLINIC | Age: 89
End: 2024-05-11
Attending: STUDENT IN AN ORGANIZED HEALTH CARE EDUCATION/TRAINING PROGRAM
Payer: MEDICARE

## 2024-05-11 VITALS
BODY MASS INDEX: 19.64 KG/M2 | RESPIRATION RATE: 20 BRPM | TEMPERATURE: 98.8 F | SYSTOLIC BLOOD PRESSURE: 125 MMHG | DIASTOLIC BLOOD PRESSURE: 89 MMHG | WEIGHT: 106.7 LBS | HEART RATE: 81 BPM | HEIGHT: 62 IN | OXYGEN SATURATION: 96 %

## 2024-05-11 LAB — TROPONIN T SERPL HS-MCNC: 18 NG/L

## 2024-05-11 PROCEDURE — 84484 ASSAY OF TROPONIN QUANT: CPT | Performed by: STUDENT IN AN ORGANIZED HEALTH CARE EDUCATION/TRAINING PROGRAM

## 2024-05-11 PROCEDURE — 36415 COLL VENOUS BLD VENIPUNCTURE: CPT | Performed by: STUDENT IN AN ORGANIZED HEALTH CARE EDUCATION/TRAINING PROGRAM

## 2024-05-11 PROCEDURE — 255N000002 HC RX 255 OP 636: Performed by: STUDENT IN AN ORGANIZED HEALTH CARE EDUCATION/TRAINING PROGRAM

## 2024-05-11 PROCEDURE — A9585 GADOBUTROL INJECTION: HCPCS | Performed by: STUDENT IN AN ORGANIZED HEALTH CARE EDUCATION/TRAINING PROGRAM

## 2024-05-11 PROCEDURE — 70553 MRI BRAIN STEM W/O & W/DYE: CPT | Mod: MA

## 2024-05-11 RX ORDER — GADOBUTROL 604.72 MG/ML
5 INJECTION INTRAVENOUS ONCE
Status: COMPLETED | OUTPATIENT
Start: 2024-05-11 | End: 2024-05-11

## 2024-05-11 RX ADMIN — GADOBUTROL 5 ML: 604.72 INJECTION INTRAVENOUS at 01:49

## 2024-05-11 ASSESSMENT — ACTIVITIES OF DAILY LIVING (ADL)
ADLS_ACUITY_SCORE: 38

## 2024-05-11 NOTE — ED PROVIDER NOTES
"  Emergency Department Note      History of Present Illness     Chief Complaint  One-sided Weakness    HPI  Keysha Anders is a 89 year old female who presents from her memory care facility via EMS for stroke evaluation. EMS reports that the patient woke up from a nap today and staff at her facility called EMS because they thought she might be having some unilateral weakness on her left-side. They are unsure when her previous stroke was or what deficits she has at baseline. They are unsure of a last known well time. Patient is denying unilateral weakness but states she has a tingling sensation on the left side of her body. EMS reports blood sugar of 186.     Independent Historian  EMS as detailed above.    Review of External Notes  April 11, 2024, admission for double vision.  Past Medical History   Medical History and Problem List  No past medical history on file.    Medications  acetaminophen (TYLENOL) 325 MG tablet  ascorbic acid (VITAMIN C) 250 MG CHEW chewable tablet  aspirin 81 MG EC tablet  calcium carbonate (TUMS) 500 MG chewable tablet  ferrous gluconate (FERGON) 324 (38 Fe) MG tablet  Multiple Vitamins-Minerals (PRESERVISION AREDS 2) CAPS  omeprazole (PRILOSEC) 20 MG DR capsule  sertraline (ZOLOFT) 50 MG tablet  vitamin D3 (CHOLECALCIFEROL) 10 MCG (400 UNIT) capsule        Surgical History   No past surgical history on file.  Physical Exam   Patient Vitals for the past 24 hrs:   BP Temp Temp src Pulse Resp SpO2 Height Weight   05/11/24 0200 (!) 153/83 -- -- 79 26 97 % -- --   05/11/24 0100 120/69 -- -- 70 12 -- -- --   05/11/24 0030 135/70 -- -- 81 14 96 % -- --   05/11/24 0000 131/70 -- -- 81 (!) 9 96 % -- --   05/10/24 2300 138/68 -- -- 84 -- 96 % -- --   05/10/24 2230 (!) 163/80 -- -- -- -- 98 % -- --   05/10/24 2209 (!) 145/76 98.8  F (37.1  C) Temporal 92 17 99 % 1.575 m (5' 2\") 48.4 kg (106 lb 11.2 oz)     Physical Exam  GENERAL: Patient appears frail.  HEAD: Atraumatic.  EYES: Eye patch on left eye. "     NOSE: No active bleeding  MOUTH: Moist mucosa  THROAT: Patent airway.   NECK: No rigidity  CV: RRR, no murmurs rubs or gallops  PULM: CTAB with good aeration; no retractions, rales, rhonchi, or wheezing  ABD: Soft, nontender, nondistended, no guarding, no peritoneal signs   DERM: No rash. Skin warm and dry  EXTREMITY: Moving all extremities without difficulty. No calf tenderness or peripheral edema  VASCULAR: Symmetric pulses bilaterally  NEURO: Alert.  Some confusion.  Speaking clearly.  CN 2-12 fully intact except right eye does not move medially. Strength 5/5 bilateral LE/UE. Sensation fully intact to light touch symmetrically bilateral LE/UE.    Diagnostics   Lab Results   Labs Ordered and Resulted from Time of ED Arrival to Time of ED Departure   BASIC METABOLIC PANEL - Abnormal       Result Value    Sodium 139      Potassium 4.2      Chloride 106      Carbon Dioxide (CO2) 25      Anion Gap 8      Urea Nitrogen 23.6 (*)     Creatinine 1.09 (*)     GFR Estimate 47 (*)     Calcium 9.7 (*)     Glucose 116 (*)    TROPONIN T, HIGH SENSITIVITY - Abnormal    Troponin T, High Sensitivity 18 (*)    CBC WITH PLATELETS AND DIFFERENTIAL - Abnormal    WBC Count 6.0      RBC Count 3.20 (*)     Hemoglobin 8.3 (*)     Hematocrit 26.4 (*)     MCV 83      MCH 25.9 (*)     MCHC 31.4 (*)     RDW 17.3 (*)     Platelet Count 241      % Neutrophils 61      % Lymphocytes 25      % Monocytes 11      % Eosinophils 2      % Basophils 1      % Immature Granulocytes 0      NRBCs per 100 WBC 0      Absolute Neutrophils 3.7      Absolute Lymphocytes 1.5      Absolute Monocytes 0.7      Absolute Eosinophils 0.1      Absolute Basophils 0.1      Absolute Immature Granulocytes 0.0      Absolute NRBCs 0.0     TROPONIN T, HIGH SENSITIVITY - Abnormal    Troponin T, High Sensitivity 18 (*)    INR - Normal    INR 1.01     PARTIAL THROMBOPLASTIN TIME - Normal    aPTT 29     GLUCOSE MONITOR NURSING POCT     Imaging  MR Brain w/o & w Contrast    Final Result   IMPRESSION:   1.  No acute intracranial process.   2.  Generalized brain atrophy and presumed microvascular ischemic changes as detailed above.      CT Head Perfusion w Contrast - For Tier 2 Stroke   Final Result   IMPRESSION:       CT PERFUSION:   1.  Fairly symmetrical cerebral perfusion with no discrete focal deficit.      CTA Head Neck with Contrast   Final Result   IMPRESSION:    HEAD CT:   1.  No CT finding of a mass, hemorrhage or focal area suggestive of acute infarct.   2.  Diffuse age related changes.      HEAD CTA:    1.  No discrete vessel occlusion, significant stenosis, aneurysm or high flow vascular malformation involving the arteries of the [place].      NECK CTA:   1.  Bovine arch configuration of the great vessels off the aortic arch with no significant stenosis of their origins.   2.  No significant stenosis or irregularity involving the arteries of neck by NASCET criteria.   3.  No radiographic evidence of dissection.      Head CT findings were communicated by phone to Dr. Neal at 10:21 AM. CTA results were communicated to Dr. Neal at on 5/10/2024.      CT Head w/o Contrast   Final Result   IMPRESSION:    HEAD CT:   1.  No CT finding of a mass, hemorrhage or focal area suggestive of acute infarct.   2.  Diffuse age related changes.      HEAD CTA:    1.  No discrete vessel occlusion, significant stenosis, aneurysm or high flow vascular malformation involving the arteries of the [place].      NECK CTA:   1.  Bovine arch configuration of the great vessels off the aortic arch with no significant stenosis of their origins.   2.  No significant stenosis or irregularity involving the arteries of neck by NASCET criteria.   3.  No radiographic evidence of dissection.      Head CT findings were communicated by phone to Dr. Neal at 10:21 AM. CTA results were communicated to Dr. Neal at on 5/10/2024.        EKG   ECG interpreted by me.  Time 2207  NSR at 84 with sinus  arrhythmia. No ST elevation or depression. Normal intervals. Normal axis.   No evidence of WPW, Brugada, HOCM, ARVD, ASD, or Wellen's.         Independent Interpretation  CT head no bleed  ED Course    Medications Administered  Medications   sodium chloride 0.9% BOLUS 500 mL (500 mLs Intravenous Not Given 5/11/24 0217)   iopamidol (ISOVUE-370) solution 117 mL (117 mLs Intravenous $Given 5/10/24 2212)     And   sodium chloride 0.9 % bag for CT scan flush use (100 mLs As instructed $Given 5/10/24 2212)   gadobutrol (GADAVIST) injection 5 mL (5 mLs Intravenous $Given 5/11/24 0149)   sodium chloride (PF) 0.9% PF flush 10 mL (10 mLs Intravenous $Given 5/11/24 0149)     Procedures  Procedures     Discussion of Management  Discussed with stroke neurology Dr. Whitaker    Social Determinants of Health adding to complexity of care  None    ED Course  ED Course as of 05/11/24 0258   Fri May 10, 2024   2201 Tier 2 code stroke was called.      Medical Decision Making / Diagnosis        LASHANDA  Keysha Anders is a 89 year old female presented with concerns of left-sided paresthesias and/or weakness.  Chronic conditions complicating-Alzheimer's dementia.  Chronic diplopia.  Differential diagnosis-considered CVA, bleed, mass, and others.  Tier 2 stroke activated.  Exam notable for right eye inability to adduct and limited range of motion but can abduct well.  Questionable decreased strength on the left leg initially but then I rechecked it and her strength was 5 out of 5.  Labs with slight elevation in creatinine otherwise reassuring.  I suspect her diplopia is due to her inability to abduct the right eye.  The diplopia has been present for quite some time so do not think this is an acute issue.  CT head, CTA head and neck, and perfusion studies all reassuring.  MRI brain without stroke.  Patient has close follow-up with neuro-ophthalmology.  Closed-loop with stroke neurology and they recommend discharge back to memory facility.  All  questions answered.  Patient's family members later arrived and are content with plan.  Disposition  The patient was discharged.     ICD-10 Codes:    ICD-10-CM    1. Paresthesia of left upper limb  R20.2       2. Unilateral vestibular weakness, left  H81.8X2       3. Renal insufficiency  N28.9            Discharge Medications  New Prescriptions    No medications on file     Scribe Disclosure:  Citlalli CONLEY, am serving as a scribe at 10:21 PM on 5/10/2024 to document services personally performed by Fabian Neal MD based on my observations and the provider's statements to me.     [employer]      Fabian Neal MD  05/11/24 0255

## 2024-05-11 NOTE — CONSULTS
[facility]     Stroke Code Note          History of Present Illness     Chief Complaint: One-sided Weakness      Keysha Anders is a 89 year old woman with hx of HTN, alzheimer's dementia, spinal stenosis, RLS, hyperparathyroidism, osteoporosis, macular degeneration coming with left sided weakness. She was recently admitted for what was thought to be MRI negative stroke- presented with diplopia, left sided weakness and coordination problems at that time. She was discharged on ASA 81 mg. The patient and her family decided not to initiate statin therapy.     She woke up this evening at the memory care facility after a nap and thought she has had left sided weakness.   She tells me that she had a crawling sensation in the left hand. This has since resolved.    Evaluation Summarized     MRI/Head CT Brain MRI w/o (just coronal DWI) 4/8: no evidence of acute infarct      Brain MRI w/wo 4/8: no evidence of acute infarct or hemorrhage; severe chronic small chronic vessel disease   Intracranial Vasculature MRA: No LVO or significant stenosis   Cervical Vasculature MRA: No LVO, significant stenosis or dissection       Echocardiogram LVEF 60-65%, mild concentric LVH, no regional wall motion abnormalities, no thrombus identified in LV; LA is mildly dilated, spontaneous contrast in ISAIAH   EKG/Telemetry Sinus rhythm    Other Testing Not Applicable       LDL  118   A1C  4/8/2024: 5.5 %   Troponin 4/9/2024: 17 ng/L             Past Medical History     Stroke risk factors: HTN    Preadmission antithrombotic regimen: ASA 81 mg    Modified Brant Score (Pre-morbid)  3 - Moderate disability.  Requires some help, but able to walk unassisted.                   Assessment and Plan       Unlikely to be a stroke.   Decribes similar symptoms from her prior stroke  Can obtain an MRI brain- if no stroke, can go back to her memory care.   Continue ASA 81 mg and   F/u in stroke clinic  PT/OT  Delirium precautions      ___________________________________________________________________    Justin Whitaker MD  ___________________________________________________________________        Imaging/Labs   (personally reviewed )    CT head: no acute IC changes  CTA head/neck: no occlusions.         Physical Examination     BP: (!) 145/76   Pulse: 92   Resp: 17   Temp: 98.8  F (37.1  C)   Temp src: Temporal   SpO2: 99 %   O2 Device: None (Room air)   Weight: 48.4 kg (106 lb 11.2 oz)    Wt Readings from Last 2 Encounters:   05/10/24 48.4 kg (106 lb 11.2 oz)   05/03/24 46.2 kg (101 lb 12.8 oz)       General:  patient lying in bed without any acute distress    HEENT:  normocephalic/atraumatic  Pulmonary:  no respiratory distress        Neuro Exam  Mental Status:  alert, oriented to person, place, situation and age,  follows commands, mild loss of verbal fluency but naming and repetition normal  Cranial Nerves:  facial sensation intact and symmetric  facial movements symmetric, hearing not formally tested but intact to conversation, no dysarthria, shoulder shrug equal bilaterally, tongue protrusion midline  Motor:  mild drift in LLE  Reflexes:  unable to test   Sensory: symmetric  Coordination:  ataxia on the let side  Station/Gait:  unable to test due to telestroke           Stroke Scales       NIHSS  1a. Level of Consciousness 0-->Alert, keenly responsive   1b. LOC Questions 0-->Answers both questions correctly   1c. LOC Commands 0-->Performs both tasks correctly   2.   Best Gaze 1-->Partial gaze palsy, gaze is abnormal in one or both eyes, but forced deviation or total gaze paresis is not present   3.   Visual 0-->No visual loss   4.   Facial Palsy 0-->Normal symmetrical movements   5a. Motor Arm, Left 0-->No drift, limb holds 90 (or 45) degrees for full 10 secs   5b. Motor Arm, Right 0-->No drift, limb holds 90 (or 45) degrees for full 10 secs   6a. Motor Leg, Left 1-->Drift, leg falls by the end of the 5-sec period but does not hit bed  "  6b. Motor Leg, right 0-->No drift, leg holds 30 degree position for full 5 secs   7.   Limb Ataxia 0-->Absent   8.   Sensory 0-->Normal, no sensory loss   9.   Best Language 0-->No aphasia, normal   10. Dysarthria 0-->Normal   11. Extinction and Inattention  0-->No abnormality   Total 2 (05/10/24 2220)            Labs     CBC  Lab Results   Component Value Date    HGB 8.3 (L) 05/10/2024    HCT 26.4 (L) 05/10/2024    WBC 6.0 05/10/2024     05/10/2024       BMP  Lab Results   Component Value Date     04/08/2024    POTASSIUM 4.9 04/08/2024    CHLORIDE 104 04/08/2024    CO2 23 04/08/2024    BUN 18.4 04/08/2024    CR 0.87 04/08/2024    GLC 81 04/09/2024    VONDA 9.9 (H) 04/08/2024       INR  No results found for: \"INR\"    Data   Stroke Code Data  (for stroke code with tele)  Stroke code activated 05/10/24  2203   First stroke provider response 05/10/24  2203   Video start time 05/10/24  2203   Video end time 05/10/24  2230   Last known normal 05/10/24      Time of discovery (or onset of symptoms)  05/10/24      Head CT read by Stroke Neuro Provider 05/10/24  2215   Was stroke code de-escalated? Yes  05/10/24  2230     Telestroke Service Details  Type of service telemedicine diagnostic assessment of acute neurological changes   Reason telemedicine is appropriate patient requires assessment with a specialist for diagnosis and treatment of neurological symptoms   Mode of transmission secure interactive audio and video communication per Ant   Originating site (patient location) [facility]    Distant site (provider location) Provider remote site        Clinically Significant Risk Factors Present on Admission                # Drug Induced Platelet Defect: home medication list includes an antiplatelet medication     # Dementia: noted on problem list        # Financial/Environmental Concerns:            Time Spent on this Encounter   Billing: I personally examined and evaluated the " patient today. At the time of my evaluation and management the patient was critical condition today due to stroke code. I personally managed stroke code. Key decisions made today included imaging and further management. I spent a total of 30 minutes providing critical care services, evaluating the patient, directing care and reviewing laboratory values and radiologic reports.

## 2024-05-11 NOTE — ED TRIAGE NOTES
BIBA from memory care facility. Woke up after a nap and staff called because they think she might be weak on one side. Very poor report from staff to EMS. Unsure what baseline truly is other than that she is in a memory care facility     Triage Assessment (Adult)       Row Name 05/10/24 2200          Triage Assessment    Airway WDL WDL        Respiratory WDL    Respiratory WDL WDL        Skin Circulation/Temperature WDL    Skin Circulation/Temperature WDL WDL        Cardiac WDL    Cardiac WDL WDL        Peripheral/Neurovascular WDL    Peripheral Neurovascular WDL WDL        Cognitive/Neuro/Behavioral WDL    Cognitive/Neuro/Behavioral WDL WDL

## 2024-05-11 NOTE — DISCHARGE INSTRUCTIONS
Return to the emergency department if symptoms are worsening, become concerning, or for any other concerns. Follow-up with your scheduled neuro-ophthalmology appointment.

## 2024-05-13 ENCOUNTER — OFFICE VISIT (OUTPATIENT)
Dept: OPHTHALMOLOGY | Facility: CLINIC | Age: 89
End: 2024-05-13
Payer: MEDICARE

## 2024-05-13 DIAGNOSIS — H50.10 MONOCULAR EXOTROPIA: Primary | ICD-10-CM

## 2024-05-13 PROCEDURE — G0463 HOSPITAL OUTPT CLINIC VISIT: HCPCS

## 2024-05-13 PROCEDURE — 99203 OFFICE O/P NEW LOW 30 MIN: CPT

## 2024-05-13 PROCEDURE — 92060 SENSORIMOTOR EXAMINATION: CPT

## 2024-05-13 ASSESSMENT — VISUAL ACUITY
OS_CC: 20/500
OD_CC: 20/40
CORRECTION_TYPE: GLASSES
METHOD: SNELLEN - LINEAR

## 2024-05-13 ASSESSMENT — REFRACTION_WEARINGRX
SPECS_TYPE: PAL
OD_CYLINDER: +1.50
OD_ADD: +2.50
OD_AXIS: 180
OS_AXIS: 145
OD_SPHERE: -1.00
OS_SPHERE: PLANO
OS_ADD: +2.50
OS_CYLINDER: +1.00

## 2024-05-13 ASSESSMENT — TONOMETRY
OS_IOP_MMHG: 11
OD_IOP_MMHG: 11
IOP_METHOD: ICARE

## 2024-05-13 ASSESSMENT — EXTERNAL EXAM - RIGHT EYE: OD_EXAM: NORMAL

## 2024-05-13 ASSESSMENT — EXTERNAL EXAM - LEFT EYE: OS_EXAM: NORMAL

## 2024-05-13 ASSESSMENT — SLIT LAMP EXAM - LIDS
COMMENTS: DERMATOCHALASIS
COMMENTS: DERMATOCHALASIS

## 2024-05-16 DIAGNOSIS — D50.9 IRON DEFICIENCY ANEMIA, UNSPECIFIED IRON DEFICIENCY ANEMIA TYPE: ICD-10-CM

## 2024-05-16 DIAGNOSIS — D64.9 ANEMIA, UNSPECIFIED TYPE: ICD-10-CM

## 2024-05-16 DIAGNOSIS — H35.3233 EXUDATIVE AGE-RELATED MACULAR DEGENERATION OF BOTH EYES WITH INACTIVE SCAR (H): ICD-10-CM

## 2024-05-16 DIAGNOSIS — K21.9 GASTROESOPHAGEAL REFLUX DISEASE WITHOUT ESOPHAGITIS: ICD-10-CM

## 2024-05-16 DIAGNOSIS — E55.9 VITAMIN D DEFICIENCY: ICD-10-CM

## 2024-05-16 DIAGNOSIS — I63.9 SMALL VESSEL STROKE (H): ICD-10-CM

## 2024-05-16 DIAGNOSIS — F32.1 CURRENT MODERATE EPISODE OF MAJOR DEPRESSIVE DISORDER WITHOUT PRIOR EPISODE (H): ICD-10-CM

## 2024-05-16 DIAGNOSIS — R52 PAIN: ICD-10-CM

## 2024-05-16 DIAGNOSIS — E21.3 HYPERPARATHYROIDISM (H): Primary | ICD-10-CM

## 2024-05-17 RX ORDER — CALCIUM CARBONATE 500 MG/1
TABLET, CHEWABLE ORAL
Qty: 180 TABLET | Refills: 3 | Status: SHIPPED | OUTPATIENT
Start: 2024-05-17 | End: 2024-05-23 | Stop reason: DRUGHIGH

## 2024-05-17 RX ORDER — OMEGA-3S/DHA/EPA/FISH OIL/D3 300MG-1000
1 CAPSULE ORAL DAILY
Qty: 90 TABLET | Refills: 3 | Status: SHIPPED | OUTPATIENT
Start: 2024-05-17 | End: 2024-05-21

## 2024-05-17 RX ORDER — ANTIOX #8/OM3/DHA/EPA/LUT/ZEAX 250-2.5 MG
2 CAPSULE ORAL DAILY
Qty: 180 CAPSULE | Refills: 3 | Status: SHIPPED | OUTPATIENT
Start: 2024-05-17

## 2024-05-17 RX ORDER — FERROUS SULFATE 325(65) MG
TABLET, DELAYED RELEASE (ENTERIC COATED) ORAL
Qty: 36 TABLET | Refills: 3 | Status: SHIPPED | OUTPATIENT
Start: 2024-05-17 | End: 2024-05-21

## 2024-05-17 RX ORDER — ASPIRIN 81 MG
TABLET,CHEWABLE ORAL
Qty: 90 TABLET | Refills: 3 | Status: SHIPPED | OUTPATIENT
Start: 2024-05-17

## 2024-05-17 RX ORDER — ACETAMINOPHEN 325 MG/1
TABLET ORAL
Qty: 180 TABLET | Refills: 3 | Status: SHIPPED | OUTPATIENT
Start: 2024-05-17

## 2024-05-17 RX ORDER — ASCORBIC ACID 250 MG
TABLET,CHEWABLE ORAL
Qty: 36 TABLET | Refills: 3 | Status: SHIPPED | OUTPATIENT
Start: 2024-05-17 | End: 2024-05-21

## 2024-05-21 ENCOUNTER — OFFICE VISIT (OUTPATIENT)
Dept: FAMILY MEDICINE | Facility: CLINIC | Age: 89
End: 2024-05-21
Payer: MEDICARE

## 2024-05-21 ENCOUNTER — MEDICAL CORRESPONDENCE (OUTPATIENT)
Dept: HEALTH INFORMATION MANAGEMENT | Facility: CLINIC | Age: 89
End: 2024-05-21

## 2024-05-21 VITALS
TEMPERATURE: 97.9 F | WEIGHT: 101 LBS | HEART RATE: 85 BPM | SYSTOLIC BLOOD PRESSURE: 121 MMHG | DIASTOLIC BLOOD PRESSURE: 67 MMHG | RESPIRATION RATE: 16 BRPM | OXYGEN SATURATION: 98 % | BODY MASS INDEX: 18.47 KG/M2

## 2024-05-21 DIAGNOSIS — D64.9 ANEMIA, UNSPECIFIED TYPE: ICD-10-CM

## 2024-05-21 DIAGNOSIS — G30.9 ALZHEIMER'S DISEASE, UNSPECIFIED (CODE) (H): ICD-10-CM

## 2024-05-21 DIAGNOSIS — E55.9 HYPOVITAMINOSIS D: ICD-10-CM

## 2024-05-21 DIAGNOSIS — D50.9 IRON DEFICIENCY ANEMIA, UNSPECIFIED IRON DEFICIENCY ANEMIA TYPE: ICD-10-CM

## 2024-05-21 DIAGNOSIS — E03.9 HYPOTHYROIDISM, UNSPECIFIED TYPE: ICD-10-CM

## 2024-05-21 DIAGNOSIS — Z53.9 DIAGNOSIS NOT YET DEFINED: Primary | ICD-10-CM

## 2024-05-21 DIAGNOSIS — Z23 NEED FOR SHINGLES VACCINE: ICD-10-CM

## 2024-05-21 DIAGNOSIS — I63.9 CEREBROVASCULAR ACCIDENT (CVA), UNSPECIFIED MECHANISM (H): Primary | ICD-10-CM

## 2024-05-21 LAB
BASOPHILS # BLD AUTO: 0 10E3/UL (ref 0–0.2)
BASOPHILS NFR BLD AUTO: 1 %
EOSINOPHIL # BLD AUTO: 0.1 10E3/UL (ref 0–0.7)
EOSINOPHIL NFR BLD AUTO: 1 %
ERYTHROCYTE [DISTWIDTH] IN BLOOD BY AUTOMATED COUNT: 17.8 % (ref 10–15)
FERRITIN SERPL-MCNC: 66 NG/ML (ref 11–328)
FOLATE SERPL-MCNC: 14.3 NG/ML (ref 4.6–34.8)
HCT VFR BLD AUTO: 27.7 % (ref 35–47)
HGB BLD-MCNC: 8.3 G/DL (ref 11.7–15.7)
IMM GRANULOCYTES # BLD: 0 10E3/UL
IMM GRANULOCYTES NFR BLD: 0 %
LYMPHOCYTES # BLD AUTO: 1.2 10E3/UL (ref 0.8–5.3)
LYMPHOCYTES NFR BLD AUTO: 24 %
MCH RBC QN AUTO: 25.5 PG (ref 26.5–33)
MCHC RBC AUTO-ENTMCNC: 30 G/DL (ref 31.5–36.5)
MCV RBC AUTO: 85 FL (ref 78–100)
MONOCYTES # BLD AUTO: 0.6 10E3/UL (ref 0–1.3)
MONOCYTES NFR BLD AUTO: 12 %
NEUTROPHILS # BLD AUTO: 3.1 10E3/UL (ref 1.6–8.3)
NEUTROPHILS NFR BLD AUTO: 63 %
PLATELET # BLD AUTO: 294 10E3/UL (ref 150–450)
RBC # BLD AUTO: 3.26 10E6/UL (ref 3.8–5.2)
RETICS # AUTO: 0.06 10E6/UL (ref 0.03–0.1)
RETICS/RBC NFR AUTO: 1.8 % (ref 0.5–2)
T4 FREE SERPL-MCNC: 0.89 NG/DL (ref 0.9–1.7)
TSH SERPL DL<=0.005 MIU/L-ACNC: 7.63 UIU/ML (ref 0.3–4.2)
VIT B12 SERPL-MCNC: 284 PG/ML (ref 232–1245)
WBC # BLD AUTO: 4.9 10E3/UL (ref 4–11)

## 2024-05-21 PROCEDURE — G0180 MD CERTIFICATION HHA PATIENT: HCPCS | Performed by: FAMILY MEDICINE

## 2024-05-21 PROCEDURE — 90480 ADMN SARSCOV2 VAC 1/ONLY CMP: CPT | Performed by: FAMILY MEDICINE

## 2024-05-21 PROCEDURE — 84443 ASSAY THYROID STIM HORMONE: CPT | Performed by: FAMILY MEDICINE

## 2024-05-21 PROCEDURE — 82746 ASSAY OF FOLIC ACID SERUM: CPT | Performed by: FAMILY MEDICINE

## 2024-05-21 PROCEDURE — 85045 AUTOMATED RETICULOCYTE COUNT: CPT | Performed by: FAMILY MEDICINE

## 2024-05-21 PROCEDURE — 85025 COMPLETE CBC W/AUTO DIFF WBC: CPT | Performed by: FAMILY MEDICINE

## 2024-05-21 PROCEDURE — 36415 COLL VENOUS BLD VENIPUNCTURE: CPT | Performed by: FAMILY MEDICINE

## 2024-05-21 PROCEDURE — 82607 VITAMIN B-12: CPT | Performed by: FAMILY MEDICINE

## 2024-05-21 PROCEDURE — 90677 PCV20 VACCINE IM: CPT | Performed by: FAMILY MEDICINE

## 2024-05-21 PROCEDURE — 99214 OFFICE O/P EST MOD 30 MIN: CPT | Mod: 25 | Performed by: FAMILY MEDICINE

## 2024-05-21 PROCEDURE — 91320 SARSCV2 VAC 30MCG TRS-SUC IM: CPT | Performed by: FAMILY MEDICINE

## 2024-05-21 PROCEDURE — G0009 ADMIN PNEUMOCOCCAL VACCINE: HCPCS | Performed by: FAMILY MEDICINE

## 2024-05-21 PROCEDURE — 82728 ASSAY OF FERRITIN: CPT | Performed by: FAMILY MEDICINE

## 2024-05-21 PROCEDURE — 84439 ASSAY OF FREE THYROXINE: CPT | Performed by: FAMILY MEDICINE

## 2024-05-21 PROCEDURE — 99207 BLOOD MORPHOLOGY PATHOLOGIST REVIEW: CPT | Performed by: PATHOLOGY

## 2024-05-21 RX ORDER — FERROUS SULFATE 325(65) MG
325 TABLET, DELAYED RELEASE (ENTERIC COATED) ORAL DAILY
Qty: 36 TABLET | Refills: 3 | Status: SHIPPED | OUTPATIENT
Start: 2024-05-21 | End: 2024-05-22

## 2024-05-21 RX ORDER — CHOLECALCIFEROL (VITAMIN D3) 50 MCG
1 TABLET ORAL DAILY
Qty: 90 TABLET | Refills: 3 | Status: SHIPPED | OUTPATIENT
Start: 2024-05-21 | End: 2024-05-22

## 2024-05-21 RX ORDER — ASCORBIC ACID 250 MG
250 TABLET,CHEWABLE ORAL DAILY
Qty: 36 TABLET | Refills: 3 | Status: SHIPPED | OUTPATIENT
Start: 2024-05-21 | End: 2024-05-22

## 2024-05-21 NOTE — PATIENT INSTRUCTIONS
ASSESSMENT AND PLAN  1. Cerebrovascular accident (CVA), unspecified mechanism (H)  With new diploplia (somewhat improved with time and vision adjustment), left sided weakness (resolved).  Receiving PT/OT at [facility] where she is living. Continues on daily aspirin.     2. Alzheimer's disease, unspecified (CODE) (H)  Check labs for underlying cause of memory decrease and anemia as below.   - TSH with free T4 reflex; Future    3. Hypovitaminosis D  Increase vitamin D to 2,000 units/day and re-check vitamin d level in 2-3 months.   - vitamin D3 (CHOLECALCIFEROL) 50 mcg (2000 units) tablet; Take 1 tablet (50 mcg) by mouth daily  Dispense: 90 tablet; Refill: 3    4. Anemia, unspecified type  Eval labs as below.   - CBC with Platelets & Differential; Future  - TSH with free T4 reflex; Future  - Vitamin B12; Future  - Folate; Future  - Lab Blood Morphology Pathologist Review; Future  - Ferritin; Future  - ascorbic acid (VITAMIN C) 250 MG CHEW chewable tablet; Take 1 tablet (250 mg) by mouth daily With iron supplement  Dispense: 36 tablet; Refill: 3    5. Iron deficiency anemia, unspecified iron deficiency anemia type   - increase ferrous sulfate and vitamin C to daily from three times weekly.       - ferrous sulfate (FE TABS) 325 (65 Fe) MG EC tablet; Take 1 tablet (325 mg) by mouth daily With vitamin C  Dispense: 36 tablet; Refill: 3  - ascorbic acid (VITAMIN C) 250 MG CHEW chewable tablet; Take 1 tablet (250 mg) by mouth daily With iron supplement  Dispense: 36 tablet; Refill: 3    6. Need for shingles/covid/pneumonia vaccine  Will plan to get shingles from pharmacy.  Covid and pneumonia boosters today.     Follow up one month annual wellness/check in.

## 2024-05-21 NOTE — TELEPHONE ENCOUNTER
Forms/Letter Request    Type of form/letter: two orders, three papers to sign:  Home Health Certification    Order # [id number]  Certification period 5/8/24 to 7/6/24    2. OT   Order # [id number]    Do we have the form/letter: Yes: in OLIVER's office    Who is the form from? [facility] (if other please explain)    Where did/will the form come from? form was faxed in    When is form/letter needed by: asap    How would you like the form/letter returned: Fax : [phone]

## 2024-05-21 NOTE — PROGRESS NOTES
Assessment & Plan     Here with daughter    ASSESSMENT AND PLAN  1. Cerebrovascular accident (CVA), unspecified mechanism (H)  With new diploplia (somewhat improved with time and vision adjustment), left sided weakness (resolved).  Receiving PT/OT at [facility] where she is living. Continues on daily aspirin.     2. Alzheimer's disease, unspecified (CODE) (H)  Check labs for underlying cause of memory decrease and anemia as below.   - TSH with free T4 reflex; Future    3. Hypovitaminosis D  Increase vitamin D to 2,000 units/day and re-check vitamin d level in 2-3 months.   - vitamin D3 (CHOLECALCIFEROL) 50 mcg (2000 units) tablet; Take 1 tablet (50 mcg) by mouth daily  Dispense: 90 tablet; Refill: 3    4. Anemia, unspecified type  Eval labs as below.   - CBC with Platelets & Differential; Future  - TSH with free T4 reflex; Future  - Vitamin B12; Future  - Folate; Future  - Lab Blood Morphology Pathologist Review; Future  - Ferritin; Future  - ascorbic acid (VITAMIN C) 250 MG CHEW chewable tablet; Take 1 tablet (250 mg) by mouth daily With iron supplement  Dispense: 36 tablet; Refill: 3    5. Iron deficiency anemia, unspecified iron deficiency anemia type   - increase ferrous sulfate and vitamin C to daily from three times weekly.       - ferrous sulfate (FE TABS) 325 (65 Fe) MG EC tablet; Take 1 tablet (325 mg) by mouth daily With vitamin C  Dispense: 36 tablet; Refill: 3  - ascorbic acid (VITAMIN C) 250 MG CHEW chewable tablet; Take 1 tablet (250 mg) by mouth daily With iron supplement  Dispense: 36 tablet; Refill: 3    6. Need for shingles/covid/pneumonia vaccine  Will plan to get shingles from pharmacy.  Covid and pneumonia boosters today.     Follow up one month annual wellness/check in.     The longitudinal plan of care for the diagnosis(es)/condition(s) as documented were addressed during this visit. Due to the added complexity in care, I will continue to support Keysha in the subsequent  management and with ongoing continuity of care.      Reviewed last one year labs, EMERGENCY ROOM records/report and CT imaging reports.       35 minutes spent by me on the date of the encounter doing chart review, history and exam, documentation and further activities per the note    MED REC REQUIRED  Post Medication Reconciliation Status:  Discharge medications reconciled and changed, see notes/orders        Subjective   Keysha is a 89 year old, presenting for the following health issues:  FU After ER Visit        5/21/2024     7:24 AM   Additional Questions   Roomed by Bisi DALY MA   Accompanied by Maria         5/21/2024     7:24 AM   Patient Reported Additional Medications   Patient reports taking the following new medications none     History of Present Illness       Reason for visit:  Follow-up from stroke  Symptom onset:  More than a month  Symptoms include:  Double vision, tingling in left arm and hand, weakess in left arm and left leg  Symptom intensity:  Moderate  Symptom progression:  Improving  Had these symptoms before:  No    She eats 2-3 servings of fruits and vegetables daily.She consumes 2 sweetened beverage(s) daily.She exercises with enough effort to increase her heart rate 9 or less minutes per day.  She exercises with enough effort to increase her heart rate 3 or less days per week.   She is taking medications regularly.               Objective    /67   Pulse 85   Temp 97.9  F (36.6  C) (Temporal)   Resp 16   Wt 45.8 kg (101 lb)   SpO2 98%   BMI 18.47 kg/m    Body mass index is 18.47 kg/m .  Physical Exam   Comfortable sitting in chair.  Clear speech.  Interactive with discussion although looks to her daughter for details. Left eye glass with patch.     Reviewed recent labs including hemoglobin 8.x low ferritin, low vitamin D.         Signed Electronically by: Joel Daniel Wegener, MD

## 2024-05-22 ENCOUNTER — TELEPHONE (OUTPATIENT)
Dept: FAMILY MEDICINE | Facility: CLINIC | Age: 89
End: 2024-05-22
Payer: MEDICARE

## 2024-05-22 DIAGNOSIS — E55.9 HYPOVITAMINOSIS D: ICD-10-CM

## 2024-05-22 DIAGNOSIS — D64.9 ANEMIA, UNSPECIFIED TYPE: ICD-10-CM

## 2024-05-22 DIAGNOSIS — D50.9 IRON DEFICIENCY ANEMIA, UNSPECIFIED IRON DEFICIENCY ANEMIA TYPE: ICD-10-CM

## 2024-05-22 LAB
PATH REPORT.COMMENTS IMP SPEC: NORMAL
PATH REPORT.COMMENTS IMP SPEC: NORMAL
PATH REPORT.FINAL DX SPEC: NORMAL
PATH REPORT.MICROSCOPIC SPEC OTHER STN: NORMAL
PATH REPORT.MICROSCOPIC SPEC OTHER STN: NORMAL

## 2024-05-22 NOTE — TELEPHONE ENCOUNTER
OLIVER,    [facility] calling,    Patient seen 5/21 for office visit.  Prescribed Vitamin C, Ferrous Sulfate, and Vitamin D3.  [facility] does not have records of new medications.    Requesting orders be sent to [facility].  Fax: [phone]    Orders pended if approved    Kenneth RAGSDALE RN

## 2024-05-23 ENCOUNTER — TELEPHONE (OUTPATIENT)
Dept: FAMILY MEDICINE | Facility: CLINIC | Age: 89
End: 2024-05-23
Payer: MEDICARE

## 2024-05-23 ENCOUNTER — MEDICAL CORRESPONDENCE (OUTPATIENT)
Dept: HEALTH INFORMATION MANAGEMENT | Facility: CLINIC | Age: 89
End: 2024-05-23
Payer: MEDICARE

## 2024-05-23 DIAGNOSIS — K21.9 GASTROESOPHAGEAL REFLUX DISEASE WITHOUT ESOPHAGITIS: ICD-10-CM

## 2024-05-23 RX ORDER — FERROUS SULFATE 325(65) MG
325 TABLET, DELAYED RELEASE (ENTERIC COATED) ORAL DAILY
Qty: 90 TABLET | Refills: 3 | Status: SHIPPED | OUTPATIENT
Start: 2024-05-23 | End: 2024-07-09

## 2024-05-23 RX ORDER — CHOLECALCIFEROL (VITAMIN D3) 50 MCG
1 TABLET ORAL DAILY
Qty: 90 TABLET | Refills: 3 | Status: SHIPPED | OUTPATIENT
Start: 2024-05-23

## 2024-05-23 RX ORDER — ASCORBIC ACID 250 MG
250 TABLET,CHEWABLE ORAL DAILY
Qty: 90 TABLET | Refills: 3 | Status: SHIPPED | OUTPATIENT
Start: 2024-05-23 | End: 2024-07-09

## 2024-05-23 RX ORDER — CALCIUM CARBONATE 500 MG/1
1 TABLET, CHEWABLE ORAL DAILY
COMMUNITY
End: 2024-06-26

## 2024-05-23 NOTE — TELEPHONE ENCOUNTER
OLIVER,    [facility] calling,    Requesting decrease to current calcium carbonate administration to just mornings to save on cost.    Thanks,  Kenneth RAGSDALE RN

## 2024-05-24 NOTE — TELEPHONE ENCOUNTER
Forms/Letter Request    Type of form/letter: OTHER: physician orders  calcium chews, can this be daily?  V.O. change calcium chew tabs to AM only, discontinue PM dose  Needs M.D. signature        Do we have the form/letter: Yes: in JW's box    Who is the form from? [facility] (if other please explain)    Where did/will the form come from? form was faxed in    When is form/letter needed by: asap    How would you like the form/letter returned: Fax : [phone]

## 2024-05-27 RX ORDER — LEVOTHYROXINE SODIUM 25 UG/1
25 TABLET ORAL DAILY
Qty: 30 TABLET | Refills: 1 | Status: SHIPPED | OUTPATIENT
Start: 2024-05-27

## 2024-05-30 ENCOUNTER — MEDICAL CORRESPONDENCE (OUTPATIENT)
Dept: HEALTH INFORMATION MANAGEMENT | Facility: CLINIC | Age: 89
End: 2024-05-30
Payer: MEDICARE

## 2024-05-31 ENCOUNTER — MEDICAL CORRESPONDENCE (OUTPATIENT)
Dept: HEALTH INFORMATION MANAGEMENT | Facility: CLINIC | Age: 89
End: 2024-05-31
Payer: MEDICARE

## 2024-06-11 ENCOUNTER — TELEPHONE (OUTPATIENT)
Dept: FAMILY MEDICINE | Facility: CLINIC | Age: 89
End: 2024-06-11
Payer: MEDICARE

## 2024-06-11 ENCOUNTER — MEDICAL CORRESPONDENCE (OUTPATIENT)
Dept: HEALTH INFORMATION MANAGEMENT | Facility: CLINIC | Age: 89
End: 2024-06-11
Payer: MEDICARE

## 2024-06-11 NOTE — TELEPHONE ENCOUNTER
Forms/Letter Request    Type of form/letter: OTHER: moving OT 11 week of 6/17/2024 OT eff 6/2/2024 [id number]        Do we have the form/letter: Yes: order    Who is the form from? Home care    Where did/will the form come from? form was faxed in    When is form/letter needed by: asap    How would you like the form/letter returned: Fax : [phone]    Patient Notified form requests are processed in 5-7 business days:Yes    Could we send this information to you in [employer] or would you prefer to receive a phone call?:   No preference   Okay to leave a detailed message?: Yes at Other phone number:  [phone]

## 2024-06-12 RX ORDER — CALCIUM CARBONATE 500 MG/1
TABLET, CHEWABLE ORAL
Qty: 90 TABLET | Refills: 97 | OUTPATIENT
Start: 2024-06-12

## 2024-06-19 SDOH — HEALTH STABILITY: PHYSICAL HEALTH: ON AVERAGE, HOW MANY DAYS PER WEEK DO YOU ENGAGE IN MODERATE TO STRENUOUS EXERCISE (LIKE A BRISK WALK)?: 0 DAYS

## 2024-06-19 SDOH — HEALTH STABILITY: PHYSICAL HEALTH: ON AVERAGE, HOW MANY MINUTES DO YOU ENGAGE IN EXERCISE AT THIS LEVEL?: 10 MIN

## 2024-06-19 ASSESSMENT — PATIENT HEALTH QUESTIONNAIRE - PHQ9
SUM OF ALL RESPONSES TO PHQ QUESTIONS 1-9: 5
10. IF YOU CHECKED OFF ANY PROBLEMS, HOW DIFFICULT HAVE THESE PROBLEMS MADE IT FOR YOU TO DO YOUR WORK, TAKE CARE OF THINGS AT HOME, OR GET ALONG WITH OTHER PEOPLE: SOMEWHAT DIFFICULT
SUM OF ALL RESPONSES TO PHQ QUESTIONS 1-9: 5

## 2024-06-19 ASSESSMENT — SOCIAL DETERMINANTS OF HEALTH (SDOH): HOW OFTEN DO YOU GET TOGETHER WITH FRIENDS OR RELATIVES?: MORE THAN THREE TIMES A WEEK

## 2024-06-24 ENCOUNTER — OFFICE VISIT (OUTPATIENT)
Dept: FAMILY MEDICINE | Facility: CLINIC | Age: 89
End: 2024-06-24
Payer: MEDICARE

## 2024-06-24 VITALS
TEMPERATURE: 97.5 F | HEART RATE: 67 BPM | WEIGHT: 100 LBS | BODY MASS INDEX: 19.63 KG/M2 | OXYGEN SATURATION: 98 % | SYSTOLIC BLOOD PRESSURE: 122 MMHG | RESPIRATION RATE: 14 BRPM | HEIGHT: 60 IN | DIASTOLIC BLOOD PRESSURE: 68 MMHG

## 2024-06-24 DIAGNOSIS — R79.89 LOW SERUM T4 LEVEL: ICD-10-CM

## 2024-06-24 DIAGNOSIS — Z86.73 HISTORY OF STROKE: ICD-10-CM

## 2024-06-24 DIAGNOSIS — E03.9 HYPOTHYROIDISM, UNSPECIFIED TYPE: ICD-10-CM

## 2024-06-24 DIAGNOSIS — R29.6 FALLS FREQUENTLY: ICD-10-CM

## 2024-06-24 DIAGNOSIS — G30.9 ALZHEIMER'S DISEASE, UNSPECIFIED (CODE) (H): ICD-10-CM

## 2024-06-24 DIAGNOSIS — Z00.00 ENCOUNTER FOR MEDICARE ANNUAL WELLNESS EXAM: Primary | ICD-10-CM

## 2024-06-24 LAB — TSH SERPL DL<=0.005 MIU/L-ACNC: 3.19 UIU/ML (ref 0.3–4.2)

## 2024-06-24 PROCEDURE — 84443 ASSAY THYROID STIM HORMONE: CPT | Performed by: FAMILY MEDICINE

## 2024-06-24 PROCEDURE — G0438 PPPS, INITIAL VISIT: HCPCS | Performed by: FAMILY MEDICINE

## 2024-06-24 PROCEDURE — 36415 COLL VENOUS BLD VENIPUNCTURE: CPT | Performed by: FAMILY MEDICINE

## 2024-06-24 ASSESSMENT — PAIN SCALES - GENERAL: PAINLEVEL: NO PAIN (0)

## 2024-06-24 NOTE — PATIENT INSTRUCTIONS
"            Patient Education   Preventive Care Advice   This is general advice we often give to help people stay healthy. Your care team may have specific advice just for you. Please talk to your care team about your own preventive care needs.  Lifestyle  Exercise at least 150 minutes each week (30 minutes a day, 5 days a week).  Do muscle strengthening activities 2 days a week. These help control your weight and prevent disease.  No smoking.  Wear sunscreen to prevent skin cancer.  Have your home tested for radon every 2 to 5 years. Radon is a colorless, odorless gas that can harm your lungs. To learn more, go to www.health.[facility].mn.us and search for \"Radon in Homes.\"  Keep guns unloaded and locked up in a safe place like a safe or gun vault, or, use a gun lock and hide the keys. Always lock away bullets separately. To learn more, visit [place].[place].gov and search for \"safe gun storage.\"  Nutrition  Eat 5 or more servings of fruits and vegetables each day.  Try wheat bread, brown rice and whole grain pasta (instead of white bread, rice, and pasta).  Get enough calcium and vitamin D. Check the label on foods and aim for 100% of the RDA (recommended daily allowance).  Regular exams  Have a dental exam and cleaning every 6 months.  See your health care team every year to talk about:  Any changes in your health.  Any medicines your care team has prescribed.  Preventive care, family planning, and ways to prevent chronic diseases.  Shots (vaccines)   HPV shots (up to age 26), if you've never had them before.  Hepatitis B shots (up to age 59), if you've never had them before.  COVID-19 shot: Get this shot when it's due.  Flu shot: Get a flu shot every year.  Tetanus shot: Get a tetanus shot every 10 years.  Pneumococcal, hepatitis A, and RSV shots: Ask your care team if you need these based on your risk.  Shingles shot (for age 50 and up).  General health tests  Diabetes screening:  Starting at age 35, Get screened for " diabetes at least every 3 years.  If you are younger than age 35, ask your care team if you should be screened for diabetes.  Cholesterol test: At age 39, start having a cholesterol test every 5 years, or more often if advised.  Bone density scan (DEXA): At age 50, ask your care team if you should have this scan for osteoporosis (brittle bones).  Hepatitis C: Get tested at least once in your life.  Abdominal aortic aneurysm screening: Talk to your doctor about having this screening if you:  Have ever smoked; and  Are biologically male; and  Are between the ages of 65 and 75.  STIs (sexually transmitted infections)  Before age 24: Ask your care team if you should be screened for STIs.  After age 24: Get screened for STIs if you're at risk. You are at risk for STIs (including HIV) if:  You are sexually active with more than one person.  You don't use condoms every time.  You or a partner was diagnosed with a sexually transmitted infection.  If you are at risk for HIV, ask about PrEP medicine to prevent HIV.  Get tested for HIV at least once in your life, whether you are at risk for HIV or not.  Cancer screening tests  Cervical cancer screening: If you have a cervix, begin getting regular cervical cancer screening tests at age 21. Most people who have regular screenings with normal results can stop after age 65. Talk about this with your provider.  Breast cancer scan (mammogram): If you've ever had breasts, begin having regular mammograms starting at age 40. This is a scan to check for breast cancer.  Colon cancer screening: It is important to start screening for colon cancer at age 45.  Have a colonoscopy test every 10 years (or more often if you're at risk) Or, ask your provider about stool tests like a FIT test every year or Cologuard test every 3 years.  To learn more about your testing options, visit: www.[employer]/866036.pdf.  For help making a decision, visit: gabby/qy80137.  Prostate cancer screening test:  If you have a prostate and are age 55 to 69, ask your provider if you would benefit from a yearly prostate cancer screening test.  Lung cancer screening: If you are a current or former smoker age 50 to 80, ask your care team if ongoing lung cancer screenings are right for you.  For informational purposes only. Not to replace the advice of your health care provider. Copyright   2023 [facility]. All rights reserved. Clinically reviewed by the [facility]. [employer] 204678 - REV 04/24.  Learning About Activities of Daily Living  What are activities of daily living?     Activities of daily living (ADLs) are the basic self-care tasks you do every day. These include eating, bathing, dressing, and moving around.  As you age, and if you have health problems, you may find that it's harder to do some of these tasks. If so, your doctor can suggest ideas that may help.  To measure what kind of help you may need, your doctor will ask how well you are able to do ADLs. Let your doctor know if there are any tasks that you are having trouble doing. This is an important first step to getting help. And when you have the help you need, you can stay as independent as possible.  How will a doctor assess your ADLs?  Asking about ADLs is part of a routine health checkup your doctor will likely do as you age. Your health check might be done in a doctor's office, in your home, or at a hospital. The goal is to find out if you are having any problems that could make it hard to care for yourself or that make it unsafe for you to be on your own.  To measure your ADLs, your doctor will ask how hard it is for you to do routine tasks. Your doctor may also want to know if you have changed the way you do a task because of a health problem. Your doctor may watch how you:  Walk back and forth.  Keep your balance while you stand or walk.  Move from sitting to standing or from a bed to a chair.  Button or unbutton  a shirt or sweater.  Remove and put on your shoes.  It's common to feel a little worried or anxious if you find you can't do all the things you used to be able to do. Talking with your doctor about ADLs is a way to make sure you're as safe as possible and able to care for yourself as well as you can. You may want to bring a caregiver, friend, or family member to your checkup. They can help you talk to your doctor.  Follow-up care is a key part of your treatment and safety. Be sure to make and go to all appointments, and call your doctor if you are having problems. It's also a good idea to know your test results and keep a list of the medicines you take.  Current as of: October 24, 2023               Content Version: 14.0    2577-1947 [employer].   Care instructions adapted under license by your healthcare professional. If you have questions about a medical condition or this instruction, always ask your healthcare professional. [employer] disclaims any warranty or liability for your use of this information.      Preventing Falls: Care Instructions  Injuries and health problems such as trouble walking or poor eyesight can increase your risk of falling. So can some medicines. But there are things you can do to help prevent falls. You can exercise to get stronger. You can also arrange your home to make it safer.    Talk to your doctor about the medicines you take. Ask if any of them increase the risk of falls and whether they can be changed or stopped.   Try to exercise regularly. It can help improve your strength and balance. This can help lower your risk of falling.     Practice fall safety and prevention.    Wear low-heeled shoes that fit well and give your feet good support. Talk to your doctor if you have foot problems that make this hard.  Carry a cellphone or wear a medical alert device that you can use to call for help.  Use stepladders instead of chairs to reach high objects. Don't  "climb if you're at risk for falls. Ask for help, if needed.  Wear the correct eyeglasses, if you need them.    Make your home safer.    Remove rugs, cords, clutter, and furniture from walkways.  Keep your house well lit. Use night-lights in hallways and bathrooms.  Install and use sturdy handrails on stairways.  Wear nonskid footwear, even inside. Don't walk barefoot or in socks without shoes.    Be safe outside.    Use handrails, curb cuts, and ramps whenever possible.  Keep your hands free by using a shoulder bag or backpack.  Try to walk in well-lit areas. Watch out for uneven ground, changes in pavement, and debris.  Be careful in the winter. Walk on the grass or gravel when sidewalks are slippery. Use de-icer on steps and walkways. Add non-slip devices to shoes.    Put grab bars and nonskid mats in your shower or tub and near the toilet. Try to use a shower chair or bath bench when bathing.   Get into a tub or shower by putting in your weaker leg first. Get out with your strong side first. Have a phone or medical alert device in the bathroom with you.   Where can you learn more?  Go to https://www.[employer].net/patiented  Enter G117 in the search box to learn more about \"Preventing Falls: Care Instructions.\"  Current as of: July 17, 2023               Content Version: 14.0    3272-4646 [employer].   Care instructions adapted under license by your healthcare professional. If you have questions about a medical condition or this instruction, always ask your healthcare professional. [employer] disclaims any warranty or liability for your use of this information.      Hearing Loss: Care Instructions  Overview     Hearing loss is a sudden or slow decrease in how well you hear. It can range from slight to profound. Permanent hearing loss can occur with aging. It also can happen when you are exposed long-term to loud noise. Examples include listening to loud music, riding motorcycles, or " being around other loud machines.  Hearing loss can affect your work and home life. It can make you feel lonely or depressed. You may feel that you have lost your independence. But hearing aids and other devices can help you hear better and feel connected to others.  Follow-up care is a key part of your treatment and safety. Be sure to make and go to all appointments, and call your doctor if you are having problems. It's also a good idea to know your test results and keep a list of the medicines you take.  How can you care for yourself at home?  Avoid loud noises whenever possible. This helps keep your hearing from getting worse.  Always wear hearing protection around loud noises.  Wear a hearing aid as directed.  A professional can help you pick a hearing aid that will work best for you.  You can also get hearing aids over the counter for mild to moderate hearing loss.  Have hearing tests as your doctor suggests. They can show whether your hearing has changed. Your hearing aid may need to be adjusted.  Use other devices as needed. These may include:  Telephone amplifiers and hearing aids that can connect to a television, stereo, radio, or microphone.  Devices that use lights or vibrations. These alert you to the doorbell, a ringing telephone, or a baby monitor.  Television closed-captioning. This shows the words at the bottom of the screen. Most new TVs can do this.  TTY (text telephone). This lets you type messages back and forth on the telephone instead of talking or listening. These devices are also called TDD. When messages are typed on the keyboard, they are sent over the phone line to a receiving TTY. The message is shown on a monitor.  Use text messaging, social media, and email if it is hard for you to communicate by telephone.  Try to learn a listening technique called speechreading. It is not lipreading. You pay attention to people's gestures, expressions, posture, and tone of voice. These clues can help  "you understand what a person is saying. Face the person you are talking to, and have them face you. Make sure the lighting is good. You need to see the other person's face clearly.  Think about counseling if you need help to adjust to your hearing loss.  When should you call for help?  Watch closely for changes in your health, and be sure to contact your doctor if:    You think your hearing is getting worse.     You have new symptoms, such as dizziness or nausea.   Where can you learn more?  Go to https://www.[employer].net/patiented  Enter R798 in the search box to learn more about \"Hearing Loss: Care Instructions.\"  Current as of: September 27, 2023               Content Version: 14.0    0850-[address].   Care instructions adapted under license by your healthcare professional. If you have questions about a medical condition or this instruction, always ask your healthcare professional. [address] disclaims any warranty or liability for your use of this information.      Learning About Depression Screening  What is depression screening?  Depression screening is a way to see if you have depression symptoms. It may be done by a doctor or counselor. It's often part of a routine checkup. That's because your mental health is just as important as your physical health.  Depression is a mental health condition that affects how you feel, think, and act. You may:  Have less energy.  Lose interest in your daily activities.  Feel sad and grouchy for a long time.  Depression is very common. It affects people of all ages.  Many things can lead to depression. Some people become depressed after they have a stroke or find out they have a major illness like cancer or heart disease. The death of a loved one or a breakup may lead to depression. It can run in families. Most experts believe that a combination of inherited genes and stressful life events can cause it.  What happens during screening?  You " "may be asked to fill out a form about your depression symptoms. You and the doctor will discuss your answers. The doctor may ask you more questions to learn more about how you think, act, and feel.  What happens after screening?  If you have symptoms of depression, your doctor will talk to you about your options.  Doctors usually treat depression with medicines or counseling. Often, combining the two works best. Many people don't get help because they think that they'll get over the depression on their own. But people with depression may not get better unless they get treatment.  The cause of depression is not well understood. There may be many factors involved. But if you have depression, it's not your fault.  A serious symptom of depression is thinking about death or suicide. If you or someone you care about talks about this or about feeling hopeless, get help right away.  It's important to know that depression can be treated. Medicine, counseling, and self-care may help.  Where can you learn more?  Go to https://www.AskforTask.net/patiented  Enter T185 in the search box to learn more about \"Learning About Depression Screening.\"  Current as of: June 24, 2023               Content Version: 14.0    4650-3878 [employer].   Care instructions adapted under license by your healthcare professional. If you have questions about a medical condition or this instruction, always ask your healthcare professional. [employer] disclaims any warranty or liability for your use of this information.         "

## 2024-06-24 NOTE — PROGRESS NOTES
Preventive Care Visit  [facility]  Joel Daniel Wegener, MD, Family Medicine  Jun 24, 2024      Assessment & Plan     Encounter for Medicare annual wellness exam  Wellness Visit Notes:  - here with daughter today.  Living in senior living   -Mammo screenings discontinued.   -DEXA done 1/26/2022 (impression: osteoporosis; most negative T-score of -5.2 at L forearm).  Not desiring treatment at this time   -Colon cancer screening discontinued.   -Immunizations: Pt is due for Shingles vaccine, however advised pt to complete Shingles vaccine at local pharmacy given Medicare insurance.   -Education: Pt education provided on Hearing Concerns, ADLs, Mental Health, and Fall Prevention  -Derm: Does patient regularly see dermatologist? No although does not have suspicious moles.     Low serum T4 level  Re-check today.   - TSH with free T4 reflex; Future  - TSH with free T4 reflex    Hypothyroidism, unspecified type  Re-check.   - TSH with free T4 reflex; Future  - TSH with free T4 reflex      Falls frequently due to h/o stroke and dementia:  no current injuries.  Plan physical therapy if continues as recovering from stroke.     Patient has been advised of split billing requirements and indicates understanding: Yes        Counseling  Appropriate preventive services were addressed with this patient via screening, questionnaire, or discussion as appropriate for fall prevention, nutrition, physical activity, Tobacco-use cessation, weight loss and cognition.  Checklist reviewing preventive services available has been given to the patient.  The patient's PHQ-9 score is consistent with mild depression. She was provided with information regarding depression.           Melody Chopra is a 89 year old, presenting for the following:  Physical (AWV)        Health Care Directive  Patient has a Health Care Directive on file  Advance care planning document is on file and is current.    HPI                         6/19/2024   General Health   How would you rate your overall physical health? (!) FAIR   Feel stress (tense, anxious, or unable to sleep) Only a little      (!) STRESS CONCERN      6/19/2024   Nutrition   Diet: Regular (no restrictions)            6/19/2024   Exercise   Days per week of moderate/strenous exercise 0 days   Average minutes spent exercising at this level 10 min      (!) EXERCISE CONCERN      6/19/2024   Social Factors   Frequency of gathering with friends or relatives More than three times a week   Worry food won't last until get money to buy more No   Food not last or not have enough money for food? No   Do you have housing? (Housing is defined as stable permanent housing and does not include staying ouside in a car, in a tent, in an abandoned building, in an overnight shelter, or couch-surfing.) Yes   Are you worried about losing your housing? No   Lack of transportation? No   Unable to get utilities (heat,electricity)? No            6/24/2024   Fall Risk   Gait Speed Test (Document in seconds) 6.53               6/19/2024   Activities of Daily Living- Home Safety   Needs help with the following daily activites Transportation    Shopping    Preparing meals    Housework    Bathing    Laundry    Medication administration    Money management   Safety concerns in the home None of the above       Multiple values from one day are sorted in reverse-chronological order         6/19/2024   Dental   Dentist two times every year? Yes            6/19/2024   Hearing Screening   Hearing concerns? (!) I FEEL THAT PEOPLE ARE MUMBLING OR NOT SPEAKING CLEARLY.    (!) I NEED TO ASK PEOPLE TO SPEAK UP OR REPEAT THEMSELVES.    (!) IT'S HARD TO FOLLOW A CONVERSATION IN A NOISY RESTAURANT OR CROWDED ROOM.    (!) TROUBLE UNDESTANDING A SPEAKER IN A PUBLIC MEETING OR [employer] SERVICE.    (!) TROUBLE FOLLOWING DIALOGUE IN THE THEATHER.    (!) TROUBLE UNDERSTANDING SOFT OR WHISPERED SPEECH.    (!) TROUBLE UNDERSTANDING SPEECH  ON THE TELEPHONE       Multiple values from one day are sorted in reverse-chronological order         2024   Driving Risk Screening   Patient/family members have concerns about driving No            2024   General Alertness/Fatigue Screening   Have you been more tired than usual lately? No            2024   Urinary Incontinence Screening   Bothered by leaking urine in past 6 months No            2024   TB Screening   Were you born outside of the [place]? No          Today's PHQ-9 Score:       2024    12:20 PM   PHQ-9 SCORE   PHQ-9 Total Score MyChart 5 (Mild depression)   PHQ-9 Total Score 5         2024   Substance Use   Alcohol more than 3/day or more than 7/wk No   Do you have a current opioid prescription? No   How severe/bad is pain from 1 to 10? 6/10   Do you use any other substances recreationally? No        Social History     Tobacco Use    Smoking status: Former     Current packs/day: 0.00     Average packs/day: 0.4 packs/day for 92.4 years (33.0 ttl pk-yrs)     Types: Cigarettes     Start date: 1947     Quit date:      Years since quittin.4    Smokeless tobacco: Never   Substance Use Topics    Alcohol use: Not Currently    Drug use: Never          Mammogram Screening - Mammography discussed and declined          Reviewed and updated as needed this visit by Provider                    Past Medical History:   Diagnosis Date    Cerebral infarction (H)     Depressive disorder     Thyroid disease      Past Surgical History:   Procedure Laterality Date    ABDOMEN SURGERY      BACK SURGERY      COLONOSCOPY      EYE SURGERY      GENITOURINARY SURGERY      GYN SURGERY       Current providers sharing in care for this patient include:  Patient Care Team:  Wegener, Joel Daniel Irwin, MD as PCP - General (Family Medicine)  Wegener, Joel Daniel Irwin, MD as Assigned PCP  Meg Morelos OD as Assigned Surgical Provider    The following health maintenance items are reviewed in Epic  and correct as of today:  Health Maintenance   Topic Date Due    ZOSTER IMMUNIZATION (1 of 2) Never done    PHQ-9  12/24/2024    ANNUAL REVIEW OF HM ORDERS  01/17/2025    TSH W/FREE T4 REFLEX  05/21/2025    MEDICARE ANNUAL WELLNESS VISIT  06/24/2025    FALL RISK ASSESSMENT  06/24/2025    DTAP/TDAP/TD IMMUNIZATION (5 - Td or Tdap) 09/10/2028    ADVANCE CARE PLANNING  06/24/2029    DEPRESSION ACTION PLAN  Completed    INFLUENZA VACCINE  Completed    Pneumococcal Vaccine: 65+ Years  Completed    RSV VACCINE (Pregnancy & 60+)  Completed    COVID-19 Vaccine  Completed    IPV IMMUNIZATION  Aged Out    HPV IMMUNIZATION  Aged Out    MENINGITIS IMMUNIZATION  Aged Out    RSV MONOCLONAL ANTIBODY  Aged Out         Review of Systems  Constitutional, neuro, ENT, endocrine, pulmonary, cardiac, gastrointestinal, genitourinary, musculoskeletal, integument and psychiatric systems are negative, except as otherwise noted.     Objective    Exam  /68 (BP Location: Right arm, Patient Position: Sitting, Cuff Size: Adult Regular)   Pulse 67   Temp 97.5  F (36.4  C) (Temporal)   Resp 14   Ht 1.524 m (5')   Wt 45.4 kg (100 lb)   SpO2 98%   BMI 19.53 kg/m     Estimated body mass index is 19.53 kg/m  as calculated from the following:    Height as of this encounter: 1.524 m (5').    Weight as of this encounter: 45.4 kg (100 lb).    Physical Exam  GENERAL: alert and no distress  EYES: Eyes grossly normal to inspection, PERRL and conjunctivae and sclerae normal  HENT: ear canals and TM's normal, nose and mouth without ulcers or lesions  NECK: no adenopathy, no asymmetry, masses, or scars  RESP: lungs clear to auscultation - no rales, rhonchi or wheezes  CV: regular rate and rhythm, normal S1 S2, no S3 or S4, no murmur, click or rub, no peripheral edema  ABDOMEN: soft, nontender, no hepatosplenomegaly, no masses and bowel sounds normal  MS: no gross musculoskeletal defects noted, no edema  SKIN: no suspicious lesions or rashes  NEURO:  Normal strength and tone, mentation intact and speech normal  PSYCH: mentation appears normal, affect normal/bright        6/24/2024   Mini Cog   Mini-Cog Not Completed (choose reason) Known dementia                 Signed Electronically by: Joel Daniel Wegener, MD    Answers submitted by the patient for this visit:  Patient Health Questionnaire (Submitted on 6/19/2024)  If you checked off any problems, how difficult have these problems made it for you to do your work, take care of things at home, or get along with other people?: Somewhat difficult  PHQ9 TOTAL SCORE: 5

## 2024-06-24 NOTE — LETTER
My Depression Action Plan  Name: Keysha Anders   Date of Birth 12/31/1929  Date: 6/24/2024    My doctor: Wegener, Joel Daniel Irwin   My clinic: [facility]  [address]  [place] [place] [facility] [address] [phone]            GREEN    ZONE   Good Control    What it looks like:   Things are going generally well. You have normal ups and downs. You may even feel depressed from time to time, but bad moods usually last less than a day.   What you need to do:  Continue to care for yourself (see self care plan)  Check your depression survival kit and update it as needed  Follow your physician s recommendations including any medication.  Do not stop taking medication unless you consult with your physician first.             YELLOW         ZONE Getting Worse    What it looks like:   Depression is starting to interfere with your life.   It may be hard to get out of bed; you may be starting to isolate yourself from others.  Symptoms of depression are starting to last most all day and this has happened for several days.   You may have suicidal thoughts but they are not constant.   What you need to do:     Call your care team. Your response to treatment will improve if you keep your care team informed of your progress. Yellow periods are signs an adjustment may need to be made.     Continue your self-care.  Just get dressed and ready for the day.  Don't give yourself time to talk yourself out of it.    Talk to someone in your support network.    Open up your Depression Self-Care Plan/Wellness Kit.             RED    ZONE Medical Alert - Get Help    What it looks like:   Depression is seriously interfering with your life.   You may experience these or other symptoms: You can t get out of bed most days, can t work or engage in other necessary activities, you have trouble taking care of basic hygiene, or basic responsibilities, thoughts of suicide or death that will not go away,  self-injurious behavior.     What you need to do:  Call your care team and request a same-day appointment. If they are not available (weekends or after hours) call your local crisis line, emergency room or 911.          Depression Self-Care Plan / Wellness Kit    Many people find that medication and therapy are helpful treatments for managing depression. In addition, making small changes to your everyday life can help to boost your mood and improve your wellbeing. Below are some tips for you to consider. Be sure to talk with your medical provider and/or behavioral health consultant if your symptoms are worsening or not improving.     Sleep   Sleep hygiene  means all of the habits that support good, restful sleep. It includes maintaining a consistent bedtime and wake time, using your bedroom only for sleeping or sex, and keeping the bedroom dark and free of distractions like a computer, smartphone, or television.     Develop a Healthy Routine  Maintain good hygiene. Get out of bed in the morning, make your bed, brush your teeth, take a shower, and get dressed. Don t spend too much time viewing media that makes you feel stressed. Find time to relax each day.    Exercise  Get some form of exercise every day. This will help reduce pain and release endorphins, the  feel good  chemicals in your brain. It can be as simple as just going for a walk or doing some gardening, anything that will get you moving.      Diet  Strive to eat healthy foods, including fruits and vegetables. Drink plenty of water. Avoid excessive sugar, caffeine, alcohol, and other mood-altering substances.     Stay Connected with Others  Stay in touch with friends and family members.    Manage Your Mood  Try deep breathing, massage therapy, biofeedback, or meditation. Take part in fun activities when you can. Try to find something to smile about each day.     Psychotherapy  Be open to working with a therapist if your provider recommends it.      Medication  Be sure to take your medication as prescribed. Most anti-depressants need to be taken every day. It usually takes several weeks for medications to work. Not all medicines work for all people. It is important to follow-up with your provider to make sure you have a treatment plan that is working for you. Do not stop your medication abruptly without first discussing it with your provider.    Crisis Resources   These hotlines are for both adults and children. They and are open 24 hours a day, 7 days a week unless noted otherwise.    National Suicide Prevention Lifeline   988 or 3-675-720-FLXH (4292)    Crisis Text Line    www.crisistextline.org  Text HOME to 674471 from anywhere in the [place], anytime, about any type of crisis. A live, trained crisis counselor will receive the text and respond quickly.    [place] Lifeline for LGBTQ Youth  A national crisis intervention and suicide lifeline for LGBTQ youth under 25. Provides a safe place to talk without judgement. Call 1-736.153.7556; text START to 524627 or visit www.thetrevorproject.org to talk to a trained counselor.    For [facility] crisis numbers, visit the [facility] website at:  https://[place].gov/dhs/people-we-serve/adults/health-care/mental-health/resources/crisis-contacts.jsp

## 2024-06-25 DIAGNOSIS — K21.9 GASTROESOPHAGEAL REFLUX DISEASE WITHOUT ESOPHAGITIS: Primary | ICD-10-CM

## 2024-06-26 RX ORDER — CALCIUM CARBONATE 500 MG/1
TABLET, CHEWABLE ORAL
Qty: 90 TABLET | Refills: 3 | Status: SHIPPED | OUTPATIENT
Start: 2024-06-26

## 2024-07-02 ENCOUNTER — TELEPHONE (OUTPATIENT)
Dept: FAMILY MEDICINE | Facility: CLINIC | Age: 89
End: 2024-07-02
Payer: MEDICARE

## 2024-07-02 NOTE — TELEPHONE ENCOUNTER
OLIVER,      ÓSCARI:      [facility] called.  Patient has been discharged from .      Thanks,  Hellen Kennedy RN

## 2024-07-08 DIAGNOSIS — D64.9 ANEMIA, UNSPECIFIED TYPE: ICD-10-CM

## 2024-07-08 DIAGNOSIS — D50.9 IRON DEFICIENCY ANEMIA, UNSPECIFIED IRON DEFICIENCY ANEMIA TYPE: ICD-10-CM

## 2024-07-09 RX ORDER — ASCORBIC ACID 250 MG
TABLET,CHEWABLE ORAL
Qty: 90 TABLET | Refills: 3 | Status: SHIPPED | OUTPATIENT
Start: 2024-07-09

## 2024-07-09 RX ORDER — FERROUS SULFATE 325(65) MG
TABLET, DELAYED RELEASE (ENTERIC COATED) ORAL
Qty: 90 TABLET | Refills: 3 | Status: SHIPPED | OUTPATIENT
Start: 2024-07-09

## 2024-07-15 LAB
ATRIAL RATE - MUSE: 79 BPM
DIASTOLIC BLOOD PRESSURE - MUSE: NORMAL MMHG
INTERPRETATION ECG - MUSE: NORMAL
P AXIS - MUSE: 45 DEGREES
PR INTERVAL - MUSE: 226 MS
QRS DURATION - MUSE: 72 MS
QT - MUSE: 354 MS
QTC - MUSE: 405 MS
R AXIS - MUSE: 24 DEGREES
SYSTOLIC BLOOD PRESSURE - MUSE: NORMAL MMHG
T AXIS - MUSE: 33 DEGREES
VENTRICULAR RATE- MUSE: 79 BPM

## 2024-07-22 NOTE — PROGRESS NOTES
Attempted to reach daughter, Meka at preferred phone number as she called earlier and this RN was unable to speak at the time. Per patient, okay to update daughter. Left message on Meka's cell phone. Awaiting call back.   Post-Care Instructions: I reviewed with the patient in detail post-care instructions. Patient is to wear sunprotection, and avoid picking at any of the treated lesions. Pt may apply Vaseline to crusted or scabbing areas. Duration Of Freeze Thaw-Cycle (Seconds): 10 Render Note In Bullet Format When Appropriate: No Consent: The patient's consent was obtained including but not limited to risks of crusting, scabbing, blistering, scarring, darker or lighter pigmentary change, recurrence, incomplete removal and infection. Show Aperture Variable?: Yes Detail Level: Detailed Number Of Freeze-Thaw Cycles: 1 freeze-thaw cycle

## 2024-07-25 ENCOUNTER — TELEPHONE (OUTPATIENT)
Dept: FAMILY MEDICINE | Facility: CLINIC | Age: 89
End: 2024-07-25
Payer: MEDICARE

## 2024-07-25 DIAGNOSIS — R29.6 FALLS FREQUENTLY: Primary | ICD-10-CM

## 2024-07-25 NOTE — TELEPHONE ENCOUNTER
Alison FELIPE, RN at [facility] calling to report a patient fall   Patient was using a 3 wheel walker in the dining room   /70    O2 98% at time of fall   BP recheck was 124/60 4 hours later  Patient reported 5/10 pain   Silver dollar sized bruising on bilateral knees where she fell   Slight R hand bruising as well  States patient did not hit her head at any point and is doing okay now  Inquiring if PCP recommends beginning PT/OT   Please call back: [phone] if recommended    Thanks,  Jazz PADILLA RN

## 2024-07-25 NOTE — TELEPHONE ENCOUNTER
If she and family would like to do PT for fall prevention I would be in agreement with that. Order placed, please fax to facility as needed.   Joel Wegener,MD

## 2024-07-26 NOTE — TELEPHONE ENCOUNTER
Called below number for requested call back. [phone]   No voicemail set up and unable to leave message.    Will call back again later    Kenneth RAGSDALE RN

## 2024-07-26 NOTE — TELEPHONE ENCOUNTER
FABIANO ([facility]),    Alison, RN at [facility] called back,    Referral was ordered as outpatient.  Patient is in a locked memory care unit.  Patient requires PT to come to patient.    Callback [phone]  Ok to leave detailed VM    Kenneth RAGSDALE RN

## 2024-08-05 ENCOUNTER — TELEPHONE (OUTPATIENT)
Dept: FAMILY MEDICINE | Facility: CLINIC | Age: 89
End: 2024-08-05
Payer: MEDICARE

## 2024-08-08 ENCOUNTER — MEDICAL CORRESPONDENCE (OUTPATIENT)
Dept: HEALTH INFORMATION MANAGEMENT | Facility: CLINIC | Age: 89
End: 2024-08-08

## 2024-08-09 ENCOUNTER — TELEPHONE (OUTPATIENT)
Dept: FAMILY MEDICINE | Facility: CLINIC | Age: 89
End: 2024-08-09
Payer: MEDICARE

## 2024-08-09 NOTE — TELEPHONE ENCOUNTER
Shane,   Rambo from [facility] called to request the following verbal orders from JW:    6 PT visits over the next 8 weeks.     Best call back #: [phone].   Confidential vm. Damián for detailed vm.    Thanks!  Cassidy SHEPHERD

## 2024-08-09 NOTE — TELEPHONE ENCOUNTER
JW,  Please see below request for physical therapy orders.        Home Care is calling regarding an established patient with [facility].      Requesting orders from: Wegener, Joel Daniel Irwin  Provider is following patient: Yes  Is this a 60-day recertification request?  No    Orders Requested    Physical Therapy  Request for initial certification (first set of orders)   Frequency:  see below    Lola Medina RN

## 2024-08-23 ENCOUNTER — TELEPHONE (OUTPATIENT)
Dept: FAMILY MEDICINE | Facility: CLINIC | Age: 89
End: 2024-08-23
Payer: MEDICARE

## 2024-08-23 NOTE — TELEPHONE ENCOUNTER
Forms/Letter Request    Type of form/letter: Home Health Certification and Plan Of Care 8/8/24--10/6/24  Order # [id number]    Do we have the form/letter: Yes:     Who is the form from? [facility]    Where did/will the form come from? form was faxed in    When is form/letter needed by: asap    How would you like the form/letter returned: Fax : [phone]

## 2024-08-27 DIAGNOSIS — Z53.9 DIAGNOSIS NOT YET DEFINED: Primary | ICD-10-CM

## 2024-08-27 PROCEDURE — G0180 MD CERTIFICATION HHA PATIENT: HCPCS | Performed by: FAMILY MEDICINE

## 2024-08-27 NOTE — TELEPHONE ENCOUNTER
Forms faxed to [facility] fax # [phone] and copy sent to stat scan.     Cassidy SHEPHERD

## 2024-09-04 ENCOUNTER — TELEPHONE (OUTPATIENT)
Dept: FAMILY MEDICINE | Facility: CLINIC | Age: 89
End: 2024-09-04
Payer: MEDICARE

## 2024-09-04 NOTE — TELEPHONE ENCOUNTER
[facility] calling to see if PCP could Addend the note that was done on 6/24/24.  Requesting PCP address the pt falls in the note also requesting to put in the diagnosis for the falls. Pt is unable to get an appointment until the end of October and [facility] needs it to be done within 30 days of seeing the pt. Thanks    Dulce Bridges RN  [facility]

## 2024-09-10 NOTE — PROGRESS NOTES
"HPI:    Patient was last seen by me on 5/13/24 for double vision.  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  Despite the poor visual acuity in the left eye 2* exudative AMD, she was symptomatic.  I recommended patching.    Today, Keysha reports her vision is \"good.\"  She denies double vision.  Her daughter reports that she patched the left eye for about 2 months.  Around July, the eye was noticeably straighter, and they removed the patch.  Keysha periodically closed her left eye, but in the last month or so has been seeing well with both eyes open.  Her daughter no longer sees her squinting or closing an eye.    Patient continues to monitor AMD left>right with Dr. Irizarry.  She is due for follow-up early next year.    Assessment and Plan:  Today, Keysha has had marked improvement in her large angle left exotropia post-CVA.  Her diplopia has resolved within the last 2 months without recurrence.  Today, she is orthophoric with Krimsky testing.  Her visual acuity is stable, and I advised her to continue care with Dr. Irizarry as directed.  I am happy to see her in the future with any concerns/changes; patient's daughter, Meka, has my contact information in the event of recurrent double vision.    Complete documentation of historical and exam elements from today's encounter can be found in the full encounter summary report (not reduplicated in this progress note). I personally obtained the chief complaint(s) and history of present illness. I confirmed and edited as necessary the review of systems, past medical/surgical history, family history, social history, and examination findings as document by others; and I examined the patient myself. I personally reviewed the relevant tests, images, and reports as documented above. I formulated and edited as necessary the assessment and plan and discussed the findings and management plan with the patient and family.    Meg Morelos, " OD

## 2024-09-17 ENCOUNTER — TELEPHONE (OUTPATIENT)
Dept: FAMILY MEDICINE | Facility: CLINIC | Age: 89
End: 2024-09-17

## 2024-09-17 ENCOUNTER — OFFICE VISIT (OUTPATIENT)
Dept: OPHTHALMOLOGY | Facility: CLINIC | Age: 89
End: 2024-09-17
Payer: MEDICARE

## 2024-09-17 DIAGNOSIS — H50.10 MONOCULAR EXOTROPIA: Primary | ICD-10-CM

## 2024-09-17 DIAGNOSIS — Z53.9 DIAGNOSIS NOT YET DEFINED: Primary | ICD-10-CM

## 2024-09-17 PROCEDURE — 99212 OFFICE O/P EST SF 10 MIN: CPT

## 2024-09-17 PROCEDURE — G0180 MD CERTIFICATION HHA PATIENT: HCPCS | Performed by: FAMILY MEDICINE

## 2024-09-17 PROCEDURE — G0463 HOSPITAL OUTPT CLINIC VISIT: HCPCS

## 2024-09-17 ASSESSMENT — REFRACTION_WEARINGRX
SPECS_TYPE: PAL
OS_SPHERE: PLANO
OD_ADD: +2.50
OS_ADD: +2.50
OD_AXIS: 180
OD_CYLINDER: +1.50
OS_AXIS: 145
OD_SPHERE: -1.00
OS_CYLINDER: +1.00

## 2024-09-17 ASSESSMENT — CONF VISUAL FIELD
OS_INFERIOR_NASAL_RESTRICTION: 0
OS_NORMAL: 1
OS_SUPERIOR_NASAL_RESTRICTION: 0
OD_INFERIOR_TEMPORAL_RESTRICTION: 0
OD_NORMAL: 1
OD_SUPERIOR_NASAL_RESTRICTION: 0
OS_SUPERIOR_TEMPORAL_RESTRICTION: 0
OD_INFERIOR_NASAL_RESTRICTION: 0
OD_SUPERIOR_TEMPORAL_RESTRICTION: 0
OS_INFERIOR_TEMPORAL_RESTRICTION: 0
METHOD: COUNTING FINGERS

## 2024-09-17 ASSESSMENT — VISUAL ACUITY
METHOD: SNELLEN - LINEAR
OD_PH_CC+: -2
OS_CC: 20/500
CORRECTION_TYPE: GLASSES
OD_CC: 20/50
OD_PH_CC: 20/40

## 2024-09-17 ASSESSMENT — TONOMETRY
OD_IOP_MMHG: 16
OS_IOP_MMHG: 17
IOP_METHOD: ICARE

## 2024-09-17 ASSESSMENT — EXTERNAL EXAM - RIGHT EYE: OD_EXAM: NORMAL

## 2024-09-17 ASSESSMENT — SLIT LAMP EXAM - LIDS: COMMENTS: DERMATOCHALASIS

## 2024-09-17 ASSESSMENT — EXTERNAL EXAM - LEFT EYE: OS_EXAM: NORMAL

## 2024-09-17 NOTE — NURSING NOTE
"Chief Complaint(s) and History of Present Illness(es)       Diplopia Follow-Up    Since onset it is gradually improving.  Associated symptoms include Negative for eye pain and blurred vision. Additional comments: 4 month follow-up for double vision.  Keysha reports vision is \"just fine.\"  She says she is not experiencing any double vision.  No eye pain or headaches.   ROBERTO Bush 9/17/2024 12:47 PM                       "

## 2024-09-17 NOTE — TELEPHONE ENCOUNTER
Forms/Letter Request    Type of form/letter: Home Health Certification and Plan of Care 8/8/24--10/6/24    Do we have the form or letter: Yes:     Who is the form from? [facility]    Where did/will the form come from? form was faxed in    When is form/letter needed by: asap    How would you like the form/letter returned: Fax : [phone]    Placed with QUINTON Kovacs in JW'S Absence

## 2024-09-18 ENCOUNTER — TELEPHONE (OUTPATIENT)
Dept: FAMILY MEDICINE | Facility: CLINIC | Age: 89
End: 2024-09-18
Payer: MEDICARE

## 2024-09-18 NOTE — TELEPHONE ENCOUNTER
Forms/Letter Request    Type of form/letter: Home Health Certification and Plan of care 8/8/24--10/6/24        Do we have the form/letter: Yes:    Who is the form from? [facility]    Where did/will the form come from? form was faxed in    When is form/letter needed by: asap    How would you like the form/letter returned: Fax : [phone]

## 2024-10-01 NOTE — TELEPHONE ENCOUNTER
Forms signed, faxed and sent for scanning  Radha [facility] Unit Coordinator

## 2024-10-24 DIAGNOSIS — H60.502 ACUTE OTITIS EXTERNA OF LEFT EAR, UNSPECIFIED TYPE: Primary | ICD-10-CM

## 2024-10-25 ENCOUNTER — TELEPHONE (OUTPATIENT)
Dept: FAMILY MEDICINE | Facility: CLINIC | Age: 89
End: 2024-10-25
Payer: MEDICARE

## 2024-10-25 RX ORDER — NEOMYCIN SULFATE, POLYMYXIN B SULFATE AND HYDROCORTISONE 10; 3.5; 1 MG/ML; MG/ML; [USP'U]/ML
SUSPENSION/ DROPS AURICULAR (OTIC)
Qty: 10 ML | Refills: 97 | Status: SHIPPED | OUTPATIENT
Start: 2024-10-25

## 2024-10-25 NOTE — TELEPHONE ENCOUNTER
Nurse from [facility] called stating pt fell today had No injury got up too quickly and fell to her knees.   It was a witnessed fall. She got up right away no busing  noted, not head involvement and no open areas.   Thanks.   Josue Dominguez RN  [facility]

## 2024-10-28 NOTE — TELEPHONE ENCOUNTER
Please let them know I appreciate the update, no further action needed at this time. Joel Wegener,MD w

## 2024-10-29 ENCOUNTER — MEDICAL CORRESPONDENCE (OUTPATIENT)
Dept: HEALTH INFORMATION MANAGEMENT | Facility: CLINIC | Age: 89
End: 2024-10-29
Payer: MEDICARE

## 2024-10-29 ENCOUNTER — TELEPHONE (OUTPATIENT)
Dept: FAMILY MEDICINE | Facility: CLINIC | Age: 89
End: 2024-10-29
Payer: MEDICARE

## 2024-10-29 NOTE — TELEPHONE ENCOUNTER
Forms/Letter Request    Type of form/letter:   Orders: 10/29/24    Physician order sheet  Printed: Tuesday, October 29,2024    Medications    Do we have the form/letter: Yes    Who is the form from?   [facility]    Where did/will the form come from? form was faxed in    When is form/letter needed by: n/a    How would you like the form/letter returned:   Fax : [phone]    Patient Notified form requests are processed in 5-7 business days:No    Could we send this information to you in [place] or would you prefer to receive a phone call?:   n/a    Placed on Dr. Wegener's desk.

## 2024-10-30 NOTE — TELEPHONE ENCOUNTER
Forms faxed to [facility] fax # [phone] and copy sent to stat scan.     Cassidy SHEPHERD

## 2024-11-07 ENCOUNTER — TELEPHONE (OUTPATIENT)
Dept: FAMILY MEDICINE | Facility: CLINIC | Age: 89
End: 2024-11-07
Payer: MEDICARE

## 2024-11-07 NOTE — TELEPHONE ENCOUNTER
Forms/Letter Request    Type of form/letter: OTHER: physician orders  Ferrous sulfate 325 mg --> take 1 tab by mouth daily. Take w/ vitamin C each day.   Vitamin C 250 mg --> take 1 chew 250 mg by mouth daily       Do we have the form/letter: Yes: in OLIVER's office    Who is the form from? [place] (if other please explain)    Where did/will the form come from? form was faxed in    When is form/letter needed by: asap    How would you like the form/letter returned: Fax : [phone]

## 2024-11-08 ENCOUNTER — MEDICAL CORRESPONDENCE (OUTPATIENT)
Dept: HEALTH INFORMATION MANAGEMENT | Facility: CLINIC | Age: 89
End: 2024-11-08
Payer: MEDICARE

## 2024-12-12 ENCOUNTER — TELEPHONE (OUTPATIENT)
Dept: FAMILY MEDICINE | Facility: CLINIC | Age: 89
End: 2024-12-12
Payer: MEDICARE

## 2024-12-12 NOTE — TELEPHONE ENCOUNTER
"Patient daughter calling with concerns of cognitive decline/delusions  Patient not currently with daughter-unable to triage  Patient currently resides in memory care but is overall still \"with it\" per daughter  In the last week daughter and care facility staff have noted an increase in delusions  Daughter concerned it may be UTI related  No other symptoms present ie. Pain, urinary changes  Daughter reports there is a doctor who comes to the care facility  This concern has not been reported to that doctor yet  Daughter wanting to schedule patient for visit  Nurse assisted with scheduling  Scheduled patient for OV on 12/17  Encouraged daughter to discuss concerns with facility provider as they may be able to address sooner   Advised to call back or go to UC or ED in the mean time if new or worsening symptoms  Daughter verbalized understanding and agrees with plan of care.   Will call back with new or worsening symptoms    Phylicia PARISI RN    "

## 2024-12-22 ASSESSMENT — ANXIETY QUESTIONNAIRES: GAD7 TOTAL SCORE: INCOMPLETE

## 2024-12-26 ENCOUNTER — OFFICE VISIT (OUTPATIENT)
Dept: FAMILY MEDICINE | Facility: CLINIC | Age: 89
End: 2024-12-26
Payer: MEDICARE

## 2024-12-26 VITALS
SYSTOLIC BLOOD PRESSURE: 137 MMHG | HEART RATE: 77 BPM | RESPIRATION RATE: 16 BRPM | OXYGEN SATURATION: 97 % | WEIGHT: 111.6 LBS | TEMPERATURE: 97.5 F | DIASTOLIC BLOOD PRESSURE: 67 MMHG | BODY MASS INDEX: 21.8 KG/M2

## 2024-12-26 DIAGNOSIS — F02.80 ALZHEIMER'S DISEASE (H): Primary | ICD-10-CM

## 2024-12-26 DIAGNOSIS — F32.1 CURRENT MODERATE EPISODE OF MAJOR DEPRESSIVE DISORDER WITHOUT PRIOR EPISODE (H): ICD-10-CM

## 2024-12-26 DIAGNOSIS — G30.9 ALZHEIMER'S DISEASE (H): Primary | ICD-10-CM

## 2024-12-26 PROCEDURE — G2211 COMPLEX E/M VISIT ADD ON: HCPCS | Performed by: FAMILY MEDICINE

## 2024-12-26 PROCEDURE — 99214 OFFICE O/P EST MOD 30 MIN: CPT | Performed by: FAMILY MEDICINE

## 2024-12-26 RX ORDER — DONEPEZIL HYDROCHLORIDE 5 MG/1
5 TABLET, FILM COATED ORAL AT BEDTIME
Qty: 30 TABLET | Refills: 1 | Status: SHIPPED | OUTPATIENT
Start: 2024-12-26

## 2024-12-26 RX ORDER — SERTRALINE HYDROCHLORIDE 100 MG/1
100 TABLET, FILM COATED ORAL DAILY
Qty: 90 TABLET | Refills: 3 | Status: SHIPPED | OUTPATIENT
Start: 2024-12-26

## 2024-12-26 ASSESSMENT — PATIENT HEALTH QUESTIONNAIRE - PHQ9
SUM OF ALL RESPONSES TO PHQ QUESTIONS 1-9: 0
SUM OF ALL RESPONSES TO PHQ QUESTIONS 1-9: 0

## 2024-12-26 ASSESSMENT — ENCOUNTER SYMPTOMS: NERVOUS/ANXIOUS: 1

## 2024-12-26 ASSESSMENT — PAIN SCALES - GENERAL: PAINLEVEL_OUTOF10: NO PAIN (0)

## 2024-12-26 NOTE — PATIENT INSTRUCTIONS
ASSESSMENT AND PLAN  1. Current moderate episode of major depressive disorder without prior episode (H)  Increase to full dose to get most effectiveness out of it.   - sertraline (ZOLOFT) 100 MG tablet; Take 1 tablet (100 mg) by mouth daily.  Dispense: 90 tablet; Refill: 3    2. Alzheimer's disease (H) (Primary)  Trial aricept 5 mg/day.  May cause loose stool initially.     Follow up Jan 28th 10:40 arrival time.   - donepezil (ARICEPT) 5 MG tablet; Take 1 tablet (5 mg) by mouth at bedtime.  Dispense: 30 tablet; Refill: 1

## 2024-12-26 NOTE — PROGRESS NOTES
Assessment & Plan   Here with daughter.  Living in memory care.  Main concern is decreased mood, crying often.  Difficultyu connecting with other residents since her memory actually somewhat better.  Sees family often (daughter daily, [employer] weekly, plays bridge, etc). Not suicidal.     Alzheimer's disease (H)  Also worsening short term memory, names, events from day prior, etc.  Trial aricept, if no side effects can increase to 10mg in 2-4 weeks (loose stool, stomach upset.   - donepezil (ARICEPT) 5 MG tablet; Take 1 tablet (5 mg) by mouth at bedtime.    Current moderate episode of major depressive disorder without prior episode (H)      1/17/2024     3:12 PM 6/19/2024    12:20 PM 12/26/2024     3:11 PM   PHQ   PHQ-9 Total Score 0 5 0    Q9: Thoughts of better off dead/self-harm past 2 weeks Not at all Not at all Not at all       Patient-reported     Increase to full dose sertraline.  Has not been having side effects.   - sertraline (ZOLOFT) 100 MG tablet; Take 1 tablet (100 mg) by mouth daily.    35 minutes spent on the date of the encounter doing chart review, history and exam, negotiating and explaining the plan with the patient, documentation and further activities as noted above.        The longitudinal plan of care for the diagnosis(es)/condition(s) as documented were addressed during this visit. Due to the added complexity in care, I will continue to support Keysha in the subsequent management and with ongoing continuity of care.            Melody Chopra is a 89 year old, presenting for the following health issues:  Depression and Anxiety      12/26/2024     3:22 PM   Additional Questions   Roomed by Carola Belle    History of Present Illness       Mental Health Follow-up:  Patient presents to follow-up on Depression & Anxiety.Patient's depression since last visit has been:  Worse  The patient is not having other symptoms associated with depression.  Patient's anxiety since last visit has  been:  Worse  The patient is having other symptoms associated with anxiety.  Any significant life events: grief or loss  Patient is feeling anxious or having panic attacks.  Patient has no concerns about alcohol or drug use.   She is taking medications regularly.                     Objective    There were no vitals taken for this visit.  There is no height or weight on file to calculate BMI.  Physical Exam   Full affect todayl.  Interactive, smiles.             Signed Electronically by: Joel Daniel Wegener, MD

## 2024-12-30 ENCOUNTER — NURSE TRIAGE (OUTPATIENT)
Dept: FAMILY MEDICINE | Facility: CLINIC | Age: 89
End: 2024-12-30
Payer: MEDICARE

## 2024-12-30 NOTE — TELEPHONE ENCOUNTER
Patient's daughter ANDREI cedillo on file  Reports patient tested positive for Covid today  Currently out of the state while staying in [place] - unable to officially triage  Patient experiencing nasal congestion, cough, fatigue  Symptoms began last evening  Denies chest pain, difficulty breathing  Advised to go to urgent care in their area to review Paxlovid prescribing and any other treatment recommendations  Daughter verbalized understanding and agreed with plan of care  Jazz Kaplan RN    Reason for Disposition   HIGH RISK patient (e.g., weak immune system, age > 64 years, obesity with BMI of 30 or higher, pregnant, chronic lung disease) and COVID symptoms (e.g., cough, fever) (Exceptions: Already seen by doctor or NP/PA and no new or worsening symptoms.)    Additional Information   Negative: SEVERE difficulty breathing (e.g., struggling for each breath, speaks in single words)   Negative: Difficult to awaken or acting confused (e.g., disoriented, slurred speech)   Negative: Bluish (or gray) lips or face now   Negative: Shock suspected (e.g., cold/pale/clammy skin, too weak to stand, low BP, rapid pulse)   Negative: Sounds like a life-threatening emergency to the triager   Negative: Diagnosed or suspected COVID-19 and symptoms lasting 3 or more weeks   Negative: COVID-19 exposure and no symptoms   Negative: COVID-19 vaccine reaction suspected (e.g., fever, headache, muscle aches) occurring 1 to 3 days after getting vaccine   Negative: COVID-19 vaccine, questions about   Negative: Exposure to someone known to have influenza (flu test positive) and flu-like symptoms (e.g., cough, runny nose, sore throat, SOB; with or without fever)   Negative: Possible COVID-19 symptoms and triager concerned about severity of symptoms or other causes   Negative: COVID-19 and breastfeeding, questions about   Negative: SEVERE or constant chest pain or pressure  (Exception: Mild central chest pain, present only when  coughing.)   Negative: MODERATE difficulty breathing (e.g., speaks in phrases, SOB even at rest, pulse 100-120)   Negative: Headache and stiff neck (can't touch chin to chest)   Negative: Oxygen level (e.g., pulse oximetry) 90% or lower   Negative: Chest pain or pressure  (Exception: MILD central chest pain, present only when coughing.)   Negative: Drinking very little and dehydration suspected (e.g., no urine > 12 hours, very dry mouth, very lightheaded)   Negative: Patient sounds very sick or weak to the triager   Negative: Fever > 100 F (37.8 C) and bedridden (e.g., CVA, chronic illness, recovering from surgery)   Negative: Fever > 103 F (39.4 C)   Negative: Fever > 101 F (38.3 C) and over 60 years of age   Negative: MILD difficulty breathing (e.g., minimal/no SOB at rest, SOB with walking, pulse <100)    Protocols used: COVID-19 - Diagnosed or [id number]-FN

## 2025-01-13 ENCOUNTER — TELEPHONE (OUTPATIENT)
Dept: FAMILY MEDICINE | Facility: CLINIC | Age: OVER 89
End: 2025-01-13
Payer: MEDICARE

## 2025-01-13 DIAGNOSIS — R05.1 ACUTE COUGH: Primary | ICD-10-CM

## 2025-01-13 RX ORDER — BENZONATATE 100 MG/1
100 CAPSULE ORAL 3 TIMES DAILY PRN
Qty: 90 CAPSULE | Refills: 0 | Status: SHIPPED | OUTPATIENT
Start: 2025-01-13

## 2025-01-13 NOTE — TELEPHONE ENCOUNTER
HALLEY Salazar from [facility] calling:    -One of their staff noticed bottle of benzonatate for cough in pt's room.   -They explained to the family that they cannot have medication in the patient's room, because they administer all of her medication while she resides in memory care.   -RN reports the medication had been prescribed PRN for cough, and had been prescribed and picked up during a recent trip to [place] in December.     -Pt is not presently having a cough, per RN.   -Marie notes the patient's daughter may want Keysha to have a PRN prescription ordered by you so she can still have this medication on hand, to replace the bottle that the RN took away.   -If desired, the replacement benzonatate Rx can be sent to their pharmacy is [facility] term [facility].     Callback# [phone]

## 2025-01-16 DIAGNOSIS — G30.9 ALZHEIMER'S DISEASE (H): ICD-10-CM

## 2025-01-16 DIAGNOSIS — F02.80 ALZHEIMER'S DISEASE (H): ICD-10-CM

## 2025-01-16 RX ORDER — DONEPEZIL HYDROCHLORIDE 5 MG/1
5 TABLET, FILM COATED ORAL AT BEDTIME
Qty: 90 TABLET | Refills: 97 | OUTPATIENT
Start: 2025-01-16

## 2025-01-18 DIAGNOSIS — F02.80 ALZHEIMER'S DISEASE (H): ICD-10-CM

## 2025-01-18 DIAGNOSIS — G30.9 ALZHEIMER'S DISEASE (H): ICD-10-CM

## 2025-01-20 RX ORDER — DONEPEZIL HYDROCHLORIDE 5 MG/1
5 TABLET, FILM COATED ORAL AT BEDTIME
Qty: 90 TABLET | Refills: 97 | OUTPATIENT
Start: 2025-01-20

## 2025-01-28 ENCOUNTER — OFFICE VISIT (OUTPATIENT)
Dept: FAMILY MEDICINE | Facility: CLINIC | Age: OVER 89
End: 2025-01-28
Payer: MEDICARE

## 2025-01-28 VITALS
OXYGEN SATURATION: 98 % | WEIGHT: 108.3 LBS | SYSTOLIC BLOOD PRESSURE: 125 MMHG | TEMPERATURE: 97.3 F | HEART RATE: 78 BPM | BODY MASS INDEX: 21.15 KG/M2 | DIASTOLIC BLOOD PRESSURE: 78 MMHG | RESPIRATION RATE: 16 BRPM

## 2025-01-28 DIAGNOSIS — G30.9 ALZHEIMER'S DISEASE (H): Primary | ICD-10-CM

## 2025-01-28 DIAGNOSIS — F02.80 ALZHEIMER'S DISEASE (H): Primary | ICD-10-CM

## 2025-01-28 DIAGNOSIS — H35.3233 EXUDATIVE AGE-RELATED MACULAR DEGENERATION OF BOTH EYES WITH INACTIVE SCAR (H): ICD-10-CM

## 2025-01-28 DIAGNOSIS — F32.1 CURRENT MODERATE EPISODE OF MAJOR DEPRESSIVE DISORDER WITHOUT PRIOR EPISODE (H): ICD-10-CM

## 2025-01-28 DIAGNOSIS — L30.9 ECZEMA, UNSPECIFIED TYPE: ICD-10-CM

## 2025-01-28 PROCEDURE — G2211 COMPLEX E/M VISIT ADD ON: HCPCS | Performed by: FAMILY MEDICINE

## 2025-01-28 PROCEDURE — 99213 OFFICE O/P EST LOW 20 MIN: CPT | Performed by: FAMILY MEDICINE

## 2025-01-28 RX ORDER — DONEPEZIL HYDROCHLORIDE 5 MG/1
5 TABLET, FILM COATED ORAL AT BEDTIME
Qty: 30 TABLET | Refills: 1 | Status: CANCELLED | OUTPATIENT
Start: 2025-01-28

## 2025-01-28 RX ORDER — DONEPEZIL HYDROCHLORIDE 10 MG/1
10 TABLET, FILM COATED ORAL AT BEDTIME
Qty: 90 TABLET | Refills: 1 | Status: SHIPPED | OUTPATIENT
Start: 2025-01-28

## 2025-01-28 ASSESSMENT — PAIN SCALES - GENERAL: PAINLEVEL_OUTOF10: NO PAIN (0)

## 2025-01-28 NOTE — PATIENT INSTRUCTIONS
depresssion/alzheimer's:  increase aricept to 10mg    Eczema:  recommend over the counter hydrocortisone ointment.     Follow up early april

## 2025-01-28 NOTE — PROGRESS NOTES
Assessment & Plan   Visit with Keysha and daughter john today.   Alzheimer's disease (H)  No side effects so far and her and daughter feel moderate improvement.  Ok to increase to 10mg/day.     Follow up about three months planned.   - donepezil (ARICEPT) 10 MG tablet; Take 1 tablet (10 mg) by mouth at bedtime.    Eczema, unspecified type  Right ear dry patch on auricle.  Recommend using over the counter hydrocortisone ointment twice daily for 1-2 weeks.     Current moderate episode of major depressive disorder without prior episode (H)      1/17/2024     3:12 PM 6/19/2024    12:20 PM 12/26/2024     3:11 PM   PHQ   PHQ-9 Total Score 0 5 0    Q9: Thoughts of better off dead/self-harm past 2 weeks Not at all Not at all Not at all       Patient-reported     Also her and daughter feel fairly significant improvement with sertraline . Very few crying episodes.  Appears looking forward to being social more often.     Exudative age-related macular degeneration of both eyes with inactive scar (H)  Last optho visit 09/24.  Not thought to be contributing to disorientation//confusion at this time.       35 minutes spent on the date of the encounter doing chart review, history and exam, negotiating and explaining the plan with the patient, documentation and further activities as noted above.      The longitudinal plan of care for the diagnosis(es)/condition(s) as documented were addressed during this visit. Due to the added complexity in care, I will continue to support Keysha in the subsequent management and with ongoing continuity of care.            Melody Chopra is a 89 year old, presenting for the following health issues:  Depression        1/28/2025    10:54 AM   Additional Questions   Roomed by Carola Scruggs by Alin      History of Present Illness       Mental Health Follow-up:  Patient presents to follow-up on Depression.Patient's depression since last visit has been:  Better  The patient is not  having other symptoms associated with depression.      Any significant life events: No  Patient is not feeling anxious or having panic attacks.  Patient has no concerns about alcohol or drug use.    She eats 4 or more servings of fruits and vegetables daily.She consumes 2 sweetened beverage(s) daily.She exercises with enough effort to increase her heart rate 9 or less minutes per day.  She exercises with enough effort to increase her heart rate 3 or less days per week.   She is taking medications regularly.                     Objective    /78   Pulse 78   Temp 97.3  F (36.3  C) (Temporal)   Resp 16   Wt 49.1 kg (108 lb 4.8 oz)   SpO2 98%   BMI 21.15 kg/m    Body mass index is 21.15 kg/m .  Physical Exam   Appears well.  Clear speech.  Answers questions appropriately today.             Signed Electronically by: Joel Daniel Wegener, MD

## 2025-02-12 ENCOUNTER — TELEPHONE (OUTPATIENT)
Dept: FAMILY MEDICINE | Facility: CLINIC | Age: OVER 89
End: 2025-02-12
Payer: MEDICARE

## 2025-02-12 NOTE — TELEPHONE ENCOUNTER
Received forms from [facility].   Physician order sheet dated 2/7/25.   Faxed to [facility] and copy sent to [facility].     Cassidy SHEPHERD

## 2025-02-25 ENCOUNTER — TELEPHONE (OUTPATIENT)
Dept: FAMILY MEDICINE | Facility: CLINIC | Age: OVER 89
End: 2025-02-25
Payer: MEDICARE

## 2025-02-25 NOTE — TELEPHONE ENCOUNTER
Forms/Letter Request    Type of form/letter: OTHER: please clarify sertline dose 50mg daily or 100mg daily we have 100mg daily       Do we have the form/letter: Yes: order    Who is the form from? Home care    Where did/will the form come from? form was faxed in    When is form/letter needed by:  asap fwd order to wegener    How would you like the form/letter returned: Fax : [phone]    Patient Notified form requests are processed in 5-7 business days:N/A    Could we send this information to you in [employer] or would you prefer to receive a phone call?:   No preference   Okay to leave a detailed message?: Yes at Other phone number:  [phone]

## 2025-02-26 ENCOUNTER — TELEPHONE (OUTPATIENT)
Dept: FAMILY MEDICINE | Facility: CLINIC | Age: OVER 89
End: 2025-02-26
Payer: MEDICARE

## 2025-02-26 NOTE — TELEPHONE ENCOUNTER
"RN from pt's [facility] calling to \"double check the Sertraline dose\".   Informed RN the medication was ordered by PCP as below:     sertraline (ZOLOFT) 100 MG tablet Take 1 tablet (100 mg) by mouth daily. 90 tablet 3 ordered 12/26/2024 -- -- -- --          Summary: Take 1 tablet (100 mg) by mouth daily., Disp-90 tablet, R-3, E-Prescribe        RN verbalized understanding of the provided information.     Janel LACEY RN    "

## 2025-03-15 ENCOUNTER — TELEPHONE (OUTPATIENT)
Dept: FAMILY MEDICINE | Facility: CLINIC | Age: OVER 89
End: 2025-03-15
Payer: MEDICARE

## 2025-03-15 ENCOUNTER — TELEPHONE (OUTPATIENT)
Dept: NURSING | Facility: CLINIC | Age: OVER 89
End: 2025-03-15
Payer: MEDICARE

## 2025-03-15 NOTE — TELEPHONE ENCOUNTER
Nurse Triage SBAR    Is this a 2nd Level Triage? NO    Situation: HALLEY Salazar calling to report that she is looking for an order for Zofran to treat nausea due to possible Norovirus     Background: Pt lives in [facility] in their Memory Care unit and they have Norovirus going around their facility and patient has started to have nausea and vomiting. Triage Nurse was called last night at their facility to report theses symptoms internally to their own triage nurse    Recommendation: Per [facility] protocol, caller was transferred to patient's clinic answering service to page the on call provider to discuss getting these orders.      Mary Sinha RN   Triage Nurse Advisor on 3/15/2025 at 8:48 AM

## 2025-03-15 NOTE — TELEPHONE ENCOUNTER
Called connected by cesar answering this am to patients senior housing. As I am on call today  Patient unknown to this provider  Nurse there reported patent had exposure to norovirus occurring in the facility   They had called [facility] for start of n/ vomiting who told them to call her clinic provider directly & was connected to on call provider.   Given verbal order at time of call to nurse for this patient to get zofran order 4 mg every 8 hrs prn for 2 days to dispense max only 6 tabs no refills.  Go to er if worse

## 2025-03-17 ENCOUNTER — TELEPHONE (OUTPATIENT)
Dept: FAMILY MEDICINE | Facility: CLINIC | Age: OVER 89
End: 2025-03-17
Payer: MEDICARE

## 2025-03-17 NOTE — TELEPHONE ENCOUNTER
Forms/Letter Request    Type of form/letter:   V.O Zofran   4mg PO Give 1 tablet every 8 hr PRN dx nausea     Do we have the form/letter: Yes    Who is the form from?   [facility]    Where did/will the form come from? form was faxed in    When is form/letter needed by: n/a    How would you like the form/letter returned:   Fax: [phone]    Patient Notified form requests are processed in 5-7 business days:No    Could we send this information to you in [employer] or would you prefer to receive a phone call?:   n/a    Placed on Dr. Wegener's desk.

## 2025-03-18 ENCOUNTER — TELEPHONE (OUTPATIENT)
Dept: FAMILY MEDICINE | Facility: CLINIC | Age: OVER 89
End: 2025-03-18
Payer: MEDICARE

## 2025-03-18 ENCOUNTER — MEDICAL CORRESPONDENCE (OUTPATIENT)
Dept: HEALTH INFORMATION MANAGEMENT | Facility: CLINIC | Age: OVER 89
End: 2025-03-18
Payer: MEDICARE

## 2025-03-18 NOTE — TELEPHONE ENCOUNTER
Order/Referral Request    Who is requesting: Thai    Orders being requested: Physician orders-  Concerns- Nausea/vomiting    Reason service is needed/diagnosis: Order for Zofran 4 mg PO give 1 tablet every 8 hours PRN Dx: Nausea  OK PER DR. FARRIS    When are orders needed by:     Has this been discussed with Provider: No    Does patient have a preference on a Group/Provider/Facility?     Does patient have an appointment scheduled?: No    Where to send orders: Fax [phone]    Could we send this information to you in [facility] or would you prefer to receive a phone call?:   [phone] Thai

## 2025-04-05 ENCOUNTER — HEALTH MAINTENANCE LETTER (OUTPATIENT)
Age: OVER 89
End: 2025-04-05

## 2025-04-09 ENCOUNTER — TELEPHONE (OUTPATIENT)
Dept: FAMILY MEDICINE | Facility: CLINIC | Age: OVER 89
End: 2025-04-09
Payer: MEDICARE

## 2025-04-09 NOTE — TELEPHONE ENCOUNTER
Forms/Letter Request    Type of form/letter:     Medication Profile - [facility]    Active Orders as of 4/8/2025    Do we have the form/letter: Yes    Who is the form from?   [facility]    Where did/will the form come from? form was faxed in    When is form/letter needed by: n/a    How would you like the form/letter returned:   Fax: [phone]    Patient Notified form requests are processed in 5-7 business days:No    Could we send this information to you in [employer] or would you prefer to receive a phone call?:   n/a    Placed in [employer] RN bin.

## 2025-04-13 ENCOUNTER — MEDICAL CORRESPONDENCE (OUTPATIENT)
Dept: HEALTH INFORMATION MANAGEMENT | Facility: CLINIC | Age: OVER 89
End: 2025-04-13
Payer: MEDICARE

## 2025-04-15 ENCOUNTER — NURSE TRIAGE (OUTPATIENT)
Dept: FAMILY MEDICINE | Facility: CLINIC | Age: OVER 89
End: 2025-04-15
Payer: MEDICARE

## 2025-04-15 NOTE — TELEPHONE ENCOUNTER
Meka cedillo [facility] on file  Not currently with patient, but was with her approximately 2 hours ago  Reports mother fell last week and has wound on leg   L knee to ankle is blackish purple, patient walked into a chair and lost her balance  Localized swelling to the area  Denies reported pain  States patient was walking well and in good spirits today - had hair appointment  Patient saw podiatrist today as well  RN at assisted living facility called daughter tonight to report wound is hot to the touch, patient winced when RN touched the area   Advised OV to assess - scheduled first available appointment tomorrow   Will call back if any new or worsening symptoms in the mean time  Patient also being monitored by nursing home RN staff   Thanks,  Jazz PADILLA RN    Reason for Disposition   Localized pain, redness or hard lump along vein   Minor puncture wound    Additional Information   Negative: Looks like a broken bone or dislocated joint (e.g., crooked or deformed)   Negative: Sounds like a life-threatening emergency to the triager   Negative: Followed a hip injury   Negative: Followed a knee injury   Negative: Followed an ankle injury   Negative: Back pain radiating (shooting) into leg(s)   Negative: Foot pain is main symptom   Negative: Ankle pain is main symptom   Negative: Knee pain is main symptom   Negative: Leg swelling is main symptom   Negative: Chest pain   Negative: Difficulty breathing   Negative: Entire foot is cool or blue in comparison to other side   Negative: Unable to walk   Negative: Painful rash with multiple small blisters grouped together (i.e., dermatomal distribution or 'band' or 'stripe')   Negative: Looks like a boil, infected sore, deep ulcer, or other infected rash (spreading redness, pus)   Negative: Localized rash is very painful (no fever)   Negative: Numbness in a leg or foot (i.e., loss of sensation)   Negative: Fever and red area (or area very tender to touch)   Negative: Swollen joint  and fever   Negative: Thigh or calf pain in only one leg and present > 1 hour   Negative: Thigh, calf, or ankle swelling in only one leg   Negative: Thigh, calf, or ankle swelling in both legs, but one side is definitely more swollen   Negative: History of prior 'blood clot' in leg or lungs (i.e., deep vein thrombosis, pulmonary embolism)   Negative: History of inherited increased risk of blood clots (e.g., factor 5 Leiden, antithrombin 3, protein C or protein S deficiency, prothrombin mutation)   Negative: Major surgery in past month   Negative: Hip or leg fracture (broken bone) in past month (or had cast on leg or ankle in past month)   Negative: SEVERE pain (e.g., excruciating, unable to do any normal activities)   Negative: Cast on leg or ankle and now has increasing pain   Negative: Red area or streak > 2 inches (or 5 cm)   Negative: Illness requiring prolonged bedrest in past month (e.g., immobilization, long hospital stay)   Negative: Long-distance travel in past month (e.g., car, bus, train, plane; with trip lasting 6 or more hours)   Negative: Cancer treatment in past six months (or has cancer now)   Negative: Patient sounds very sick or weak to the triager   Negative: Shock suspected (e.g., cold/pale/clammy skin, too weak to stand, low BP, rapid pulse)   Negative: Puncture wound of head, neck, chest, back, or abdomen that sounds life-threatening to triager   Negative: Sounds like a life-threatening emergency to the triager   Negative: Puncture wound of head, neck, chest, abdomen, or overlying a joint and it could be deep   Negative: Needlestick from used or discarded injection needle  (Exception: Clean, unused needle.)   Negative: High pressure injection injury (e.g., from grease gun or paint gun, usually work-related)   Negative: Caused by an animal bite   Negative: Skin is cut or scraped, not punctured   Negative: Puncture wound of eye or eyelid   Negative: Foreign body is still in the skin (e.g.,  splinter, sliver, fishhook)   Negative: Dirt in the wound and not removed with 15 minutes of scrubbing   Negative: Sounds like a serious injury to the triager   Negative: SEVERE pain (e.g., excruciating)   Negative: Puncture wound of foot and hurts too much to walk on (i.e., unable to bear weight, severe limp)   Negative: Puncture wound of bare foot (no shoes) and setting was dirty   Negative: Puncture wound of foot and puncture went through shoe (e.g., tennis shoe)   Negative: Puncture wound of finger and entire finger swollen   Negative: Puncture wound from sharp object that was very dirty (e.g., barnyard)   Negative: Tip of the object is broken off and missing   Negative: Sensation of something still in the wound   Negative: Looks infected (e.g., spreading redness, red streak, pus)   Negative: Pain or swelling present > 5 days   Negative: No prior tetanus shots (or is not fully vaccinated) and any wound (e.g., cut or scrape)   Negative: HIV positive or severe immunodeficiency (severely weak immune system) and DIRTY puncture (e.g., object OR skin was dirty, objects on ground/floor)   Negative: Last tetanus shot > 5 years ago   Negative: Patient wants to be seen   Negative: After 14 days and wound isn't healed   Negative: Puncture wound on foot and patient has diabetes mellitus    Protocols used: Leg Pain-A-OH, Puncture Wound-A-OH

## 2025-04-16 ENCOUNTER — VIRTUAL VISIT (OUTPATIENT)
Dept: FAMILY MEDICINE | Facility: CLINIC | Age: OVER 89
End: 2025-04-16
Payer: MEDICARE

## 2025-04-16 ENCOUNTER — TELEPHONE (OUTPATIENT)
Dept: FAMILY MEDICINE | Facility: CLINIC | Age: OVER 89
End: 2025-04-16

## 2025-04-16 DIAGNOSIS — L03.116 CELLULITIS OF LEFT LOWER EXTREMITY: ICD-10-CM

## 2025-04-16 DIAGNOSIS — L03.116 CELLULITIS OF LEFT LOWER EXTREMITY: Primary | ICD-10-CM

## 2025-04-16 DIAGNOSIS — S80.12XA CONTUSION OF LEFT LOWER LEG, INITIAL ENCOUNTER: ICD-10-CM

## 2025-04-16 DIAGNOSIS — T14.8XXA SKIN WOUND CLOSED WITH STERILE STRIP: ICD-10-CM

## 2025-04-16 PROCEDURE — 98006 SYNCH AUDIO-VIDEO EST MOD 30: CPT | Performed by: FAMILY MEDICINE

## 2025-04-16 RX ORDER — SULFAMETHOXAZOLE AND TRIMETHOPRIM 400; 80 MG/1; MG/1
1 TABLET ORAL 2 TIMES DAILY
Qty: 20 TABLET | Refills: 0 | Status: SHIPPED | OUTPATIENT
Start: 2025-04-16

## 2025-04-16 RX ORDER — SULFAMETHOXAZOLE AND TRIMETHOPRIM 400; 80 MG/1; MG/1
1 TABLET ORAL 2 TIMES DAILY
Qty: 20 TABLET | Refills: 0 | Status: SHIPPED | OUTPATIENT
Start: 2025-04-16 | End: 2025-04-16

## 2025-04-16 NOTE — TELEPHONE ENCOUNTER
Incoming call from Thai Salazar in [place]  She notes the Rx prescribed today needs to be instead sent to [facility] pharmacy     Pharmacy location change request.   Med pended with updated pharmacy location.    Sahara EDWARDS RN    
no

## 2025-04-16 NOTE — PROGRESS NOTES
Keysha is a 89 year old who is being evaluated via a billable video visit.    How would you like to obtain your AVS? MyChart  If the video visit is dropped, the invitation should be resent by: Send to e-mail at: [email or web address]  Will anyone else be joining your video visit?       Assessment & Plan     Cellulitis of left lower extremity  Start bactrim single strength twice daily for 10 days renal dose based on creatinine clearance estimated at 23 per up to date renal dosing guidelines .    Continue to clean gently daily with warm wash cloth and cover wound with guaze.     Tomorrow after the second dose of anitbiotics start moderate compression with ace wrap for swelling/comfort and to help hematoma resolve.  Since concern for cellulitis do not want to start until two doses of antibiotics completed.     Homecare/[facility] orders reviewed and will be faxed to me to sign/return. Skilled nurse twice daily for one week then weekly for 7 weeks.     Contusion of left lower leg, initial encounter  Ace wrap compression as above.     Skin wound closed with sterile strip  As above.     Please keep me updated via mychart and if not healing as expected or worsening over next 7-10 days we can schedule follow up.       The longitudinal plan of care for the diagnosis(es)/condition(s) as documented were addressed during this visit. Due to the added complexity in care, I will continue to support Keysha in the subsequent management and with ongoing continuity of care.            Subjective   Keysha is a 89 year old, presenting for the following health issues:  No chief complaint on file.      Video Start Time:  11:33    HPI      Video visit with Keysha, her daughter john and home care nurse from [facility] today. Slipped two days ago and his left leg on walker causing what they felt to be a bruise and skin tear.  Staff at facility cleaned wound and felt it was superficial enough to close the skin tear with  steri-strips.  Estimated to be 4cm long by 0.2cm wide. Not bleeding. Concerned about cellulitis today though as has developed an area of spreading srythema and warmth from akle area up to under knee. Somewhat uncomfortable but not extremely so.             Objective           Vitals:  No vitals were obtained today due to virtual visit.    Physical Exam   GENERAL: alert and no distress  EYES: Eyes grossly normal to inspection.  No discharge or erythema, or obvious scleral/conjunctival abnormalities.  RESP: No audible wheeze, cough, or visible cyanosis.    SKIN: Visible skin clear. No significant rash, abnormal pigmentation or lesions.  NEURO: Cranial nerves grossly intact.  Mentation and speech appropriate for age.  PSYCH: Appropriate affect, tone, and pace of words    Able to visualize leg.  Is slightly swollen compared to left.  Has area of diffuse erythema with diffuse borders outlined in pen consistent with cellulitis.  Wound well approximated with steri-strips. Lateral calf has deeper purple color consistent with deeper contusion/hematoma.       Video-Visit Details    Type of service:  Video Visit   Video End Time:11:49 AM  Originating Location (pt. Location): Home    Distant Location (provider location):  On-site  Platform used for Video Visit: Wagner  Signed Electronically by: Joel Daniel Wegener, MD

## 2025-04-16 NOTE — PATIENT INSTRUCTIONS
Assessment & Plan     Cellulitis of left lower extremity  Start bactrim single strength twice daily for 10 days renal dose based on creatinine clearance estimated at 23 per up to date renal dosing guidelines .    Continue to clean gently daily with warm wash cloth and cover wound with guaze.     Tomorrow after the second dose of anitbiotics start moderate compression with ace wrap for swelling/comfort and to help hematoma resolve.  Since concern for cellulitis do not want to start until two doses of antibiotics completed.     Homecare/[facility] orders reviewed and will be faxed to me to sign/return. Skilled nurse twice daily for one week then weekly for 7 weeks.     Contusion of left lower leg, initial encounter  Ace wrap compression as above.     Skin wound closed with sterile strip  As above.     Please keep me updated via mychart and if not healing as expected or worsening over next 7-10 days we can schedule follow up.

## 2025-04-16 NOTE — TELEPHONE ENCOUNTER
Home Care is calling regarding an established patient with [facility].  Requesting orders from: Wegener, Joel Daniel Irwin  RN APPROVED: RN able to provide verbal orders.  Home Care will send orders for signature.  RN will close encounter.  OTHER ACTION: Routed to provider for clarification regarding ACE wrap and PRN Tylenol orders.   Is this a request for a temporary pause in the home care episode?  No    Orders Requested    Skilled Nursing  Request for continuation of care with increase in frequency  Frequency: 2 times a week for 2 weeks;  then 1x week for 7 weeks.   RN gave verbal order: Yes      Physical Therapy  Request for initial evaluation and treatment (one time)   RN gave verbal order: Yes      Wound Care:   Frequency: Q 2 days and/or PRN if soiled;   -Cleanse wound lightly with wound cleansing solution;   -Cover with Border Gauze;   -Apply ACE wrap to affected area.     Above verbal orders given.     Orders needing PCP's review/approval:     [facility] RN asks for clarification for duration of ACE wrap application (until healed? Ok to remove at night while asleep? Until ABX therapy completed?).   RN also inquires if ok to have add Tylenol 325 mg PO Tid PRN for pain.     Phone number Home Care can be reached at: [phone]  Okay to leave a detailed message?: Yes      Janel Lemus RN

## 2025-04-17 NOTE — TELEPHONE ENCOUNTER
Please use ace wrap for the duration of the antibiotic therapy.   Yes ok to use tylenol as noted below.   Thank you!    Joel Wegener,MD

## 2025-04-17 NOTE — TELEPHONE ENCOUNTER
JW,  Writer gave verbal orders for [facility] except the followin)Clarification for duration of ACE wrap application (until healed? Ok to remove at night while asleep? Until ABX therapy completed?).  2) C RN also inquires if ok to have add Tylenol 325 mg PO Tid PRN for pain.  Please advise.    Thank you,  Janel LACEY RN

## 2025-04-17 NOTE — TELEPHONE ENCOUNTER
[facility] RN called back. Relayed PCP's recommendation as PCP's note below.   Janel DALY RN.

## 2025-04-18 ENCOUNTER — TRANSFERRED RECORDS (OUTPATIENT)
Dept: HEALTH INFORMATION MANAGEMENT | Facility: CLINIC | Age: OVER 89
End: 2025-04-18
Payer: MEDICARE

## 2025-04-21 ENCOUNTER — TELEPHONE (OUTPATIENT)
Dept: FAMILY MEDICINE | Facility: CLINIC | Age: OVER 89
End: 2025-04-21
Payer: MEDICARE

## 2025-04-21 NOTE — TELEPHONE ENCOUNTER
Forms/Letter Request    Type of form/letter: OTHER: for verbal order;  SN[facility]- LLE Skin tear     Do we have the form/letter: Yes:     Who is the form from? Home care    Where did/will the form come from? [facility] living    When is form/letter needed by: asap    How would you like the form/letter returned: Fax : [phone]    Patient Notified form requests are processed in 5-7 business days:Yes    Could we send this information to you in [facility] or would you prefer to receive a phone call?:   No preference     Okay to leave a detailed message?: N/A at Other phone number:  [phone] ([facility])

## 2025-04-23 ENCOUNTER — TELEPHONE (OUTPATIENT)
Dept: FAMILY MEDICINE | Facility: CLINIC | Age: OVER 89
End: 2025-04-23
Payer: MEDICARE

## 2025-04-23 NOTE — TELEPHONE ENCOUNTER
Home Care is calling regarding an established patient with [facility].  Requesting orders from: Wegener, Joel Daniel Irwin  RN APPROVED: RN able to provide verbal orders.  Home Care will send orders for signature.  RN will close encounter.  Is this a request for a temporary pause in the home care episode?  No    Orders Requested    Wound Care Supplies    Continue with current orders however, requesting to change wrapping with an tube sock versus ace bandage.   RN gave verbal order: Yes         Phone number Home Care can be reached at: [phone]  Okay to leave a detailed message?: Yes    Contacts       Contact Date/Time Type Contact Phone/Fax    04/23/2025 08:25 AM CDT Phone (Incoming) Simi ROWLEY [phone]          ROBINSON TELLEZ RN

## 2025-04-29 ENCOUNTER — OFFICE VISIT (OUTPATIENT)
Dept: URGENT CARE | Facility: URGENT CARE | Age: OVER 89
End: 2025-04-29
Payer: MEDICARE

## 2025-04-29 ENCOUNTER — ANCILLARY PROCEDURE (OUTPATIENT)
Dept: GENERAL RADIOLOGY | Facility: CLINIC | Age: OVER 89
End: 2025-04-29
Attending: PHYSICIAN ASSISTANT
Payer: MEDICARE

## 2025-04-29 VITALS
HEART RATE: 95 BPM | SYSTOLIC BLOOD PRESSURE: 131 MMHG | HEIGHT: 60 IN | RESPIRATION RATE: 18 BRPM | DIASTOLIC BLOOD PRESSURE: 80 MMHG | TEMPERATURE: 98.6 F | BODY MASS INDEX: 20.22 KG/M2 | WEIGHT: 103 LBS | OXYGEN SATURATION: 96 %

## 2025-04-29 DIAGNOSIS — R09.89 CHEST CONGESTION: ICD-10-CM

## 2025-04-29 DIAGNOSIS — J98.01 BRONCHOSPASM: Primary | ICD-10-CM

## 2025-04-29 DIAGNOSIS — J98.01 BRONCHOSPASM: ICD-10-CM

## 2025-04-29 DIAGNOSIS — R05.9 COUGH, UNSPECIFIED TYPE: ICD-10-CM

## 2025-04-29 DIAGNOSIS — A37.90 PERTUSSIS-LIKE SYNDROME: ICD-10-CM

## 2025-04-29 DIAGNOSIS — R79.89 ELEVATED SERUM CREATININE: ICD-10-CM

## 2025-04-29 LAB
ANION GAP SERPL CALCULATED.3IONS-SCNC: 3 MMOL/L (ref 3–14)
BASOPHILS # BLD AUTO: 0.1 10E3/UL (ref 0–0.2)
BASOPHILS NFR BLD AUTO: 1 %
BUN SERPL-MCNC: 24 MG/DL (ref 7–30)
CALCIUM SERPL-MCNC: 10.7 MG/DL (ref 8.5–10.1)
CHLORIDE BLD-SCNC: 110 MMOL/L (ref 94–109)
CO2 SERPL-SCNC: 25 MMOL/L (ref 20–32)
CREAT SERPL-MCNC: 1.4 MG/DL (ref 0.52–1.04)
EGFRCR SERPLBLD CKD-EPI 2021: 34 ML/MIN/1.73M2
EOSINOPHIL # BLD AUTO: 0.1 10E3/UL (ref 0–0.7)
EOSINOPHIL NFR BLD AUTO: 2 %
ERYTHROCYTE [DISTWIDTH] IN BLOOD BY AUTOMATED COUNT: 14.1 % (ref 10–15)
GLUCOSE BLD-MCNC: 98 MG/DL (ref 70–99)
HCT VFR BLD AUTO: 34 % (ref 35–47)
HGB BLD-MCNC: 10.7 G/DL (ref 11.7–15.7)
IMM GRANULOCYTES # BLD: 0 10E3/UL
IMM GRANULOCYTES NFR BLD: 0 %
LYMPHOCYTES # BLD AUTO: 1.4 10E3/UL (ref 0.8–5.3)
LYMPHOCYTES NFR BLD AUTO: 22 %
MCH RBC QN AUTO: 28.6 PG (ref 26.5–33)
MCHC RBC AUTO-ENTMCNC: 31.5 G/DL (ref 31.5–36.5)
MCV RBC AUTO: 91 FL (ref 78–100)
MONOCYTES # BLD AUTO: 0.7 10E3/UL (ref 0–1.3)
MONOCYTES NFR BLD AUTO: 11 %
NEUTROPHILS # BLD AUTO: 4.1 10E3/UL (ref 1.6–8.3)
NEUTROPHILS NFR BLD AUTO: 64 %
PLATELET # BLD AUTO: 298 10E3/UL (ref 150–450)
POTASSIUM BLD-SCNC: 5.4 MMOL/L (ref 3.4–5.3)
RBC # BLD AUTO: 3.74 10E6/UL (ref 3.8–5.2)
SODIUM SERPL-SCNC: 138 MMOL/L (ref 135–145)
WBC # BLD AUTO: 6.4 10E3/UL (ref 4–11)

## 2025-04-29 PROCEDURE — 99214 OFFICE O/P EST MOD 30 MIN: CPT | Performed by: PHYSICIAN ASSISTANT

## 2025-04-29 PROCEDURE — 87798 DETECT AGENT NOS DNA AMP: CPT | Performed by: PHYSICIAN ASSISTANT

## 2025-04-29 PROCEDURE — 36415 COLL VENOUS BLD VENIPUNCTURE: CPT | Performed by: PHYSICIAN ASSISTANT

## 2025-04-29 PROCEDURE — 3075F SYST BP GE 130 - 139MM HG: CPT | Performed by: PHYSICIAN ASSISTANT

## 2025-04-29 PROCEDURE — 85025 COMPLETE CBC W/AUTO DIFF WBC: CPT | Performed by: PHYSICIAN ASSISTANT

## 2025-04-29 PROCEDURE — 80048 BASIC METABOLIC PNL TOTAL CA: CPT | Performed by: PHYSICIAN ASSISTANT

## 2025-04-29 PROCEDURE — 71046 X-RAY EXAM CHEST 2 VIEWS: CPT | Mod: TC | Performed by: INTERNAL MEDICINE

## 2025-04-29 PROCEDURE — 3079F DIAST BP 80-89 MM HG: CPT | Performed by: PHYSICIAN ASSISTANT

## 2025-04-29 RX ORDER — BENZONATATE 100 MG/1
100 CAPSULE ORAL 3 TIMES DAILY PRN
Qty: 30 CAPSULE | Refills: 0 | Status: SHIPPED | OUTPATIENT
Start: 2025-04-29 | End: 2025-05-09

## 2025-04-29 RX ORDER — AZITHROMYCIN 250 MG/1
TABLET, FILM COATED ORAL
Qty: 6 TABLET | Refills: 0 | Status: SHIPPED | OUTPATIENT
Start: 2025-04-29 | End: 2025-05-04

## 2025-04-29 RX ORDER — ALBUTEROL SULFATE 90 UG/1
2 INHALANT RESPIRATORY (INHALATION) EVERY 6 HOURS
Qty: 8.5 G | Refills: 0 | Status: SHIPPED | OUTPATIENT
Start: 2025-04-29

## 2025-04-29 RX ORDER — PREDNISONE 10 MG/1
10 TABLET ORAL 3 TIMES DAILY
Qty: 15 TABLET | Refills: 0 | Status: SHIPPED | OUTPATIENT
Start: 2025-04-29

## 2025-04-29 NOTE — PROGRESS NOTES
Estimated Creatinine Clearance: 17.7 mL/min (A) (based on SCr of 1.4 mg/dL (H)).  Assessment & Plan     Bronchospasm    Chest xray Negative for acute findings, read by Bobo SNELL at time of visit.  Prednisone and albuterol for bronchospasms  - XR Chest 2 Views  - predniSONE (DELTASONE) 10 MG tablet  Dispense: 15 tablet; Refill: 0  - albuterol (PROVENTIL HFA) 108 (90 Base) MCG/ACT inhaler  Dispense: 8.5 g; Refill: 0    Pertussis-like syndrome    Testing for pertussis  zpak  - B. pertussis/parapertussis PCR-NP  - B. pertussis/parapertussis PCR-NP  - azithromycin (ZITHROMAX) 250 MG tablet  Dispense: 6 tablet; Refill: 0    Chest congestion    Chest xray Negative for acute findings, read by Bobo SNELL at time of visit.    Bronchitis is inflammation of the bronchial tubes, which carry air to the lungs. The tubes swell and produce mucus, or phlegm. The mucus and inflamed bronchial tubes make you cough. You may have trouble breathing.  Most cases of bronchitis are caused by viruses but in your case we are more concerned about a bacterial infection. . Antibiotics usually are helpful in this situation to help you clear this bacterial infection.  Bronchitis usually develops rapidly and lasts about 2 to 3 weeks in otherwise healthy people.    - XR Chest 2 Views  - CBC with platelets and differential  - Basic metabolic panel  (Ca, Cl, CO2, Creat, Gluc, K, Na, BUN)  - CBC with platelets and differential  - Basic metabolic panel  (Ca, Cl, CO2, Creat, Gluc, K, Na, BUN)  - predniSONE (DELTASONE) 10 MG tablet  Dispense: 15 tablet; Refill: 0  - albuterol (PROVENTIL HFA) 108 (90 Base) MCG/ACT inhaler  Dispense: 8.5 g; Refill: 0    Cough, unspecified type    Tessalon for coughing  Pertussis pending  - B. pertussis/parapertussis PCR-NP  - B. pertussis/parapertussis PCR-NP  - benzonatate (TESSALON) 100 MG capsule  Dispense: 30 capsule; Refill: 0    Elevated serum creatinine    Estimated Creatinine Clearance: 17.7  mL/min (A) (based on SCr of 1.4 mg/dL (H)).  No dose adjustment on zpak       At today's visit with Keysha Anders , we discussed results, diagnosis, medications and formulated a plan.  We also discussed red flags for immediate return to clinic/ER, as well as indications for follow up with PCP if not improved in 3 days. Patient understood and agreed to plan. Keysha Anders was discharged with stable vitals and has no further questions.       No follow-ups on file.    Bobo Geiger, [facility], PA-C  [facility] VALERIA Chopra is a 89 year old female who presents to clinic today for the following health issues:  Chief Complaint   Patient presents with    Urgent Care     Coughing for a week now,          4/29/2025     4:02 PM   Additional Questions   Roomed by Hafsa Borjas   Accompanied by son     HPI  Review of Systems  Constitutional, HEENT, cardiovascular, pulmonary, GI, , musculoskeletal, neuro, skin, endocrine and psych systems are negative, except as otherwise noted.      Objective    /80 (BP Location: Right arm, Patient Position: Sitting, Cuff Size: Adult Small)   Pulse 95   Temp 98.6  F (37  C) (Tympanic)   Resp 18   Ht 1.524 m (5')   Wt 46.7 kg (103 lb)   SpO2 96%   BMI 20.12 kg/m    Physical Exam   GENERAL: alert and no distress  EYES: Eyes grossly normal to inspection, PERRL and conjunctivae and sclerae normal  HENT: ear canals and TM's normal, nose and mouth without ulcers or lesions  NECK: no adenopathy, no asymmetry, masses, or scars  RESP: expiratory wheezes generalized with bronchospasms  CV: regular rate and rhythm, normal S1 S2, no S3 or S4, no murmur, click or rub, no peripheral edema  MS: no gross musculoskeletal defects noted, no edema  SKIN: no suspicious lesions or rashes  NEURO: Normal strength and tone, mentation intact and speech normal  PSYCH: mentation appears normal, affect normal/bright      Chest ray Negative for acute findings, read by Bobo  Juanjo SNELL at time of visit.    Results for orders placed or performed in visit on 04/29/25   XR Chest 2 Views     Status: None    Narrative    EXAM: XR CHEST 2 VIEWS  LOCATION: [facility]  DATE: 4/29/2025    INDICATION:  Bronchospasm, Chest congestion  COMPARISON: 4/24/2025.      Impression    IMPRESSION: Stable chest. No acute airspace opacity, pleural effusion, or pneumothorax. Normal heart size and pulmonary vascularity. Mildly tortuous thoracic aorta. Prominent gastric air bubble.   Results for orders placed or performed in visit on 04/29/25   Basic metabolic panel  (Ca, Cl, CO2, Creat, Gluc, K, Na, BUN)     Status: Abnormal   Result Value Ref Range    Sodium 138 135 - 145 mmol/L    Potassium 5.4 (H) 3.4 - 5.3 mmol/L    Chloride 110 (H) 94 - 109 mmol/L    Carbon Dioxide (CO2) 25 20 - 32 mmol/L    Anion Gap 3 3 - 14 mmol/L    Urea Nitrogen 24 7 - 30 mg/dL    Creatinine 1.40 (H) 0.52 - 1.04 mg/dL    GFR Estimate 34 (L) >60 mL/min/1.73m2    Calcium 10.7 (H) 8.5 - 10.1 mg/dL    Glucose 98 70 - 99 mg/dL   CBC with platelets and differential     Status: Abnormal   Result Value Ref Range    WBC Count 6.4 4.0 - 11.0 10e3/uL    RBC Count 3.74 (L) 3.80 - 5.20 10e6/uL    Hemoglobin 10.7 (L) 11.7 - 15.7 g/dL    Hematocrit 34.0 (L) 35.0 - 47.0 %    MCV 91 78 - 100 fL    MCH 28.6 26.5 - 33.0 pg    MCHC 31.5 31.5 - 36.5 g/dL    RDW 14.1 10.0 - 15.0 %    Platelet Count 298 150 - 450 10e3/uL    % Neutrophils 64 %    % Lymphocytes 22 %    % Monocytes 11 %    % Eosinophils 2 %    % Basophils 1 %    % Immature Granulocytes 0 %    Absolute Neutrophils 4.1 1.6 - 8.3 10e3/uL    Absolute Lymphocytes 1.4 0.8 - 5.3 10e3/uL    Absolute Monocytes 0.7 0.0 - 1.3 10e3/uL    Absolute Eosinophils 0.1 0.0 - 0.7 10e3/uL    Absolute Basophils 0.1 0.0 - 0.2 10e3/uL    Absolute Immature Granulocytes 0.0 <=0.4 10e3/uL   CBC with platelets and differential     Status: Abnormal    Narrative    The following orders were created  for panel order CBC with platelets and differential.  Procedure                               Abnormality         Status                     ---------                               -----------         ------                     CBC with platelets and ...[[id number]]  Abnormal            Final result                 Please view results for these tests on the individual orders.

## 2025-04-29 NOTE — PROGRESS NOTES
Urgent Care Clinic Visit    Chief Complaint   Patient presents with    Urgent Care     Coughing for a week now,                4/29/2025     4:02 PM   Additional Questions   Roomed by Hafsa Borjas   Accompanied by son

## 2025-05-01 LAB
B PARAPERT DNA SPEC QL NAA+PROBE: NOT DETECTED
B PERT DNA SPEC QL NAA+PROBE: NOT DETECTED

## 2025-05-25 ENCOUNTER — MEDICAL CORRESPONDENCE (OUTPATIENT)
Dept: HEALTH INFORMATION MANAGEMENT | Facility: CLINIC | Age: OVER 89
End: 2025-05-25
Payer: MEDICARE

## 2025-05-27 ENCOUNTER — TELEPHONE (OUTPATIENT)
Dept: FAMILY MEDICINE | Facility: CLINIC | Age: OVER 89
End: 2025-05-27
Payer: MEDICARE

## 2025-05-27 NOTE — TELEPHONE ENCOUNTER
Forms/Letter Request    Type of form/letter: OTHER: should albuterol be discontinued after URI ?         Do we have the form/letter: Yes:  order     Who is the form from? Home care    Where did/will the form come from? form was faxed in    When is form/letter needed by: asap fwd order to wegener    How would you like the form/letter returned: Fax : [phone]    Patient Notified form requests are processed in 5-7 business days:N/A    Could we send this information to you in [employer] or would you prefer to receive a phone call?:   No preference   Okay to leave a detailed message?: N/A at Cell number on file:    Telephone Information:        or Other phone number:  [phone]

## 2025-05-29 ENCOUNTER — TELEPHONE (OUTPATIENT)
Dept: FAMILY MEDICINE | Facility: CLINIC | Age: OVER 89
End: 2025-05-29
Payer: MEDICARE

## 2025-05-29 NOTE — TELEPHONE ENCOUNTER
OLIVER,    SOLEDAD.    Homecare calling with update.  Planning to discharge from home care.  Patient/family do not feel she needs services anymore as her wound is healed and she is doing well.  Discharge will occur on June 12th.  Can reach homecare nurse at [phone] with questions.    RN was able to speak with daughter Meka as well- confirmed above is accurate.    Lola RAGSDALE RN

## 2025-05-29 NOTE — TELEPHONE ENCOUNTER
Forms faxed to [facility] fax # [phone] and copy sent to stat scan.     Cassidy SHEPHERD

## 2025-06-27 SDOH — HEALTH STABILITY: PHYSICAL HEALTH: ON AVERAGE, HOW MANY DAYS PER WEEK DO YOU ENGAGE IN MODERATE TO STRENUOUS EXERCISE (LIKE A BRISK WALK)?: 0 DAYS

## 2025-06-27 SDOH — HEALTH STABILITY: PHYSICAL HEALTH: ON AVERAGE, HOW MANY MINUTES DO YOU ENGAGE IN EXERCISE AT THIS LEVEL?: PATIENT DECLINED

## 2025-06-27 ASSESSMENT — SOCIAL DETERMINANTS OF HEALTH (SDOH): HOW OFTEN DO YOU GET TOGETHER WITH FRIENDS OR RELATIVES?: MORE THAN THREE TIMES A WEEK

## 2025-06-30 NOTE — PROGRESS NOTES
Preventive Care Visit  [facility]  Joel Daniel Wegener, MD, Family Medicine  Jul 1, 2025      Assessment & Plan     Encounter for Medicare annual wellness exam      Rotator cuff right shoulder:  taught home exercises to do (daughter present and taught as well) and recommend icing 3-4 times /day for 4-5 days.  Consider cortisone injection if not improving and/or interfering substantially with activities (dressing, playing cards, etc.)     Alzheimer's/dementia:  did see improvement with donepezil (aricept).  Tolerating well.  Reviewed common and serious potential side effects of memantine (namenda) and elected to start 5mg/day and titrate up to 20mg if able. Can  send [employer] messages to troubleshoot.     Depression/mood:  daughter also feels has seen substantial improvement with sertraline, plan to continue.  Keysha reports feeling content/happy.  Her daughter sees her daily.     Left lower leg swelling - mild.  May just be vein asymmetry.  Discussed potential more serious causes but elected not to search for them (such as abdominal mass).  No reason to think it is from heart failure or liver/kidney failure especially since only in one leg.       Mammogram: no previous results upon chart review.     -DEXA: Last done 1/2022 (impression: osteoporosis). Provider to review if continued screening is appropriate due to age.     -PAP: no results upon chart review.     -Colon Cancer Screening: Last done via colonoscopy on 9/2012. Previously discontinued due to age.     -Dermatology: Pt verbalized they do not meet with dermatology regularly.     -Immunizations: Patient is due/able to receive at clinic today: Covid -given    Patient is due for the following vaccines: Shingles. Advised patient to receive at a pharmacy due to Medicare insurance coverage.     Follow up late October/mid november for mood/dementia follow up.      Refills/labs for monitoring of chronic problems as below.     Rotator cuff  syndrome, right      Localized swelling of left lower leg      Alzheimer's disease (H)    - donepezil (ARICEPT) 10 MG tablet; Take 1 tablet (10 mg) by mouth at bedtime.  - memantine (NAMENDA) 5 MG tablet; Start one tablet daily and increase by one tab per day every 1-2 weeks up to 4 tablets (20mg) daily.    Current moderate episode of major depressive disorder without prior episode (H)      6/19/2024    12:20 PM 12/26/2024     3:11 PM 7/1/2025     9:22 AM   PHQ   PHQ-9 Total Score 5 0  0    Q9: Thoughts of better off dead/self-harm past 2 weeks Not at all Not at all Not at all       Patient-reported     Phq9 concordant with history.   - sertraline (ZOLOFT) 100 MG tablet; Take 1 tablet (100 mg) by mouth daily.    Gastroesophageal reflux disease without esophagitis    - omeprazole (PRILOSEC) 20 MG DR capsule; Take 1 capsule (20 mg) by mouth daily.  - calcium carbonate (CALCIUM ANTACID) 500 MG chewable tablet; Take 1 tablet (500 mg) by mouth 2 times daily.    Hypothyroidism, unspecified type    - TSH WITH FREE T4 REFLEX; Future  - levothyroxine (SYNTHROID/LEVOTHROID) 25 MCG tablet; Take 1 tablet (25 mcg) by mouth daily.    Hypovitaminosis D    - vitamin D3 (CHOLECALCIFEROL) 50 mcg (2000 units) tablet; Take 1 tablet (50 mcg) by mouth daily.  - 25- OH-Vitamin D; Future    Iron deficiency anemia, unspecified iron deficiency anemia type    - ferrous sulfate (FE TABS) 325 (65 Fe) MG EC tablet; Take 1 tablet (325 mg) by mouth daily.    Small vessel stroke (H)    - aspirin (ASPIRIN LOW DOSE) 81 MG chewable tablet; Take 1 tablet (81 mg) by mouth daily.    Encounter for vitamin deficiency screening    - 25- OH-Vitamin D; Future    Encounter for therapeutic drug monitoring    - Comprehensive metabolic panel; Future  - CBC with platelets; Future    CARDIOVASCULAR SCREENING; LDL GOAL LESS THAN 130    - Lipid panel reflex to direct LDL Fasting; Future    Need for vaccination      Patient has been advised of split billing  requirements and indicates understanding: Yes    In addition to preventive health exam and counseling 35 minutes spent on the date of the encounter doing chart review, history and exam, negotiating and explaining the plan with the patient, documentation and further activities as noted above.      The longitudinal plan of care for the diagnosis(es)/condition(s) as documented were addressed during this visit. Due to the added complexity in care, I will continue to support Keysha in the subsequent management and with ongoing continuity of care.        Reviewed preventive health counseling, as reflected in patient instructions  Counseling  Appropriate preventive services were addressed with this patient via screening, questionnaire, or discussion as appropriate for fall prevention, nutrition, physical activity, Tobacco-use cessation, social engagement, weight loss and cognition.  Checklist reviewing preventive services available has been given to the patient.  Reviewed patient's diet, addressing concerns and/or questions.   Updated plan of care.  Patient reported difficulty with activities of daily living were addressed today.The patient was provided with written information regarding signs of hearing loss.         Follow-up   Return for Help schedule follow up for mood/dementia late October/mid November, AVS, lab, leave.     Follow-up Visit   Expected date: Nov 11, 2025      Follow Up Appointment Details:     Follow-up with whom?: Me    Follow-Up for what?: Chronic Disease f/u    Chronic Disease f/u: Depression Comment - and dementia    How?: In Person    Is this an as-needed follow-up?: No             Follow-up Visit   Expected date:  Jul 08, 2026 (Approximate)      Follow Up Appointment Details:     Follow-up with whom?: PCP    Follow-Up for what?: Medicare Wellness    Welcome or Annual?: Annual Wellness    How?: In Person                 Melody Chopra is a 89 year old, presenting for the following:  Physical  (AWV)        7/1/2025     9:37 AM   Additional Questions   Roomed by HALLEY Nickerson   Accompanied by Yenny - daughter           HPI           Advance Care Planning    Document on file is a Health Care Directive or POLST.        6/27/2025   General Health   How would you rate your overall physical health? (!) FAIR   Feel stress (tense, anxious, or unable to sleep) Only a little   (!) STRESS CONCERN      6/27/2025   Nutrition   Diet: Regular (no restrictions)         6/27/2025   Exercise   Days per week of moderate/strenous exercise 0 days   Average minutes spent exercising at this level Patient declined   (!) EXERCISE CONCERN      6/27/2025   Social Factors   Frequency of gathering with friends or relatives More than three times a week   Worry food won't last until get money to buy more No   Food not last or not have enough money for food? No   Do you have housing? (Housing is defined as stable permanent housing and does not include staying outside in a car, in a tent, in an abandoned building, in an overnight shelter, or couch-surfing.) Yes   Are you worried about losing your housing? No   Lack of transportation? No   Unable to get utilities (heat,electricity)? No         6/27/2025   Fall Risk   Fallen 2 or more times in the past year? No   Trouble with walking or balance? No          6/27/2025   Activities of Daily Living- Home Safety   Needs help with the following daily activites Telephone use    Transportation    Shopping    Preparing meals    Housework    Bathing    Laundry    Medication administration    Money management   Do you have the help that you need? Yes for Some   Safety concerns in the home None of the above       Multiple values from one day are sorted in reverse-chronological order         6/27/2025   Dental   Dentist two times every year? (!) DECLINE         6/27/2025   Hearing Screening   Hearing concerns? (!) I FEEL THAT PEOPLE ARE MUMBLING OR NOT SPEAKING CLEARLY.    (!) I NEED TO ASK PEOPLE TO SPEAK  UP OR REPEAT THEMSELVES.    (!) IT'S HARD TO FOLLOW A CONVERSATION IN A NOISY RESTAURANT OR CROWDED ROOM.    (!) TROUBLE UNDESTANDING A SPEAKER IN A PUBLIC MEETING OR [employer] witness SERVICE.    (!) TROUBLE UNDERSTANDING SPEECH ON THE TELEPHONE   Would you like a referral for hearing testing? I already have hearing aids       Multiple values from one day are sorted in reverse-chronological order         2025   Driving Risk Screening   Patient/family members have concerns about driving (!) DECLINE         2025   General Alertness/Fatigue Screening   Have you been more tired than usual lately? No         2025   Urinary Incontinence Screening   Bothered by leaking urine in past 6 months No         Today's PHQ-9 Score:       2025     9:22 AM   PHQ-9 SCORE   PHQ-9 Total Score MyChart 0   PHQ-9 Total Score 0        Patient-reported           2025   Substance Use   Alcohol more than 3/day or more than 7/wk Not Applicable   Do you have a current opioid prescription? No   How severe/bad is pain from 1 to 10? 10   Do you use any other substances recreationally? No     Social History     Tobacco Use    Smoking status: Former     Current packs/day: 0.00     Average packs/day: 0.4 packs/day for 92.4 years (33.0 ttl pk-yrs)     Types: Cigarettes     Start date: 1947     Quit date:      Years since quittin.5    Smokeless tobacco: Never   Vaping Use    Vaping status: Never Used   Substance Use Topics    Alcohol use: Not Currently    Drug use: Never                    Reviewed and updated as needed this visit by Provider                    Past Medical History:   Diagnosis Date    Cerebral infarction (H)     Depressive disorder     Thyroid disease      Past Surgical History:   Procedure Laterality Date    ABDOMEN SURGERY      BACK SURGERY      COLONOSCOPY      EYE SURGERY      GENITOURINARY SURGERY      GYN SURGERY       Current providers sharing in care for this patient include:  Patient Care  "Team:  Wegener, Joel Daniel Irwin, MD as PCP - General (Family Medicine)  Wegener, Joel Daniel Irwin, MD as Assigned PCP  Meg Morelos OD as Assigned Surgical Provider    The following health maintenance items are reviewed in Epic and correct as of today:  Health Maintenance   Topic Date Due    ZOSTER VACCINE (1 of 2) Never done    TSH W/FREE T4 REFLEX  06/24/2025    INFLUENZA VACCINE (1) 09/01/2025    PHQ-9  01/01/2026    MEDICARE ANNUAL WELLNESS VISIT  07/01/2026    ANNUAL REVIEW OF HM ORDERS  07/01/2026    FALL RISK ASSESSMENT  07/01/2026    DTAP/TDAP/TD VACCINE (5 - Td or Tdap) 09/10/2028    ADVANCE CARE PLANNING  07/01/2030    DEPRESSION ACTION PLAN  Completed    PNEUMOCOCCAL VACCINE 50+ YEARS  Completed    RSV VACCINE  Completed    COVID-19 VACCINE  Completed    HPV VACCINE  Aged Out    MENINGITIS VACCINE  Aged Out         Review of Systems  Constitutional, neuro, ENT, endocrine, pulmonary, cardiac, gastrointestinal, genitourinary, musculoskeletal, integument and psychiatric systems are negative, except as otherwise noted.     Objective    Exam  /65 (BP Location: Right arm, Patient Position: Sitting, Cuff Size: Adult Small)   Pulse 76   Temp 98.4  F (36.9  C) (Temporal)   Resp 16   Ht 1.543 m (5' 0.75\")   Wt 47.9 kg (105 lb 9.6 oz)   SpO2 99%   BMI 20.12 kg/m     Estimated body mass index is 20.12 kg/m  as calculated from the following:    Height as of this encounter: 1.543 m (5' 0.75\").    Weight as of this encounter: 47.9 kg (105 lb 9.6 oz).    Physical Exam  GENERAL: alert and no distress  EYES: Eyes grossly normal to inspection, PERRL and conjunctivae and sclerae normal  NECK: no adenopathy, no asymmetry, masses, or scars  RESP: lungs clear to auscultation - no rales, rhonchi or wheezes  CV: regular rate and rhythm, normal S1 S2, no S3 or S4, no murmur, click or rub, no peripheral edema  PSYCH: mentation appears normal, affect normal/bright  1+ left ankle/foot swelling without skin " breakdown.       right shoulder  Pain with palpation of superior shoulder joint.    No AC joint pain, no biceps tendon pain.  scapular motion with active abduction and pain at 90 degrees      Strength (out of 5)  Supraspinatus (empty can)- 4                     7/1/2025   Mini Cog   Mini-Cog Not Completed (choose reason) Known dementia   Clock Draw Score 0 Abnormal   3 Item Recall 0 objects recalled   Mini Cog Total Score 0              Signed Electronically by: Joel Daniel Wegener, MD    Answers submitted by the patient for this visit:  Patient Health Questionnaire (Submitted on 7/1/2025)  PHQ9 TOTAL SCORE: 0

## 2025-07-01 ENCOUNTER — OFFICE VISIT (OUTPATIENT)
Dept: FAMILY MEDICINE | Facility: CLINIC | Age: OVER 89
End: 2025-07-01
Attending: FAMILY MEDICINE
Payer: MEDICARE

## 2025-07-01 ENCOUNTER — TELEPHONE (OUTPATIENT)
Dept: FAMILY MEDICINE | Facility: CLINIC | Age: OVER 89
End: 2025-07-01

## 2025-07-01 VITALS
OXYGEN SATURATION: 99 % | DIASTOLIC BLOOD PRESSURE: 65 MMHG | HEIGHT: 61 IN | HEART RATE: 76 BPM | RESPIRATION RATE: 16 BRPM | BODY MASS INDEX: 19.94 KG/M2 | SYSTOLIC BLOOD PRESSURE: 116 MMHG | WEIGHT: 105.6 LBS | TEMPERATURE: 98.4 F

## 2025-07-01 DIAGNOSIS — Z13.6 CARDIOVASCULAR SCREENING; LDL GOAL LESS THAN 130: ICD-10-CM

## 2025-07-01 DIAGNOSIS — Z00.00 ENCOUNTER FOR MEDICARE ANNUAL WELLNESS EXAM: Primary | ICD-10-CM

## 2025-07-01 DIAGNOSIS — R22.42 LOCALIZED SWELLING OF LEFT LOWER LEG: ICD-10-CM

## 2025-07-01 DIAGNOSIS — K21.9 GASTROESOPHAGEAL REFLUX DISEASE WITHOUT ESOPHAGITIS: ICD-10-CM

## 2025-07-01 DIAGNOSIS — F02.80 ALZHEIMER'S DISEASE (H): ICD-10-CM

## 2025-07-01 DIAGNOSIS — E55.9 HYPOVITAMINOSIS D: ICD-10-CM

## 2025-07-01 DIAGNOSIS — Z13.21 ENCOUNTER FOR VITAMIN DEFICIENCY SCREENING: ICD-10-CM

## 2025-07-01 DIAGNOSIS — F32.1 CURRENT MODERATE EPISODE OF MAJOR DEPRESSIVE DISORDER WITHOUT PRIOR EPISODE (H): ICD-10-CM

## 2025-07-01 DIAGNOSIS — D50.9 IRON DEFICIENCY ANEMIA, UNSPECIFIED IRON DEFICIENCY ANEMIA TYPE: ICD-10-CM

## 2025-07-01 DIAGNOSIS — I63.9 SMALL VESSEL STROKE (H): ICD-10-CM

## 2025-07-01 DIAGNOSIS — G30.9 ALZHEIMER'S DISEASE (H): ICD-10-CM

## 2025-07-01 DIAGNOSIS — Z23 NEED FOR VACCINATION: ICD-10-CM

## 2025-07-01 DIAGNOSIS — E03.9 HYPOTHYROIDISM, UNSPECIFIED TYPE: ICD-10-CM

## 2025-07-01 DIAGNOSIS — M75.101 ROTATOR CUFF SYNDROME, RIGHT: ICD-10-CM

## 2025-07-01 DIAGNOSIS — Z51.81 ENCOUNTER FOR THERAPEUTIC DRUG MONITORING: ICD-10-CM

## 2025-07-01 LAB
ALBUMIN SERPL BCG-MCNC: 4.2 G/DL (ref 3.5–5.2)
ALP SERPL-CCNC: 86 U/L (ref 40–150)
ALT SERPL W P-5'-P-CCNC: 5 U/L (ref 0–50)
ANION GAP SERPL CALCULATED.3IONS-SCNC: 10 MMOL/L (ref 7–15)
AST SERPL W P-5'-P-CCNC: 18 U/L (ref 0–45)
BILIRUB SERPL-MCNC: 0.2 MG/DL
BUN SERPL-MCNC: 19.2 MG/DL (ref 8–23)
CALCIUM SERPL-MCNC: 10.9 MG/DL (ref 8.8–10.4)
CHLORIDE SERPL-SCNC: 106 MMOL/L (ref 98–107)
CHOLEST SERPL-MCNC: 201 MG/DL
CREAT SERPL-MCNC: 1.06 MG/DL (ref 0.51–0.95)
EGFRCR SERPLBLD CKD-EPI 2021: 48 ML/MIN/1.73M2
ERYTHROCYTE [DISTWIDTH] IN BLOOD BY AUTOMATED COUNT: 12.8 % (ref 10–15)
FASTING STATUS PATIENT QL REPORTED: NO
FASTING STATUS PATIENT QL REPORTED: NO
GLUCOSE SERPL-MCNC: 88 MG/DL (ref 70–99)
HCO3 SERPL-SCNC: 26 MMOL/L (ref 22–29)
HCT VFR BLD AUTO: 29 % (ref 35–47)
HDLC SERPL-MCNC: 72 MG/DL
HGB BLD-MCNC: 9.1 G/DL (ref 11.7–15.7)
LDLC SERPL CALC-MCNC: 116 MG/DL
MCH RBC QN AUTO: 27.6 PG (ref 26.5–33)
MCHC RBC AUTO-ENTMCNC: 31.4 G/DL (ref 31.5–36.5)
MCV RBC AUTO: 88 FL (ref 78–100)
NONHDLC SERPL-MCNC: 129 MG/DL
PLATELET # BLD AUTO: 250 10E3/UL (ref 150–450)
POTASSIUM SERPL-SCNC: 5 MMOL/L (ref 3.4–5.3)
PROT SERPL-MCNC: 6.4 G/DL (ref 6.4–8.3)
RBC # BLD AUTO: 3.3 10E6/UL (ref 3.8–5.2)
SODIUM SERPL-SCNC: 142 MMOL/L (ref 135–145)
TRIGL SERPL-MCNC: 66 MG/DL
TSH SERPL DL<=0.005 MIU/L-ACNC: 1.7 UIU/ML (ref 0.3–4.2)
VIT D+METAB SERPL-MCNC: 41 NG/ML (ref 20–50)
WBC # BLD AUTO: 5.2 10E3/UL (ref 4–11)

## 2025-07-01 PROCEDURE — 82306 VITAMIN D 25 HYDROXY: CPT | Performed by: FAMILY MEDICINE

## 2025-07-01 PROCEDURE — 3078F DIAST BP <80 MM HG: CPT | Performed by: FAMILY MEDICINE

## 2025-07-01 PROCEDURE — 85027 COMPLETE CBC AUTOMATED: CPT | Performed by: FAMILY MEDICINE

## 2025-07-01 PROCEDURE — 3074F SYST BP LT 130 MM HG: CPT | Performed by: FAMILY MEDICINE

## 2025-07-01 PROCEDURE — 1126F AMNT PAIN NOTED NONE PRSNT: CPT | Performed by: FAMILY MEDICINE

## 2025-07-01 PROCEDURE — 36415 COLL VENOUS BLD VENIPUNCTURE: CPT | Performed by: FAMILY MEDICINE

## 2025-07-01 PROCEDURE — 91320 SARSCV2 VAC 30MCG TRS-SUC IM: CPT | Performed by: FAMILY MEDICINE

## 2025-07-01 PROCEDURE — 80061 LIPID PANEL: CPT | Performed by: FAMILY MEDICINE

## 2025-07-01 PROCEDURE — G2211 COMPLEX E/M VISIT ADD ON: HCPCS | Performed by: FAMILY MEDICINE

## 2025-07-01 PROCEDURE — 90480 ADMN SARSCOV2 VAC 1/ONLY CMP: CPT | Performed by: FAMILY MEDICINE

## 2025-07-01 PROCEDURE — 99214 OFFICE O/P EST MOD 30 MIN: CPT | Mod: 25 | Performed by: FAMILY MEDICINE

## 2025-07-01 PROCEDURE — 80053 COMPREHEN METABOLIC PANEL: CPT | Performed by: FAMILY MEDICINE

## 2025-07-01 PROCEDURE — G0439 PPPS, SUBSEQ VISIT: HCPCS | Performed by: FAMILY MEDICINE

## 2025-07-01 PROCEDURE — 84443 ASSAY THYROID STIM HORMONE: CPT | Performed by: FAMILY MEDICINE

## 2025-07-01 RX ORDER — MEMANTINE HYDROCHLORIDE 5 MG/1
TABLET ORAL
Qty: 90 TABLET | Refills: 0 | Status: SHIPPED | OUTPATIENT
Start: 2025-07-01

## 2025-07-01 RX ORDER — FERROUS SULFATE 325(65) MG
325 TABLET, DELAYED RELEASE (ENTERIC COATED) ORAL DAILY
Qty: 90 TABLET | Refills: 3 | Status: SHIPPED | OUTPATIENT
Start: 2025-07-01

## 2025-07-01 RX ORDER — DONEPEZIL HYDROCHLORIDE 10 MG/1
10 TABLET, FILM COATED ORAL AT BEDTIME
Qty: 90 TABLET | Refills: 3 | Status: SHIPPED | OUTPATIENT
Start: 2025-07-01

## 2025-07-01 RX ORDER — CALCIUM CARBONATE 500 MG/1
1 TABLET, CHEWABLE ORAL 2 TIMES DAILY
Qty: 90 TABLET | Refills: 3 | Status: SHIPPED | OUTPATIENT
Start: 2025-07-01

## 2025-07-01 RX ORDER — LEVOTHYROXINE SODIUM 25 UG/1
25 TABLET ORAL DAILY
Qty: 30 TABLET | Refills: 1 | Status: SHIPPED | OUTPATIENT
Start: 2025-07-01

## 2025-07-01 RX ORDER — CHOLECALCIFEROL (VITAMIN D3) 50 MCG
1 TABLET ORAL DAILY
Qty: 90 TABLET | Refills: 3 | Status: SHIPPED | OUTPATIENT
Start: 2025-07-01

## 2025-07-01 RX ORDER — SERTRALINE HYDROCHLORIDE 100 MG/1
100 TABLET, FILM COATED ORAL DAILY
Qty: 90 TABLET | Refills: 3 | Status: SHIPPED | OUTPATIENT
Start: 2025-07-01

## 2025-07-01 RX ORDER — OMEPRAZOLE 20 MG/1
20 CAPSULE, DELAYED RELEASE ORAL DAILY
Qty: 90 CAPSULE | Refills: 3 | Status: SHIPPED | OUTPATIENT
Start: 2025-07-01

## 2025-07-01 RX ORDER — ASPIRIN 81 MG/1
81 TABLET, CHEWABLE ORAL DAILY
Qty: 90 TABLET | Refills: 3 | Status: SHIPPED | OUTPATIENT
Start: 2025-07-01

## 2025-07-01 ASSESSMENT — PAIN SCALES - GENERAL: PAINLEVEL_OUTOF10: NO PAIN (0)

## 2025-07-01 ASSESSMENT — PATIENT HEALTH QUESTIONNAIRE - PHQ9
SUM OF ALL RESPONSES TO PHQ QUESTIONS 1-9: 0
SUM OF ALL RESPONSES TO PHQ QUESTIONS 1-9: 0

## 2025-07-01 NOTE — NURSING NOTE
"Patient received COVID vaccine per standing orders. Patient given VIS form(s) prior to immunization administration.   Reviewed patient's allergies, temperature WNL.  Prior to immunization administration, this RN verified patient's identity by having patient verbalize first and last name and date of birth.     COVID VACCINE SCREENING QUESTIONS  If the patient(or guardian) answers \"Yes\" to any of the following questions, do not administer the vaccine and consult with a provider.     Does the pt have a temperature greater than 100.5? No    Has the patient ever had a serious reaction to a COVID vaccine or something in a COVID vaccine, like polyethylene glycol (PEG) or polysorbate? No    Has the patient had myocarditis or pericarditis (inflamation of or around the the heart muscle) after getting a COVID-19 vaccine? No    Has the pt received a bone marrow transplant within the last 6 months? No    Has the patient had Multisystem Inflammatory Syndrome from COVID-19 in the past 90 days? No    Patient instructed to remain in clinic for 15 (or 30) minutes afterwards, and to report any adverse reactions? Yes    Updated by: Krishan Valencia, RN     Patient was instructed to remain in clinic for 15 minutes afterwards and to report any adverse reactions.     - Krishan ZENG, BSN, RN     "

## 2025-07-01 NOTE — TELEPHONE ENCOUNTER
Tiara FELIPE at [facility] pharmacy calling with memantine medication instruction clarification  Patient lives in group home that has a memantine titration pack available with the following instructions:   5mg once daily for 1 week  5mg BID for 1 week   10mg in AM and 5mg in at bedtime for 1 week   10mg BID     Can the pharmacy use these titration pack instructions instead?   Please advise    Thanks,  Jazz PADILLA RN

## 2025-07-01 NOTE — PATIENT INSTRUCTIONS
Rotator cuff right shoulder:  taught home exercises to do (daughter present and taught as well) and recommend icing 3-4 times /day for 4-5 days.  Consider cortisone injection if not improving and/or interfering substantially with activities (dressing, playing cards, etc.)     Alzheimer's/dementia:  did see improvement with donepezil (aricept).  Tolerating well.  Reviewed common and serious potential side effects of memantine (namenda) and elected to start 5mg/day and titrate up to 20mg if able.     Depression/mood:  daughter also feels has seen substantial improvement with sertraline, plan to continue.  Keysha reports feeling content/happy.  Her daughter sees her daily.     Left lower leg swelling - mild.  May just be vein asymmetry.  Discussed potential more serious causes but elected not to search for them (such as abdominal mass).  No reason to think it is from heart failure or liver/kidney failure especially since only in one leg.       Mammogram: no previous results upon chart review.     -DEXA: Last done 1/2022 (impression: osteoporosis). Provider to review if continued screening is appropriate due to age.     -PAP: no results upon chart review.     -Colon Cancer Screening: Last done via colonoscopy on 9/2012. Previously discontinued due to age.     -Dermatology: Pt verbalized they do not meet with dermatology regularly.     -Immunizations: Patient is due/able to receive at clinic today: Covid -given    Patient is due for the following vaccines: Shingles. Advised patient to receive at a pharmacy due to Medicare insurance coverage.     Follow up late October/mid november for mood/dementia follow up.            Patient Education   Preventive Care Advice   This is general advice given by our system to help you stay healthy. However, your care team may have specific advice just for you. Please talk to your care team about your preventive care needs.  Nutrition  Eat 5 or more servings of fruits and vegetables  each day.  Try wheat bread, brown rice and whole grain pasta (instead of white bread, rice, and pasta).  Get enough calcium and vitamin D. Check the label on foods and aim for 100% of the RDA (recommended daily allowance).  Lifestyle  Exercise at least 150 minutes each week  (30 minutes a day, 5 days a week).  Do muscle strengthening activities 2 days a week. These help control your weight and prevent disease.  No smoking.  Wear sunscreen to prevent skin cancer.  Have a dental exam and cleaning every 6 months.  Yearly exams  See your health care team every year to talk about:  Any changes in your health.  Any medicines your care team has prescribed.  Preventive care, family planning, and ways to prevent chronic diseases.  Shots (vaccines)   HPV shots (up to age 26), if you've never had them before.  Hepatitis B shots (up to age 59), if you've never had them before.  COVID-19 shot: Get this shot when it's due.  Flu shot: Get a flu shot every year.  Tetanus shot: Get a tetanus shot every 10 years.  Pneumococcal, hepatitis A, and RSV shots: Ask your care team if you need these based on your risk.  Shingles shot (for age 50 and up)  General health tests  Diabetes screening:  Starting at age 35, Get screened for diabetes at least every 3 years.  If you are younger than age 35, ask your care team if you should be screened for diabetes.  Cholesterol test: At age 39, start having a cholesterol test every 5 years, or more often if advised.  Bone density scan (DEXA): At age 50, ask your care team if you should have this scan for osteoporosis (brittle bones).  Hepatitis C: Get tested at least once in your life.  STIs (sexually transmitted infections)  Before age 24: Ask your care team if you should be screened for STIs.  After age 24: Get screened for STIs if you're at risk. You are at risk for STIs (including HIV) if:  You are sexually active with more than one person.  You don't use condoms every time.  You or a partner was  diagnosed with a sexually transmitted infection.  If you are at risk for HIV, ask about PrEP medicine to prevent HIV.  Get tested for HIV at least once in your life, whether you are at risk for HIV or not.  Cancer screening tests  Cervical cancer screening: If you have a cervix, begin getting regular cervical cancer screening tests starting at age 21.  Breast cancer scan (mammogram): If you've ever had breasts, begin having regular mammograms starting at age 40. This is a scan to check for breast cancer.  Colon cancer screening: It is important to start screening for colon cancer at age 45.  Have a colonoscopy test every 10 years (or more often if you're at risk) Or, ask your provider about stool tests like a FIT test every year or Cologuard test every 3 years.  To learn more about your testing options, visit:   .  For help making a decision, visit:   https://bit.ly/gx69823.  Prostate cancer screening test: If you have a prostate, ask your care team if a prostate cancer screening test (PSA) at age 55 is right for you.  Lung cancer screening: If you are a current or former smoker ages 50 to 80, ask your care team if ongoing lung cancer screenings are right for you.  For informational purposes only. Not to replace the advice of your health care provider. Copyright   2023 [facility]. All rights reserved. Clinically reviewed by the [facility]. [facility] [id number] - REV 01/24.  Learning About Activities of Daily Living  What are activities of daily living?     Activities of daily living (ADLs) are the basic self-care tasks you do every day. These include eating, bathing, dressing, and moving around.  As you age, and if you have health problems, you may find that it's harder to do some of these tasks. If so, your doctor can suggest ideas that may help.  To measure what kind of help you may need, your doctor will ask how well you are able to do ADLs. Let your doctor know if there are  any tasks that you are having trouble doing. This is an important first step to getting help. And when you have the help you need, you can stay as independent as possible.  How will a doctor assess your ADLs?  Asking about ADLs is part of a routine health checkup your doctor will likely do as you age. Your health check might be done in a doctor's office, in your home, or at a hospital. The goal is to find out if you are having any problems that could make it hard to care for yourself or that make it unsafe for you to be on your own.  To measure your ADLs, your doctor will ask how hard it is for you to do routine tasks. Your doctor may also want to know if you have changed the way you do a task because of a health problem. Your doctor may watch how you:  Walk back and forth.  Keep your balance while you stand or walk.  Move from sitting to standing or from a bed to a chair.  Button or unbutton a shirt or sweater.  Remove and put on your shoes.  It's common to feel a little worried or anxious if you find you can't do all the things you used to be able to do. Talking with your doctor about ADLs is a way to make sure you're as safe as possible and able to care for yourself as well as you can. You may want to bring a caregiver, friend, or family member to your checkup. They can help you talk to your doctor.  Follow-up care is a key part of your treatment and safety. Be sure to make and go to all appointments, and call your doctor if you are having problems. It's also a good idea to know your test results and keep a list of the medicines you take.  Current as of: October 24, 2024  Content Version: 14.5    7721-1985 [employer].   Care instructions adapted under license by your healthcare professional. If you have questions about a medical condition or this instruction, always ask your healthcare professional. [employer] disclaims any warranty or liability for your use of this information.    Hearing  Loss: Care Instructions  Overview     Hearing loss is a sudden or slow decrease in how well you hear. It can range from slight to profound. Permanent hearing loss can occur with aging. It also can happen when you are exposed long-term to loud noise. Examples include listening to loud music, riding motorcycles, or being around other loud machines.  Hearing loss can affect your work and home life. It can make you feel lonely or depressed. You may feel that you have lost your independence. But hearing aids and other devices can help you hear better and feel connected to others.  Follow-up care is a key part of your treatment and safety. Be sure to make and go to all appointments, and call your doctor if you are having problems. It's also a good idea to know your test results and keep a list of the medicines you take.  How can you care for yourself at home?  Avoid loud noises whenever possible. This helps keep your hearing from getting worse.  Always wear hearing protection around loud noises.  Wear a hearing aid as directed.  A professional can help you pick a hearing aid that will work best for you.  You can also get hearing aids over the counter for mild to moderate hearing loss.  Have hearing tests as your doctor suggests. They can show whether your hearing has changed. Your hearing aid may need to be adjusted.  Use other devices as needed. These may include:  Telephone amplifiers and hearing aids that can connect to a television, stereo, radio, or microphone.  Devices that use lights or vibrations. These alert you to the doorbell, a ringing telephone, or a baby monitor.  Television closed-captioning. This shows the words at the bottom of the screen. Most new TVs can do this.  TTY (text telephone). This lets you type messages back and forth on the telephone instead of talking or listening. These devices are also called TDD. When messages are typed on the keyboard, they are sent over the phone line to a receiving  "TTY. The message is shown on a monitor.  Use text messaging, social media, and email if it is hard for you to communicate by telephone.  Try to learn a listening technique called speechreading. It is not lipreading. You pay attention to people's gestures, expressions, posture, and tone of voice. These clues can help you understand what a person is saying. Face the person you are talking to, and have them face you. Make sure the lighting is good. You need to see the other person's face clearly.  Think about counseling if you need help to adjust to your hearing loss.  When should you call for help?  Watch closely for changes in your health, and be sure to contact your doctor if:    You think your hearing is getting worse.     You have new symptoms, such as dizziness or nausea.   Where can you learn more?  Go to https://www.[employer].net/patiented  Enter R798 in the search box to learn more about \"Hearing Loss: Care Instructions.\"  Current as of: October 27, 2024  Content Version: 14.5    1432-0469 [employer].   Care instructions adapted under license by your healthcare professional. If you have questions about a medical condition or this instruction, always ask your healthcare professional. [employer] disclaims any warranty or liability for your use of this information.       " no

## 2025-07-02 NOTE — TELEPHONE ENCOUNTER
Called pt's [facility]isted [facility] and relayed to RN Marie Titration instructions for Memantine 5 mg Rx has been updated.   Marie stated administration instructions provided by pharmacy with medication box will suffice as an order for their MAR.   RN will call back if additional information needed.   No further actions needed from PCP's office at this time.     Janel LACEY RN

## 2025-07-02 NOTE — TELEPHONE ENCOUNTER
Pt's group home calling requesting that the instructions be updated. Please see below. Thanks    Dulce Bridges RN  [facility]

## 2025-07-07 DIAGNOSIS — E03.9 HYPOTHYROIDISM, UNSPECIFIED TYPE: ICD-10-CM

## 2025-07-07 RX ORDER — LEVOTHYROXINE SODIUM 25 UG/1
25 TABLET ORAL DAILY
Qty: 30 TABLET | Refills: 11 | OUTPATIENT
Start: 2025-07-07

## 2025-07-09 DIAGNOSIS — E03.9 HYPOTHYROIDISM, UNSPECIFIED TYPE: ICD-10-CM

## 2025-07-09 RX ORDER — LEVOTHYROXINE SODIUM 25 UG/1
25 TABLET ORAL DAILY
Qty: 30 TABLET | Refills: 11 | Status: SHIPPED | OUTPATIENT
Start: 2025-07-09

## 2025-07-14 ENCOUNTER — TELEPHONE (OUTPATIENT)
Dept: FAMILY MEDICINE | Facility: CLINIC | Age: OVER 89
End: 2025-07-14
Payer: MEDICARE

## 2025-07-14 ENCOUNTER — MYC MEDICAL ADVICE (OUTPATIENT)
Dept: FAMILY MEDICINE | Facility: CLINIC | Age: OVER 89
End: 2025-07-14
Payer: MEDICARE

## 2025-07-14 NOTE — TELEPHONE ENCOUNTER
Called  daughter and scheduled Keysha tomorrow 7/15/25 at 2:30 for VV with Dr Wegener Cathy Waldrop  Care Unit Coordinator

## 2025-07-14 NOTE — TELEPHONE ENCOUNTER
Reason for Call:  Appointment Request    Patient requesting this type of appt:  left leg, looks like an infection again     Requested provider: Wegener, Joel Daniel Irwin    Reason patient unable to be scheduled: Not within requested timeframe    When does patient want to be seen/preferred time: 1-2 days    Comments: please contact patient and daughter     [facility] or would you prefer to receive a phone call?:   Patient would prefer a phone call   Okay to leave a detailed message?: Yes at Other phone number:  [phone]     Call taken on 7/14/2025 at 4:12 PM by Sobeida Colmenares

## 2025-07-15 ENCOUNTER — VIRTUAL VISIT (OUTPATIENT)
Dept: FAMILY MEDICINE | Facility: CLINIC | Age: OVER 89
End: 2025-07-15
Payer: MEDICARE

## 2025-07-15 DIAGNOSIS — S90.02XA CONTUSION OF LEFT ANKLE, INITIAL ENCOUNTER: Primary | ICD-10-CM

## 2025-07-15 PROCEDURE — 1126F AMNT PAIN NOTED NONE PRSNT: CPT | Mod: 95 | Performed by: FAMILY MEDICINE

## 2025-07-15 PROCEDURE — 98006 SYNCH AUDIO-VIDEO EST MOD 30: CPT | Performed by: FAMILY MEDICINE

## 2025-07-15 NOTE — PROGRESS NOTES
Keysha is a 89 year old who is being evaluated via a billable video visit.    How would you like to obtain your AVS? MyChart  If the video visit is dropped, the invitation should be resent by: Text to cell phone: [phone]  Will anyone else be joining your video visit? No      Assessment & Plan     Contusion of left ankle, initial encounter  Visit with Keysha and daughter via video.  Daughter saw bruise left shin today and concerned could be cellulitis. Keysha feels slipped near bed and hit shin on bottom of bed although not fully sure and does not always remember everything due to dementia. Able to walk normally but it is tender to touch and has 2+ swelling compared to right.     Video exam reveals two approximately 3x2 cm area of deep purpule (but not red) skin consistent with echymosis/bruise. No breaks in skin seen or reported.     Elected not do do xray now since walking well.     Recommend:  Cool packs 3-4 times/day for 15 minutes  Ace wrap for compression for swelling and comfort.   Outline affected area with marker.  If developing significant red/warm skin (erythema) around area vs purple bruising may need antibiotics for cellulitis in the future.         Subjective   Keysha is a 89 year old, presenting for the following health issues:  Lesion (LLE)        7/15/2025    11:37 AM   Additional Questions   Roomed by HALLEY Nickerson   Accompanied by Yenny - daughter       Video Start Time: 2:42    HPI      Skin Lesion  Onset/Duration: yesterday  Description  Location: front of left lower leg  Color: purple, pink   Border description: irregular border, swollen  Itching: no  Bleeding:  No  Accompanying signs and symptoms:   Bleeding: No  Scaling: YES  History:  Previous episodes of similar lesion: YES - previous puncture wound injury 4/2025, same location, states was cellulitis   Therapies tried and outcome: none, icing            Objective    Vitals - Patient Reported  Systolic (Patient Reported): 126  Diastolic  (Patient Reported): 77  Pulse (Patient Reported): 77  Pain Score: No Pain (0)      Vitals:  No vitals were obtained today due to virtual visit.    Physical Exam   GENERAL: alert and no distress  EYES: Eyes grossly normal to inspection.  No discharge or erythema, or obvious scleral/conjunctival abnormalities.  RESP: No audible wheeze, cough, or visible cyanosis.    SKIN: Visible skin clear. No significant rash, abnormal pigmentation or lesions.  NEURO: Cranial nerves grossly intact.  Mentation and speech appropriate for age.  PSYCH: Appropriate affect, tone, and pace of words    As above.       Video-Visit Details    Type of service:  Video Visit   Video End Time:2:54 PM  Originating Location (pt. Location): Home    Distant Location (provider location):  On-site  Platform used for Video Visit: Wagner  Signed Electronically by: Joel Daniel Wegener, MD